# Patient Record
Sex: MALE | Race: WHITE | NOT HISPANIC OR LATINO
[De-identification: names, ages, dates, MRNs, and addresses within clinical notes are randomized per-mention and may not be internally consistent; named-entity substitution may affect disease eponyms.]

---

## 2017-03-23 ENCOUNTER — APPOINTMENT (OUTPATIENT)
Dept: HEMATOLOGY ONCOLOGY | Facility: CLINIC | Age: 67
End: 2017-03-23

## 2017-03-23 ENCOUNTER — OUTPATIENT (OUTPATIENT)
Dept: OUTPATIENT SERVICES | Facility: HOSPITAL | Age: 67
LOS: 1 days | Discharge: HOME | End: 2017-03-23

## 2017-03-23 VITALS
DIASTOLIC BLOOD PRESSURE: 91 MMHG | SYSTOLIC BLOOD PRESSURE: 185 MMHG | TEMPERATURE: 96.2 F | RESPIRATION RATE: 12 BRPM | HEART RATE: 75 BPM | WEIGHT: 242 LBS

## 2017-03-23 DIAGNOSIS — I48.91 UNSPECIFIED ATRIAL FIBRILLATION: ICD-10-CM

## 2017-03-23 DIAGNOSIS — N18.4 CHRONIC KIDNEY DISEASE, STAGE 4 (SEVERE): ICD-10-CM

## 2017-03-23 DIAGNOSIS — I10 ESSENTIAL (PRIMARY) HYPERTENSION: ICD-10-CM

## 2017-03-23 DIAGNOSIS — L27.0 GENERALIZED SKIN ERUPTION DUE TO DRUGS AND MEDICAMENTS TAKEN INTERNALLY: ICD-10-CM

## 2017-03-23 DIAGNOSIS — D64.9 ANEMIA, UNSPECIFIED: ICD-10-CM

## 2017-03-23 LAB
BASOPHILS # BLD: 0.05 TH/MM3
BASOPHILS NFR BLD: 0.7 %
EOSINOPHIL # BLD: 0.5 TH/MM3
EOSINOPHIL NFR BLD: 7.2 %
ERYTHROCYTE [DISTWIDTH] IN BLOOD BY AUTOMATED COUNT: 12.6 %
GRANULOCYTES # BLD: 3.92 TH/MM3
GRANULOCYTES NFR BLD: 56.5 %
HCT VFR BLD AUTO: 26.7 %
HGB BLD-MCNC: 9.2 G/DL
IMM GRANULOCYTES # BLD: 0.04 TH/MM3
IMM GRANULOCYTES NFR BLD: 0.6 %
LYMPHOCYTES # BLD: 1.87 TH/MM3
LYMPHOCYTES NFR BLD: 26.9 %
MCH RBC QN AUTO: 30.5 PG
MCHC RBC AUTO-ENTMCNC: 34.5 G/DL
MCV RBC AUTO: 88.4 FL
MONOCYTES # BLD: 0.56 TH/MM3
MONOCYTES NFR BLD: 8.1 %
PLATELET # BLD: 151 TH/MM3
PMV BLD AUTO: 10.1 FL
RBC # BLD AUTO: 3.02 MIL/MM3
WBC # BLD: 6.94 TH/MM3

## 2017-03-23 RX ORDER — INSULIN GLARGINE 100 [IU]/ML
100 INJECTION, SOLUTION SUBCUTANEOUS
Qty: 30 | Refills: 0 | Status: COMPLETED | COMMUNITY
Start: 2017-02-15

## 2017-05-31 ENCOUNTER — OUTPATIENT (OUTPATIENT)
Dept: OUTPATIENT SERVICES | Facility: HOSPITAL | Age: 67
LOS: 1 days | Discharge: HOME | End: 2017-05-31

## 2017-06-22 ENCOUNTER — OUTPATIENT (OUTPATIENT)
Dept: OUTPATIENT SERVICES | Facility: HOSPITAL | Age: 67
LOS: 1 days | Discharge: HOME | End: 2017-06-22

## 2017-06-22 DIAGNOSIS — L27.0 GENERALIZED SKIN ERUPTION DUE TO DRUGS AND MEDICAMENTS TAKEN INTERNALLY: ICD-10-CM

## 2017-06-22 DIAGNOSIS — N18.4 CHRONIC KIDNEY DISEASE, STAGE 4 (SEVERE): ICD-10-CM

## 2017-06-22 DIAGNOSIS — I48.91 UNSPECIFIED ATRIAL FIBRILLATION: ICD-10-CM

## 2017-06-22 DIAGNOSIS — I10 ESSENTIAL (PRIMARY) HYPERTENSION: ICD-10-CM

## 2017-06-26 ENCOUNTER — TRANSCRIPTION ENCOUNTER (OUTPATIENT)
Age: 67
End: 2017-06-26

## 2017-06-28 DIAGNOSIS — N18.3 CHRONIC KIDNEY DISEASE, STAGE 3 (MODERATE): ICD-10-CM

## 2017-06-28 DIAGNOSIS — H25.11 AGE-RELATED NUCLEAR CATARACT, RIGHT EYE: ICD-10-CM

## 2017-06-28 DIAGNOSIS — G47.33 OBSTRUCTIVE SLEEP APNEA (ADULT) (PEDIATRIC): ICD-10-CM

## 2017-06-28 DIAGNOSIS — H57.04 MYDRIASIS: ICD-10-CM

## 2017-06-28 DIAGNOSIS — I10 ESSENTIAL (PRIMARY) HYPERTENSION: ICD-10-CM

## 2017-06-28 DIAGNOSIS — E11.9 TYPE 2 DIABETES MELLITUS WITHOUT COMPLICATIONS: ICD-10-CM

## 2017-09-21 ENCOUNTER — APPOINTMENT (OUTPATIENT)
Dept: HEMATOLOGY ONCOLOGY | Facility: CLINIC | Age: 67
End: 2017-09-21

## 2017-09-21 VITALS
HEART RATE: 77 BPM | SYSTOLIC BLOOD PRESSURE: 179 MMHG | DIASTOLIC BLOOD PRESSURE: 74 MMHG | TEMPERATURE: 98.6 F | HEIGHT: 69 IN | BODY MASS INDEX: 35.84 KG/M2 | RESPIRATION RATE: 14 BRPM | WEIGHT: 242 LBS

## 2017-09-27 LAB
BASOPHILS # BLD: 0.09 TH/MM3
BASOPHILS NFR BLD: 1.3 %
EOSINOPHIL # BLD: 0.63 TH/MM3
EOSINOPHIL NFR BLD: 9.3 %
ERYTHROCYTE [DISTWIDTH] IN BLOOD BY AUTOMATED COUNT: 14.8 %
GRANULOCYTES # BLD: 4.11 TH/MM3
GRANULOCYTES NFR BLD: 60.4 %
HCT VFR BLD AUTO: 22 %
HGB BLD-MCNC: 6.9 G/DL
IMM GRANULOCYTES # BLD: 0.03 TH/MM3
IMM GRANULOCYTES NFR BLD: 0.4 %
LYMPHOCYTES # BLD: 1.5 TH/MM3
LYMPHOCYTES NFR BLD: 22 %
MCH RBC QN AUTO: 28.4 PG
MCHC RBC AUTO-ENTMCNC: 31.4 G/DL
MCV RBC AUTO: 90.5 FL
MONOCYTES # BLD: 0.45 TH/MM3
MONOCYTES NFR BLD: 6.6 %
PLATELET # BLD: 166 TH/MM3
PMV BLD AUTO: 9.5 FL
RBC # BLD AUTO: 2.43 MIL/MM3
WBC # BLD: 6.81 TH/MM3

## 2017-09-28 ENCOUNTER — APPOINTMENT (OUTPATIENT)
Dept: HEMATOLOGY ONCOLOGY | Facility: CLINIC | Age: 67
End: 2017-09-28

## 2017-09-28 LAB
BASOPHILS # BLD: 0.07 TH/MM3
BASOPHILS NFR BLD: 1 %
EOSINOPHIL # BLD: 0.53 TH/MM3
EOSINOPHIL NFR BLD: 7.9 %
ERYTHROCYTE [DISTWIDTH] IN BLOOD BY AUTOMATED COUNT: 14.4 %
GRANULOCYTES # BLD: 3.99 TH/MM3
GRANULOCYTES NFR BLD: 59.8 %
HCT VFR BLD AUTO: 22.3 %
HGB BLD-MCNC: 7.3 G/DL
IMM GRANULOCYTES # BLD: 0.05 TH/MM3
IMM GRANULOCYTES NFR BLD: 0.7 %
LYMPHOCYTES # BLD: 1.6 TH/MM3
LYMPHOCYTES NFR BLD: 23.9 %
MCH RBC QN AUTO: 29.1 PG
MCHC RBC AUTO-ENTMCNC: 32.7 G/DL
MCV RBC AUTO: 88.8 FL
MONOCYTES # BLD: 0.45 TH/MM3
MONOCYTES NFR BLD: 6.7 %
PLATELET # BLD: 173 TH/MM3
PMV BLD AUTO: 9.5 FL
RBC # BLD AUTO: 2.51 MIL/MM3
WBC # BLD: 6.69 TH/MM3

## 2017-10-05 ENCOUNTER — APPOINTMENT (OUTPATIENT)
Dept: HEMATOLOGY ONCOLOGY | Facility: CLINIC | Age: 67
End: 2017-10-05

## 2017-11-16 ENCOUNTER — OUTPATIENT (OUTPATIENT)
Dept: OUTPATIENT SERVICES | Facility: HOSPITAL | Age: 67
LOS: 1 days | Discharge: HOME | End: 2017-11-16

## 2017-11-16 ENCOUNTER — APPOINTMENT (OUTPATIENT)
Dept: HEMATOLOGY ONCOLOGY | Facility: CLINIC | Age: 67
End: 2017-11-16

## 2017-11-16 DIAGNOSIS — N18.2 CHRONIC KIDNEY DISEASE, STAGE 2 (MILD): ICD-10-CM

## 2017-11-16 DIAGNOSIS — D64.9 ANEMIA, UNSPECIFIED: ICD-10-CM

## 2017-11-16 LAB
BASOPHILS # BLD: 0.07 TH/MM3
BASOPHILS NFR BLD: 1 %
EOSINOPHIL # BLD: 0.71 TH/MM3
EOSINOPHIL NFR BLD: 10.2 %
ERYTHROCYTE [DISTWIDTH] IN BLOOD BY AUTOMATED COUNT: 14.6 %
GRANULOCYTES # BLD: 4.36 TH/MM3
GRANULOCYTES NFR BLD: 62.8 %
HCT VFR BLD AUTO: 21.8 %
HGB BLD-MCNC: 7.1 G/DL
IMM GRANULOCYTES # BLD: 0.03 TH/MM3
IMM GRANULOCYTES NFR BLD: 0.4 %
LYMPHOCYTES # BLD: 1.34 TH/MM3
LYMPHOCYTES NFR BLD: 19.3 %
MCH RBC QN AUTO: 28.9 PG
MCHC RBC AUTO-ENTMCNC: 32.6 G/DL
MCV RBC AUTO: 88.6 FL
MONOCYTES # BLD: 0.44 TH/MM3
MONOCYTES NFR BLD: 6.3 %
PLATELET # BLD: 173 TH/MM3
PMV BLD AUTO: 9.8 FL
RBC # BLD AUTO: 2.46 MIL/MM3
WBC # BLD: 6.95 TH/MM3

## 2017-12-22 ENCOUNTER — APPOINTMENT (OUTPATIENT)
Dept: HEMATOLOGY ONCOLOGY | Facility: CLINIC | Age: 67
End: 2017-12-22

## 2017-12-22 VITALS
BODY MASS INDEX: 36.58 KG/M2 | WEIGHT: 247 LBS | RESPIRATION RATE: 14 BRPM | HEART RATE: 83 BPM | DIASTOLIC BLOOD PRESSURE: 73 MMHG | HEIGHT: 69 IN | TEMPERATURE: 98.2 F | SYSTOLIC BLOOD PRESSURE: 166 MMHG

## 2017-12-22 LAB
BASOPHILS # BLD: 0.05 TH/MM3
BASOPHILS NFR BLD: 0.6 %
EOSINOPHIL # BLD: 0.62 TH/MM3
EOSINOPHIL NFR BLD: 7.9 %
ERYTHROCYTE [DISTWIDTH] IN BLOOD BY AUTOMATED COUNT: 14.8 %
GRANULOCYTES # BLD: 5.18 TH/MM3
GRANULOCYTES NFR BLD: 66 %
HCT VFR BLD AUTO: 22.9 %
HGB BLD-MCNC: 7.1 G/DL
IMM GRANULOCYTES # BLD: 0.03 TH/MM3
IMM GRANULOCYTES NFR BLD: 0.4 %
LYMPHOCYTES # BLD: 1.37 TH/MM3
LYMPHOCYTES NFR BLD: 17.5 %
MCH RBC QN AUTO: 27.8 PG
MCHC RBC AUTO-ENTMCNC: 31 G/DL
MCV RBC AUTO: 89.8 FL
MONOCYTES # BLD: 0.6 TH/MM3
MONOCYTES NFR BLD: 7.6 %
PLATELET # BLD: 196 TH/MM3
PMV BLD AUTO: 9.6 FL
RBC # BLD AUTO: 2.55 MIL/MM3
WBC # BLD: 7.85 TH/MM3

## 2018-01-25 ENCOUNTER — APPOINTMENT (OUTPATIENT)
Dept: HEMATOLOGY ONCOLOGY | Facility: CLINIC | Age: 68
End: 2018-01-25

## 2018-01-25 ENCOUNTER — APPOINTMENT (OUTPATIENT)
Dept: INFUSION THERAPY | Facility: CLINIC | Age: 68
End: 2018-01-25

## 2018-01-25 LAB
BASOPHILS # BLD: 0.04 TH/MM3
BASOPHILS NFR BLD: 0.5 %
EOSINOPHIL # BLD: 0.68 TH/MM3
EOSINOPHIL NFR BLD: 8.7 %
ERYTHROCYTE [DISTWIDTH] IN BLOOD BY AUTOMATED COUNT: 14.9 %
GRANULOCYTES # BLD: 5.23 TH/MM3
GRANULOCYTES NFR BLD: 67.3 %
HCT VFR BLD AUTO: 22.6 %
HGB BLD-MCNC: 7 G/DL
IMM GRANULOCYTES # BLD: 0.04 TH/MM3
IMM GRANULOCYTES NFR BLD: 0.5 %
LYMPHOCYTES # BLD: 1.34 TH/MM3
LYMPHOCYTES NFR BLD: 17.2 %
MCH RBC QN AUTO: 28 PG
MCHC RBC AUTO-ENTMCNC: 31 G/DL
MCV RBC AUTO: 90.4 FL
MONOCYTES # BLD: 0.45 TH/MM3
MONOCYTES NFR BLD: 5.8 %
PLATELET # BLD: 188 TH/MM3
PMV BLD AUTO: 9.6 FL
RBC # BLD AUTO: 2.5 MIL/MM3
WBC # BLD: 7.78 TH/MM3

## 2018-01-26 ENCOUNTER — APPOINTMENT (OUTPATIENT)
Dept: INFUSION THERAPY | Facility: CLINIC | Age: 68
End: 2018-01-26

## 2018-01-30 DIAGNOSIS — N18.4 CHRONIC KIDNEY DISEASE, STAGE 4 (SEVERE): ICD-10-CM

## 2018-01-30 DIAGNOSIS — I10 ESSENTIAL (PRIMARY) HYPERTENSION: ICD-10-CM

## 2018-01-30 DIAGNOSIS — L27.0 GENERALIZED SKIN ERUPTION DUE TO DRUGS AND MEDICAMENTS TAKEN INTERNALLY: ICD-10-CM

## 2018-01-30 DIAGNOSIS — I48.91 UNSPECIFIED ATRIAL FIBRILLATION: ICD-10-CM

## 2018-02-01 ENCOUNTER — APPOINTMENT (OUTPATIENT)
Dept: INFUSION THERAPY | Facility: CLINIC | Age: 68
End: 2018-02-01

## 2018-02-08 ENCOUNTER — APPOINTMENT (OUTPATIENT)
Dept: INFUSION THERAPY | Facility: CLINIC | Age: 68
End: 2018-02-08

## 2018-02-08 ENCOUNTER — LABORATORY RESULT (OUTPATIENT)
Age: 68
End: 2018-02-08

## 2018-02-08 LAB
HCT VFR BLD CALC: 22 %
HGB BLD-MCNC: 6.9 G/DL
MCHC RBC-ENTMCNC: 27.4 PG
MCHC RBC-ENTMCNC: 31.4 G/DL
MCV RBC AUTO: 87.3 FL
PLATELET # BLD AUTO: 189 K/UL
PMV BLD: 9.7 FL
RBC # BLD: 2.52 M/UL
RBC # FLD: 15.6 %
WBC # FLD AUTO: 6.88 K/UL

## 2018-02-08 RX ORDER — ERYTHROPOIETIN 10000 [IU]/ML
20000 INJECTION, SOLUTION INTRAVENOUS; SUBCUTANEOUS ONCE
Qty: 0 | Refills: 0 | Status: COMPLETED | OUTPATIENT
Start: 2018-02-08 | End: 2018-02-08

## 2018-02-08 RX ADMIN — ERYTHROPOIETIN 20000 UNIT(S): 10000 INJECTION, SOLUTION INTRAVENOUS; SUBCUTANEOUS at 10:17

## 2018-02-16 ENCOUNTER — APPOINTMENT (OUTPATIENT)
Dept: INFUSION THERAPY | Facility: CLINIC | Age: 68
End: 2018-02-16

## 2018-02-16 RX ORDER — ERYTHROPOIETIN 10000 [IU]/ML
20000 INJECTION, SOLUTION INTRAVENOUS; SUBCUTANEOUS ONCE
Qty: 0 | Refills: 0 | Status: COMPLETED | OUTPATIENT
Start: 2018-02-16 | End: 2018-02-16

## 2018-02-16 RX ADMIN — ERYTHROPOIETIN 20000 UNIT(S): 10000 INJECTION, SOLUTION INTRAVENOUS; SUBCUTANEOUS at 10:48

## 2018-02-23 ENCOUNTER — LABORATORY RESULT (OUTPATIENT)
Age: 68
End: 2018-02-23

## 2018-02-23 ENCOUNTER — APPOINTMENT (OUTPATIENT)
Dept: INFUSION THERAPY | Facility: CLINIC | Age: 68
End: 2018-02-23

## 2018-02-23 LAB
HCT VFR BLD CALC: 24.5 %
HGB BLD-MCNC: 7.8 G/DL
MCHC RBC-ENTMCNC: 27.1 PG
MCHC RBC-ENTMCNC: 31.8 G/DL
MCV RBC AUTO: 85.1 FL
PLATELET # BLD AUTO: 206 K/UL
PMV BLD: 9.7 FL
RBC # BLD: 2.88 M/UL
RBC # FLD: 14.7 %
WBC # FLD AUTO: 9.97 K/UL

## 2018-02-23 RX ORDER — ERYTHROPOIETIN 10000 [IU]/ML
20000 INJECTION, SOLUTION INTRAVENOUS; SUBCUTANEOUS ONCE
Qty: 0 | Refills: 0 | Status: COMPLETED | OUTPATIENT
Start: 2018-02-23 | End: 2018-02-23

## 2018-02-23 RX ADMIN — ERYTHROPOIETIN 20000 UNIT(S): 10000 INJECTION, SOLUTION INTRAVENOUS; SUBCUTANEOUS at 10:38

## 2018-03-02 ENCOUNTER — APPOINTMENT (OUTPATIENT)
Dept: INFUSION THERAPY | Facility: CLINIC | Age: 68
End: 2018-03-02

## 2018-03-02 ENCOUNTER — LABORATORY RESULT (OUTPATIENT)
Age: 68
End: 2018-03-02

## 2018-03-02 LAB
HCT VFR BLD CALC: 24.3 %
HGB BLD-MCNC: 7.7 G/DL
MCHC RBC-ENTMCNC: 26.5 PG
MCHC RBC-ENTMCNC: 31.7 G/DL
MCV RBC AUTO: 83.5 FL
PLATELET # BLD AUTO: 262 K/UL
PMV BLD: 9.8 FL
RBC # BLD: 2.91 M/UL
RBC # FLD: 15.3 %
WBC # FLD AUTO: 8.31 K/UL

## 2018-03-02 RX ORDER — ERYTHROPOIETIN 10000 [IU]/ML
20000 INJECTION, SOLUTION INTRAVENOUS; SUBCUTANEOUS ONCE
Qty: 0 | Refills: 0 | Status: COMPLETED | OUTPATIENT
Start: 2018-03-02 | End: 2018-03-02

## 2018-03-02 RX ADMIN — ERYTHROPOIETIN 20000 UNIT(S): 10000 INJECTION, SOLUTION INTRAVENOUS; SUBCUTANEOUS at 10:20

## 2018-03-09 ENCOUNTER — APPOINTMENT (OUTPATIENT)
Dept: INFUSION THERAPY | Facility: CLINIC | Age: 68
End: 2018-03-09

## 2018-03-09 ENCOUNTER — LABORATORY RESULT (OUTPATIENT)
Age: 68
End: 2018-03-09

## 2018-03-09 LAB
HCT VFR BLD CALC: 26.1 %
HGB BLD-MCNC: 8.2 G/DL
MCHC RBC-ENTMCNC: 26.4 PG
MCHC RBC-ENTMCNC: 31.4 G/DL
MCV RBC AUTO: 83.9 FL
PLATELET # BLD AUTO: 266 K/UL
PMV BLD: 9.6 FL
RBC # BLD: 3.11 M/UL
RBC # FLD: 15.6 %
WBC # FLD AUTO: 7.74 K/UL

## 2018-03-09 RX ORDER — ERYTHROPOIETIN 10000 [IU]/ML
20000 INJECTION, SOLUTION INTRAVENOUS; SUBCUTANEOUS ONCE
Qty: 0 | Refills: 0 | Status: COMPLETED | OUTPATIENT
Start: 2018-03-09 | End: 2018-03-09

## 2018-03-09 RX ADMIN — ERYTHROPOIETIN 20000 UNIT(S): 10000 INJECTION, SOLUTION INTRAVENOUS; SUBCUTANEOUS at 10:44

## 2018-03-16 ENCOUNTER — APPOINTMENT (OUTPATIENT)
Dept: INFUSION THERAPY | Facility: CLINIC | Age: 68
End: 2018-03-16

## 2018-03-16 ENCOUNTER — LABORATORY RESULT (OUTPATIENT)
Age: 68
End: 2018-03-16

## 2018-03-16 LAB
HCT VFR BLD CALC: 26.3 %
HGB BLD-MCNC: 8.5 G/DL
MCHC RBC-ENTMCNC: 26.5 PG
MCHC RBC-ENTMCNC: 32.3 G/DL
MCV RBC AUTO: 81.9 FL
PLATELET # BLD AUTO: 209 K/UL
PMV BLD: 9.6 FL
RBC # BLD: 3.21 M/UL
RBC # FLD: 15.6 %
WBC # FLD AUTO: 9.69 K/UL

## 2018-03-16 RX ORDER — ERYTHROPOIETIN 10000 [IU]/ML
20000 INJECTION, SOLUTION INTRAVENOUS; SUBCUTANEOUS ONCE
Qty: 0 | Refills: 0 | Status: COMPLETED | OUTPATIENT
Start: 2018-03-16 | End: 2018-03-16

## 2018-03-16 RX ADMIN — ERYTHROPOIETIN 20000 UNIT(S): 10000 INJECTION, SOLUTION INTRAVENOUS; SUBCUTANEOUS at 10:58

## 2018-03-23 ENCOUNTER — APPOINTMENT (OUTPATIENT)
Dept: INFUSION THERAPY | Facility: CLINIC | Age: 68
End: 2018-03-23

## 2018-03-23 ENCOUNTER — LABORATORY RESULT (OUTPATIENT)
Age: 68
End: 2018-03-23

## 2018-03-23 LAB
HCT VFR BLD CALC: 27.2 %
HGB BLD-MCNC: 8.6 G/DL
MCHC RBC-ENTMCNC: 26.2 PG
MCHC RBC-ENTMCNC: 31.6 G/DL
MCV RBC AUTO: 82.9 FL
PLATELET # BLD AUTO: 206 K/UL
PMV BLD: 9.9 FL
RBC # BLD: 3.28 M/UL
RBC # FLD: 15.5 %
WBC # FLD AUTO: 6.82 K/UL

## 2018-03-23 RX ORDER — ERYTHROPOIETIN 10000 [IU]/ML
20000 INJECTION, SOLUTION INTRAVENOUS; SUBCUTANEOUS ONCE
Qty: 0 | Refills: 0 | Status: COMPLETED | OUTPATIENT
Start: 2018-03-23 | End: 2018-03-23

## 2018-03-23 RX ADMIN — ERYTHROPOIETIN 20000 UNIT(S): 10000 INJECTION, SOLUTION INTRAVENOUS; SUBCUTANEOUS at 10:22

## 2018-03-30 ENCOUNTER — APPOINTMENT (OUTPATIENT)
Dept: INFUSION THERAPY | Facility: CLINIC | Age: 68
End: 2018-03-30

## 2018-03-30 ENCOUNTER — LABORATORY RESULT (OUTPATIENT)
Age: 68
End: 2018-03-30

## 2018-03-30 LAB
HCT VFR BLD CALC: 28.7 %
HGB BLD-MCNC: 9 G/DL
MCHC RBC-ENTMCNC: 26.2 PG
MCHC RBC-ENTMCNC: 31.4 G/DL
MCV RBC AUTO: 83.7 FL
PLATELET # BLD AUTO: 191 K/UL
PMV BLD: 10.4 FL
RBC # BLD: 3.43 M/UL
RBC # FLD: 16.4 %
WBC # FLD AUTO: 8.08 K/UL

## 2018-03-30 RX ORDER — ERYTHROPOIETIN 10000 [IU]/ML
20000 INJECTION, SOLUTION INTRAVENOUS; SUBCUTANEOUS ONCE
Qty: 0 | Refills: 0 | Status: COMPLETED | OUTPATIENT
Start: 2018-03-30 | End: 2018-03-30

## 2018-03-30 RX ADMIN — ERYTHROPOIETIN 20000 UNIT(S): 10000 INJECTION, SOLUTION INTRAVENOUS; SUBCUTANEOUS at 10:46

## 2018-04-06 ENCOUNTER — LABORATORY RESULT (OUTPATIENT)
Age: 68
End: 2018-04-06

## 2018-04-06 ENCOUNTER — APPOINTMENT (OUTPATIENT)
Dept: INFUSION THERAPY | Facility: CLINIC | Age: 68
End: 2018-04-06

## 2018-04-06 LAB
HCT VFR BLD CALC: 27.9 %
HGB BLD-MCNC: 8.8 G/DL
MCHC RBC-ENTMCNC: 26.6 PG
MCHC RBC-ENTMCNC: 31.5 G/DL
MCV RBC AUTO: 84.3 FL
PLATELET # BLD AUTO: 185 K/UL
PMV BLD: 9.8 FL
RBC # BLD: 3.31 M/UL
RBC # FLD: 17 %
WBC # FLD AUTO: 8.81 K/UL

## 2018-04-06 RX ORDER — ERYTHROPOIETIN 10000 [IU]/ML
20000 INJECTION, SOLUTION INTRAVENOUS; SUBCUTANEOUS ONCE
Qty: 0 | Refills: 0 | Status: COMPLETED | OUTPATIENT
Start: 2018-04-06 | End: 2018-04-06

## 2018-04-06 RX ADMIN — ERYTHROPOIETIN 20000 UNIT(S): 10000 INJECTION, SOLUTION INTRAVENOUS; SUBCUTANEOUS at 11:34

## 2018-04-13 ENCOUNTER — LABORATORY RESULT (OUTPATIENT)
Age: 68
End: 2018-04-13

## 2018-04-13 ENCOUNTER — APPOINTMENT (OUTPATIENT)
Dept: INFUSION THERAPY | Facility: CLINIC | Age: 68
End: 2018-04-13

## 2018-04-13 LAB
HCT VFR BLD CALC: 28.9 %
HGB BLD-MCNC: 9 G/DL
MCHC RBC-ENTMCNC: 26.3 PG
MCHC RBC-ENTMCNC: 31.1 G/DL
MCV RBC AUTO: 84.5 FL
PLATELET # BLD AUTO: 211 K/UL
PMV BLD: 10.3 FL
RBC # BLD: 3.42 M/UL
RBC # FLD: 17 %
WBC # FLD AUTO: 12.54 K/UL

## 2018-04-13 RX ORDER — ERYTHROPOIETIN 10000 [IU]/ML
20000 INJECTION, SOLUTION INTRAVENOUS; SUBCUTANEOUS ONCE
Qty: 0 | Refills: 0 | Status: COMPLETED | OUTPATIENT
Start: 2018-04-13 | End: 2018-04-13

## 2018-04-13 RX ADMIN — ERYTHROPOIETIN 20000 UNIT(S): 10000 INJECTION, SOLUTION INTRAVENOUS; SUBCUTANEOUS at 10:21

## 2018-04-20 ENCOUNTER — APPOINTMENT (OUTPATIENT)
Dept: INFUSION THERAPY | Facility: CLINIC | Age: 68
End: 2018-04-20

## 2018-04-20 ENCOUNTER — LABORATORY RESULT (OUTPATIENT)
Age: 68
End: 2018-04-20

## 2018-04-20 LAB
HCT VFR BLD CALC: 28.1 %
HGB BLD-MCNC: 8.9 G/DL
MCHC RBC-ENTMCNC: 26.3 PG
MCHC RBC-ENTMCNC: 31.7 G/DL
MCV RBC AUTO: 83.1 FL
PLATELET # BLD AUTO: 191 K/UL
PMV BLD: 10.1 FL
RBC # BLD: 3.38 M/UL
RBC # FLD: 16.6 %
WBC # FLD AUTO: 9.28 K/UL

## 2018-04-20 RX ORDER — ERYTHROPOIETIN 10000 [IU]/ML
20000 INJECTION, SOLUTION INTRAVENOUS; SUBCUTANEOUS ONCE
Qty: 0 | Refills: 0 | Status: COMPLETED | OUTPATIENT
Start: 2018-04-20 | End: 2018-04-20

## 2018-04-20 RX ADMIN — ERYTHROPOIETIN 20000 UNIT(S): 10000 INJECTION, SOLUTION INTRAVENOUS; SUBCUTANEOUS at 10:48

## 2018-04-27 ENCOUNTER — APPOINTMENT (OUTPATIENT)
Dept: INFUSION THERAPY | Facility: CLINIC | Age: 68
End: 2018-04-27

## 2018-04-27 ENCOUNTER — LABORATORY RESULT (OUTPATIENT)
Age: 68
End: 2018-04-27

## 2018-04-27 LAB
HCT VFR BLD CALC: 29.4 %
HGB BLD-MCNC: 9 G/DL
MCHC RBC-ENTMCNC: 26 PG
MCHC RBC-ENTMCNC: 30.6 G/DL
MCV RBC AUTO: 85 FL
PLATELET # BLD AUTO: 224 K/UL
PMV BLD: 9.7 FL
RBC # BLD: 3.46 M/UL
RBC # FLD: 16.6 %
WBC # FLD AUTO: 7.04 K/UL

## 2018-04-27 RX ORDER — ERYTHROPOIETIN 10000 [IU]/ML
20000 INJECTION, SOLUTION INTRAVENOUS; SUBCUTANEOUS ONCE
Qty: 0 | Refills: 0 | Status: COMPLETED | OUTPATIENT
Start: 2018-04-27 | End: 2018-04-27

## 2018-04-27 RX ADMIN — ERYTHROPOIETIN 20000 UNIT(S): 10000 INJECTION, SOLUTION INTRAVENOUS; SUBCUTANEOUS at 10:51

## 2018-05-04 ENCOUNTER — LABORATORY RESULT (OUTPATIENT)
Age: 68
End: 2018-05-04

## 2018-05-04 ENCOUNTER — APPOINTMENT (OUTPATIENT)
Dept: INFUSION THERAPY | Facility: CLINIC | Age: 68
End: 2018-05-04

## 2018-05-04 RX ORDER — ERYTHROPOIETIN 10000 [IU]/ML
20000 INJECTION, SOLUTION INTRAVENOUS; SUBCUTANEOUS ONCE
Qty: 0 | Refills: 0 | Status: COMPLETED | OUTPATIENT
Start: 2018-05-04 | End: 2018-05-04

## 2018-05-04 RX ADMIN — ERYTHROPOIETIN 20000 UNIT(S): 10000 INJECTION, SOLUTION INTRAVENOUS; SUBCUTANEOUS at 10:59

## 2018-05-07 LAB
HCT VFR BLD CALC: 30.8 %
HGB BLD-MCNC: 9.7 G/DL
MCHC RBC-ENTMCNC: 26.5 PG
MCHC RBC-ENTMCNC: 31.5 G/DL
MCV RBC AUTO: 84.2 FL
PLATELET # BLD AUTO: 208 K/UL
PMV BLD: 9.7 FL
RBC # BLD: 3.66 M/UL
RBC # FLD: 17.2 %
WBC # FLD AUTO: 6.54 K/UL

## 2018-05-11 ENCOUNTER — APPOINTMENT (OUTPATIENT)
Dept: INFUSION THERAPY | Facility: CLINIC | Age: 68
End: 2018-05-11

## 2018-05-11 RX ORDER — ERYTHROPOIETIN 10000 [IU]/ML
20000 INJECTION, SOLUTION INTRAVENOUS; SUBCUTANEOUS ONCE
Qty: 0 | Refills: 0 | Status: COMPLETED | OUTPATIENT
Start: 2018-05-11 | End: 2018-05-11

## 2018-05-11 RX ADMIN — ERYTHROPOIETIN 20000 UNIT(S): 10000 INJECTION, SOLUTION INTRAVENOUS; SUBCUTANEOUS at 10:14

## 2018-05-18 ENCOUNTER — LABORATORY RESULT (OUTPATIENT)
Age: 68
End: 2018-05-18

## 2018-05-18 ENCOUNTER — APPOINTMENT (OUTPATIENT)
Dept: INFUSION THERAPY | Facility: CLINIC | Age: 68
End: 2018-05-18

## 2018-05-18 LAB
HCT VFR BLD CALC: 31.6 %
HGB BLD-MCNC: 9.8 G/DL
MCHC RBC-ENTMCNC: 26.3 PG
MCHC RBC-ENTMCNC: 31 G/DL
MCV RBC AUTO: 84.7 FL
PLATELET # BLD AUTO: 169 K/UL
PMV BLD: 9.5 FL
RBC # BLD: 3.73 M/UL
RBC # FLD: 17.4 %
WBC # FLD AUTO: 6.8 K/UL

## 2018-05-18 RX ORDER — ERYTHROPOIETIN 10000 [IU]/ML
20000 INJECTION, SOLUTION INTRAVENOUS; SUBCUTANEOUS ONCE
Qty: 0 | Refills: 0 | Status: COMPLETED | OUTPATIENT
Start: 2018-05-18 | End: 2018-05-18

## 2018-05-18 RX ADMIN — ERYTHROPOIETIN 20000 UNIT(S): 10000 INJECTION, SOLUTION INTRAVENOUS; SUBCUTANEOUS at 10:31

## 2018-05-25 ENCOUNTER — APPOINTMENT (OUTPATIENT)
Dept: INFUSION THERAPY | Facility: CLINIC | Age: 68
End: 2018-05-25

## 2018-05-25 RX ORDER — ERYTHROPOIETIN 10000 [IU]/ML
20000 INJECTION, SOLUTION INTRAVENOUS; SUBCUTANEOUS ONCE
Qty: 0 | Refills: 0 | Status: COMPLETED | OUTPATIENT
Start: 2018-05-25 | End: 2018-05-25

## 2018-05-25 RX ADMIN — ERYTHROPOIETIN 20000 UNIT(S): 10000 INJECTION, SOLUTION INTRAVENOUS; SUBCUTANEOUS at 09:29

## 2018-06-01 ENCOUNTER — APPOINTMENT (OUTPATIENT)
Dept: INFUSION THERAPY | Facility: CLINIC | Age: 68
End: 2018-06-01

## 2018-06-01 ENCOUNTER — LABORATORY RESULT (OUTPATIENT)
Age: 68
End: 2018-06-01

## 2018-06-01 VITALS
SYSTOLIC BLOOD PRESSURE: 156 MMHG | DIASTOLIC BLOOD PRESSURE: 69 MMHG | HEART RATE: 74 BPM | TEMPERATURE: 97.3 F | RESPIRATION RATE: 18 BRPM

## 2018-06-01 LAB
HCT VFR BLD CALC: 30.5 %
HGB BLD-MCNC: 9.5 G/DL
MCHC RBC-ENTMCNC: 26.5 PG
MCHC RBC-ENTMCNC: 31.1 G/DL
MCV RBC AUTO: 85 FL
PLATELET # BLD AUTO: 182 K/UL
PMV BLD: 9.5 FL
RBC # BLD: 3.59 M/UL
RBC # FLD: 17 %
WBC # FLD AUTO: 7.67 K/UL

## 2018-06-01 RX ORDER — ERYTHROPOIETIN 10000 [IU]/ML
20000 INJECTION, SOLUTION INTRAVENOUS; SUBCUTANEOUS ONCE
Qty: 0 | Refills: 0 | Status: COMPLETED | OUTPATIENT
Start: 2018-06-01 | End: 2018-06-01

## 2018-06-01 RX ADMIN — ERYTHROPOIETIN 20000 UNIT(S): 10000 INJECTION, SOLUTION INTRAVENOUS; SUBCUTANEOUS at 10:29

## 2018-06-08 ENCOUNTER — APPOINTMENT (OUTPATIENT)
Dept: INFUSION THERAPY | Facility: CLINIC | Age: 68
End: 2018-06-08

## 2018-06-08 RX ORDER — ERYTHROPOIETIN 10000 [IU]/ML
20000 INJECTION, SOLUTION INTRAVENOUS; SUBCUTANEOUS ONCE
Qty: 0 | Refills: 0 | Status: COMPLETED | OUTPATIENT
Start: 2018-06-08 | End: 2018-09-07

## 2018-06-15 ENCOUNTER — LABORATORY RESULT (OUTPATIENT)
Age: 68
End: 2018-06-15

## 2018-06-15 ENCOUNTER — APPOINTMENT (OUTPATIENT)
Dept: INFUSION THERAPY | Facility: CLINIC | Age: 68
End: 2018-06-15

## 2018-06-15 LAB
HCT VFR BLD CALC: 28.9 %
HGB BLD-MCNC: 9.1 G/DL
MCHC RBC-ENTMCNC: 26.2 PG
MCHC RBC-ENTMCNC: 31.5 G/DL
MCV RBC AUTO: 83.3 FL
PLATELET # BLD AUTO: 208 K/UL
PMV BLD: 9.9 FL
RBC # BLD: 3.47 M/UL
RBC # FLD: 15.6 %
WBC # FLD AUTO: 8.71 K/UL

## 2018-06-15 RX ORDER — ERYTHROPOIETIN 10000 [IU]/ML
20000 INJECTION, SOLUTION INTRAVENOUS; SUBCUTANEOUS ONCE
Qty: 0 | Refills: 0 | Status: COMPLETED | OUTPATIENT
Start: 2018-06-15 | End: 2018-06-15

## 2018-06-15 RX ADMIN — ERYTHROPOIETIN 20000 UNIT(S): 10000 INJECTION, SOLUTION INTRAVENOUS; SUBCUTANEOUS at 11:09

## 2018-06-22 ENCOUNTER — APPOINTMENT (OUTPATIENT)
Dept: INFUSION THERAPY | Facility: CLINIC | Age: 68
End: 2018-06-22

## 2018-06-22 ENCOUNTER — OUTPATIENT (OUTPATIENT)
Dept: OUTPATIENT SERVICES | Facility: HOSPITAL | Age: 68
LOS: 1 days | Discharge: HOME | End: 2018-06-22

## 2018-06-22 DIAGNOSIS — D64.9 ANEMIA, UNSPECIFIED: ICD-10-CM

## 2018-06-22 DIAGNOSIS — N28.9 DISORDER OF KIDNEY AND URETER, UNSPECIFIED: ICD-10-CM

## 2018-06-22 RX ORDER — ERYTHROPOIETIN 10000 [IU]/ML
20000 INJECTION, SOLUTION INTRAVENOUS; SUBCUTANEOUS ONCE
Qty: 0 | Refills: 0 | Status: COMPLETED | OUTPATIENT
Start: 2018-06-22 | End: 2018-06-22

## 2018-06-22 RX ADMIN — ERYTHROPOIETIN 20000 UNIT(S): 10000 INJECTION, SOLUTION INTRAVENOUS; SUBCUTANEOUS at 10:29

## 2018-06-29 ENCOUNTER — APPOINTMENT (OUTPATIENT)
Dept: INFUSION THERAPY | Facility: CLINIC | Age: 68
End: 2018-06-29

## 2018-06-29 ENCOUNTER — LABORATORY RESULT (OUTPATIENT)
Age: 68
End: 2018-06-29

## 2018-06-29 LAB
HCT VFR BLD CALC: 29 %
HGB BLD-MCNC: 9.2 G/DL
MCHC RBC-ENTMCNC: 26.2 PG
MCHC RBC-ENTMCNC: 31.7 G/DL
MCV RBC AUTO: 82.6 FL
PLATELET # BLD AUTO: 208 K/UL
PMV BLD: 9.3 FL
RBC # BLD: 3.51 M/UL
RBC # FLD: 15.4 %
WBC # FLD AUTO: 10.33 K/UL

## 2018-06-29 RX ORDER — ERYTHROPOIETIN 10000 [IU]/ML
20000 INJECTION, SOLUTION INTRAVENOUS; SUBCUTANEOUS ONCE
Qty: 0 | Refills: 0 | Status: COMPLETED | OUTPATIENT
Start: 2018-06-29 | End: 2018-06-29

## 2018-06-29 RX ADMIN — ERYTHROPOIETIN 20000 UNIT(S): 10000 INJECTION, SOLUTION INTRAVENOUS; SUBCUTANEOUS at 11:32

## 2018-07-06 ENCOUNTER — APPOINTMENT (OUTPATIENT)
Dept: INFUSION THERAPY | Facility: CLINIC | Age: 68
End: 2018-07-06

## 2018-07-06 RX ORDER — ERYTHROPOIETIN 10000 [IU]/ML
20000 INJECTION, SOLUTION INTRAVENOUS; SUBCUTANEOUS ONCE
Qty: 0 | Refills: 0 | Status: COMPLETED | OUTPATIENT
Start: 2018-07-06 | End: 2018-07-06

## 2018-07-06 RX ADMIN — ERYTHROPOIETIN 20000 UNIT(S): 10000 INJECTION, SOLUTION INTRAVENOUS; SUBCUTANEOUS at 10:26

## 2018-07-13 ENCOUNTER — APPOINTMENT (OUTPATIENT)
Dept: INFUSION THERAPY | Facility: CLINIC | Age: 68
End: 2018-07-13

## 2018-07-13 ENCOUNTER — LABORATORY RESULT (OUTPATIENT)
Age: 68
End: 2018-07-13

## 2018-07-13 LAB
HCT VFR BLD CALC: 30.5 %
HGB BLD-MCNC: 9.5 G/DL
MCHC RBC-ENTMCNC: 26.3 PG
MCHC RBC-ENTMCNC: 31.1 G/DL
MCV RBC AUTO: 84.5 FL
PLATELET # BLD AUTO: 201 K/UL
PMV BLD: 9.7 FL
RBC # BLD: 3.61 M/UL
RBC # FLD: 16.4 %
WBC # FLD AUTO: 7.98 K/UL

## 2018-07-13 RX ORDER — ERYTHROPOIETIN 10000 [IU]/ML
20000 INJECTION, SOLUTION INTRAVENOUS; SUBCUTANEOUS ONCE
Qty: 0 | Refills: 0 | Status: COMPLETED | OUTPATIENT
Start: 2018-07-13 | End: 2018-07-13

## 2018-07-13 RX ADMIN — ERYTHROPOIETIN 20000 UNIT(S): 10000 INJECTION, SOLUTION INTRAVENOUS; SUBCUTANEOUS at 10:34

## 2018-07-20 ENCOUNTER — APPOINTMENT (OUTPATIENT)
Dept: INFUSION THERAPY | Facility: CLINIC | Age: 68
End: 2018-07-20

## 2018-07-20 RX ORDER — ERYTHROPOIETIN 10000 [IU]/ML
20000 INJECTION, SOLUTION INTRAVENOUS; SUBCUTANEOUS ONCE
Qty: 0 | Refills: 0 | Status: COMPLETED | OUTPATIENT
Start: 2018-07-20 | End: 2018-07-20

## 2018-07-20 RX ADMIN — ERYTHROPOIETIN 20000 UNIT(S): 10000 INJECTION, SOLUTION INTRAVENOUS; SUBCUTANEOUS at 13:40

## 2018-07-27 ENCOUNTER — LABORATORY RESULT (OUTPATIENT)
Age: 68
End: 2018-07-27

## 2018-07-27 ENCOUNTER — APPOINTMENT (OUTPATIENT)
Dept: INFUSION THERAPY | Facility: CLINIC | Age: 68
End: 2018-07-27

## 2018-07-27 LAB
HCT VFR BLD CALC: 30.3 %
HGB BLD-MCNC: 9.7 G/DL
MCHC RBC-ENTMCNC: 27 PG
MCHC RBC-ENTMCNC: 32 G/DL
MCV RBC AUTO: 84.4 FL
PLATELET # BLD AUTO: 175 K/UL
PMV BLD: 9.4 FL
RBC # BLD: 3.59 M/UL
RBC # FLD: 16.5 %
WBC # FLD AUTO: 8.07 K/UL

## 2018-07-27 RX ORDER — ERYTHROPOIETIN 10000 [IU]/ML
20000 INJECTION, SOLUTION INTRAVENOUS; SUBCUTANEOUS ONCE
Qty: 0 | Refills: 0 | Status: COMPLETED | OUTPATIENT
Start: 2018-07-27 | End: 2018-07-27

## 2018-07-27 RX ADMIN — ERYTHROPOIETIN 20000 UNIT(S): 10000 INJECTION, SOLUTION INTRAVENOUS; SUBCUTANEOUS at 11:18

## 2018-08-03 ENCOUNTER — APPOINTMENT (OUTPATIENT)
Dept: INFUSION THERAPY | Facility: CLINIC | Age: 68
End: 2018-08-03

## 2018-08-03 RX ORDER — ERYTHROPOIETIN 10000 [IU]/ML
20000 INJECTION, SOLUTION INTRAVENOUS; SUBCUTANEOUS ONCE
Qty: 0 | Refills: 0 | Status: COMPLETED | OUTPATIENT
Start: 2018-08-03 | End: 2018-08-03

## 2018-08-03 RX ADMIN — ERYTHROPOIETIN 20000 UNIT(S): 10000 INJECTION, SOLUTION INTRAVENOUS; SUBCUTANEOUS at 10:40

## 2018-08-09 ENCOUNTER — APPOINTMENT (OUTPATIENT)
Dept: HEMATOLOGY ONCOLOGY | Facility: CLINIC | Age: 68
End: 2018-08-09

## 2018-08-09 ENCOUNTER — LABORATORY RESULT (OUTPATIENT)
Age: 68
End: 2018-08-09

## 2018-08-09 ENCOUNTER — APPOINTMENT (OUTPATIENT)
Dept: INFUSION THERAPY | Facility: CLINIC | Age: 68
End: 2018-08-09

## 2018-08-09 VITALS
HEIGHT: 69 IN | RESPIRATION RATE: 14 BRPM | WEIGHT: 247 LBS | TEMPERATURE: 97.6 F | BODY MASS INDEX: 36.58 KG/M2 | DIASTOLIC BLOOD PRESSURE: 72 MMHG | SYSTOLIC BLOOD PRESSURE: 174 MMHG | HEART RATE: 73 BPM

## 2018-08-09 LAB
HCT VFR BLD CALC: 32 %
HGB BLD-MCNC: 10 G/DL
MCHC RBC-ENTMCNC: 26.7 PG
MCHC RBC-ENTMCNC: 31.3 G/DL
MCV RBC AUTO: 85.3 FL
PLATELET # BLD AUTO: 204 K/UL
PMV BLD: 9.5 FL
RBC # BLD: 3.75 M/UL
RBC # FLD: 16.6 %
WBC # FLD AUTO: 7.75 K/UL

## 2018-08-17 ENCOUNTER — APPOINTMENT (OUTPATIENT)
Dept: INFUSION THERAPY | Facility: CLINIC | Age: 68
End: 2018-08-17

## 2018-08-17 ENCOUNTER — LABORATORY RESULT (OUTPATIENT)
Age: 68
End: 2018-08-17

## 2018-08-17 LAB
HCT VFR BLD CALC: 32.5 %
HGB BLD-MCNC: 10.3 G/DL
MCHC RBC-ENTMCNC: 26.9 PG
MCHC RBC-ENTMCNC: 31.7 G/DL
MCV RBC AUTO: 84.9 FL
PLATELET # BLD AUTO: 176 K/UL
PMV BLD: 9.4 FL
RBC # BLD: 3.83 M/UL
RBC # FLD: 15.9 %
WBC # FLD AUTO: 7.79 K/UL

## 2018-08-24 ENCOUNTER — APPOINTMENT (OUTPATIENT)
Dept: INFUSION THERAPY | Facility: CLINIC | Age: 68
End: 2018-08-24

## 2018-08-24 ENCOUNTER — LABORATORY RESULT (OUTPATIENT)
Age: 68
End: 2018-08-24

## 2018-08-31 ENCOUNTER — LABORATORY RESULT (OUTPATIENT)
Age: 68
End: 2018-08-31

## 2018-08-31 ENCOUNTER — APPOINTMENT (OUTPATIENT)
Dept: INFUSION THERAPY | Facility: CLINIC | Age: 68
End: 2018-08-31

## 2018-08-31 RX ORDER — ERYTHROPOIETIN 10000 [IU]/ML
20000 INJECTION, SOLUTION INTRAVENOUS; SUBCUTANEOUS ONCE
Qty: 0 | Refills: 0 | Status: COMPLETED | OUTPATIENT
Start: 2018-08-31 | End: 2018-08-31

## 2018-08-31 RX ADMIN — ERYTHROPOIETIN 20000 UNIT(S): 10000 INJECTION, SOLUTION INTRAVENOUS; SUBCUTANEOUS at 10:30

## 2018-09-04 LAB
HCT VFR BLD CALC: 28.9 %
HCT VFR BLD CALC: 31.1 %
HGB BLD-MCNC: 10.1 G/DL
HGB BLD-MCNC: 9.1 G/DL
MCHC RBC-ENTMCNC: 26.9 PG
MCHC RBC-ENTMCNC: 27.2 PG
MCHC RBC-ENTMCNC: 31.5 G/DL
MCHC RBC-ENTMCNC: 32.5 G/DL
MCV RBC AUTO: 83.6 FL
MCV RBC AUTO: 85.5 FL
PLATELET # BLD AUTO: 167 K/UL
PLATELET # BLD AUTO: 182 K/UL
PMV BLD: 10.2 FL
PMV BLD: 9.6 FL
RBC # BLD: 3.38 M/UL
RBC # BLD: 3.72 M/UL
RBC # FLD: 15.7 %
RBC # FLD: 15.9 %
WBC # FLD AUTO: 10.19 K/UL
WBC # FLD AUTO: 8.7 K/UL

## 2018-09-07 ENCOUNTER — LABORATORY RESULT (OUTPATIENT)
Age: 68
End: 2018-09-07

## 2018-09-07 ENCOUNTER — APPOINTMENT (OUTPATIENT)
Dept: INFUSION THERAPY | Facility: CLINIC | Age: 68
End: 2018-09-07

## 2018-09-07 LAB
HCT VFR BLD CALC: 30.6 %
HGB BLD-MCNC: 9.9 G/DL
MCHC RBC-ENTMCNC: 27.3 PG
MCHC RBC-ENTMCNC: 32.4 G/DL
MCV RBC AUTO: 84.5 FL
PLATELET # BLD AUTO: 189 K/UL
PMV BLD: 10.1 FL
RBC # BLD: 3.62 M/UL
RBC # FLD: 16.2 %
WBC # FLD AUTO: 8.95 K/UL

## 2018-09-07 RX ORDER — ERYTHROPOIETIN 10000 [IU]/ML
20000 INJECTION, SOLUTION INTRAVENOUS; SUBCUTANEOUS ONCE
Qty: 0 | Refills: 0 | Status: COMPLETED | OUTPATIENT
Start: 2018-09-07 | End: 2018-10-05

## 2018-09-07 RX ADMIN — ERYTHROPOIETIN 20000 UNIT(S): 10000 INJECTION, SOLUTION INTRAVENOUS; SUBCUTANEOUS at 10:28

## 2018-09-14 ENCOUNTER — APPOINTMENT (OUTPATIENT)
Dept: INFUSION THERAPY | Facility: CLINIC | Age: 68
End: 2018-09-14

## 2018-09-14 ENCOUNTER — LABORATORY RESULT (OUTPATIENT)
Age: 68
End: 2018-09-14

## 2018-09-14 LAB
HCT VFR BLD CALC: 30.2 %
HGB BLD-MCNC: 9.9 G/DL
MCHC RBC-ENTMCNC: 28.1 PG
MCHC RBC-ENTMCNC: 32.8 G/DL
MCV RBC AUTO: 85.8 FL
PLATELET # BLD AUTO: 175 K/UL
PMV BLD: 10.2 FL
RBC # BLD: 3.52 M/UL
RBC # FLD: 16.9 %
WBC # FLD AUTO: 8.81 K/UL

## 2018-09-14 RX ORDER — ERYTHROPOIETIN 10000 [IU]/ML
20000 INJECTION, SOLUTION INTRAVENOUS; SUBCUTANEOUS ONCE
Qty: 0 | Refills: 0 | Status: COMPLETED | OUTPATIENT
Start: 2018-09-14 | End: 2018-09-14

## 2018-09-14 RX ADMIN — ERYTHROPOIETIN 20000 UNIT(S): 10000 INJECTION, SOLUTION INTRAVENOUS; SUBCUTANEOUS at 10:52

## 2018-09-21 ENCOUNTER — APPOINTMENT (OUTPATIENT)
Dept: INFUSION THERAPY | Facility: CLINIC | Age: 68
End: 2018-09-21

## 2018-09-21 ENCOUNTER — LABORATORY RESULT (OUTPATIENT)
Age: 68
End: 2018-09-21

## 2018-09-21 LAB
HCT VFR BLD CALC: 31.3 %
HGB BLD-MCNC: 10.3 G/DL
MCHC RBC-ENTMCNC: 27.8 PG
MCHC RBC-ENTMCNC: 32.9 G/DL
MCV RBC AUTO: 84.6 FL
PLATELET # BLD AUTO: 191 K/UL
PMV BLD: 9.4 FL
RBC # BLD: 3.7 M/UL
RBC # FLD: 16.6 %
WBC # FLD AUTO: 7.75 K/UL

## 2018-09-28 ENCOUNTER — APPOINTMENT (OUTPATIENT)
Dept: INFUSION THERAPY | Facility: CLINIC | Age: 68
End: 2018-09-28

## 2018-10-05 ENCOUNTER — LABORATORY RESULT (OUTPATIENT)
Age: 68
End: 2018-10-05

## 2018-10-05 ENCOUNTER — APPOINTMENT (OUTPATIENT)
Dept: INFUSION THERAPY | Facility: CLINIC | Age: 68
End: 2018-10-05

## 2018-10-05 VITALS
DIASTOLIC BLOOD PRESSURE: 76 MMHG | SYSTOLIC BLOOD PRESSURE: 164 MMHG | RESPIRATION RATE: 18 BRPM | TEMPERATURE: 96.7 F | HEART RATE: 72 BPM

## 2018-10-05 LAB
HCT VFR BLD CALC: 29 %
HGB BLD-MCNC: 9.5 G/DL
MCHC RBC-ENTMCNC: 28.2 PG
MCHC RBC-ENTMCNC: 32.8 G/DL
MCV RBC AUTO: 86.1 FL
PLATELET # BLD AUTO: 155 K/UL
PMV BLD: 10.3 FL
RBC # BLD: 3.37 M/UL
RBC # FLD: 15.1 %
WBC # FLD AUTO: 7.45 K/UL

## 2018-10-05 RX ORDER — ERYTHROPOIETIN 10000 [IU]/ML
20000 INJECTION, SOLUTION INTRAVENOUS; SUBCUTANEOUS ONCE
Qty: 0 | Refills: 0 | Status: COMPLETED | OUTPATIENT
Start: 2018-10-05 | End: 2018-12-07

## 2018-10-05 RX ADMIN — ERYTHROPOIETIN 20000 UNIT(S): 10000 INJECTION, SOLUTION INTRAVENOUS; SUBCUTANEOUS at 10:46

## 2018-10-12 ENCOUNTER — OUTPATIENT (OUTPATIENT)
Dept: OUTPATIENT SERVICES | Facility: HOSPITAL | Age: 68
LOS: 1 days | Discharge: HOME | End: 2018-10-12

## 2018-10-12 ENCOUNTER — LABORATORY RESULT (OUTPATIENT)
Age: 68
End: 2018-10-12

## 2018-10-12 ENCOUNTER — APPOINTMENT (OUTPATIENT)
Dept: INFUSION THERAPY | Facility: CLINIC | Age: 68
End: 2018-10-12

## 2018-10-12 DIAGNOSIS — D64.9 ANEMIA, UNSPECIFIED: ICD-10-CM

## 2018-10-12 RX ORDER — ERYTHROPOIETIN 10000 [IU]/ML
20000 INJECTION, SOLUTION INTRAVENOUS; SUBCUTANEOUS ONCE
Qty: 0 | Refills: 0 | Status: COMPLETED | OUTPATIENT
Start: 2018-10-12 | End: 2018-10-12

## 2018-10-12 RX ADMIN — ERYTHROPOIETIN 20000 UNIT(S): 10000 INJECTION, SOLUTION INTRAVENOUS; SUBCUTANEOUS at 11:11

## 2018-10-15 LAB
HCT VFR BLD CALC: 28.9 %
HGB BLD-MCNC: 9.6 G/DL
MCHC RBC-ENTMCNC: 28.7 PG
MCHC RBC-ENTMCNC: 33.2 G/DL
MCV RBC AUTO: 86.5 FL
PLATELET # BLD AUTO: 172 K/UL
PMV BLD: 10.1 FL
RBC # BLD: 3.34 M/UL
RBC # FLD: 15.5 %
WBC # FLD AUTO: 8.31 K/UL

## 2018-10-19 ENCOUNTER — LABORATORY RESULT (OUTPATIENT)
Age: 68
End: 2018-10-19

## 2018-10-19 ENCOUNTER — APPOINTMENT (OUTPATIENT)
Dept: INFUSION THERAPY | Facility: CLINIC | Age: 68
End: 2018-10-19

## 2018-10-19 LAB
HCT VFR BLD CALC: 34.5 %
HGB BLD-MCNC: 11.3 G/DL
MCHC RBC-ENTMCNC: 28.4 PG
MCHC RBC-ENTMCNC: 32.8 G/DL
MCV RBC AUTO: 86.7 FL
PLATELET # BLD AUTO: 196 K/UL
PMV BLD: 10 FL
RBC # BLD: 3.98 M/UL
RBC # FLD: 15.8 %
WBC # FLD AUTO: 8.92 K/UL

## 2018-10-26 ENCOUNTER — LABORATORY RESULT (OUTPATIENT)
Age: 68
End: 2018-10-26

## 2018-10-26 ENCOUNTER — APPOINTMENT (OUTPATIENT)
Dept: INFUSION THERAPY | Facility: CLINIC | Age: 68
End: 2018-10-26

## 2018-10-26 LAB
HCT VFR BLD CALC: 32.2 %
HGB BLD-MCNC: 10.6 G/DL
MCHC RBC-ENTMCNC: 28.7 PG
MCHC RBC-ENTMCNC: 32.9 G/DL
MCV RBC AUTO: 87.3 FL
PLATELET # BLD AUTO: 161 K/UL
PMV BLD: 10.1 FL
RBC # BLD: 3.69 M/UL
RBC # FLD: 15.3 %
WBC # FLD AUTO: 7.41 K/UL

## 2018-11-02 ENCOUNTER — APPOINTMENT (OUTPATIENT)
Dept: INFUSION THERAPY | Facility: CLINIC | Age: 68
End: 2018-11-02

## 2018-11-09 ENCOUNTER — LABORATORY RESULT (OUTPATIENT)
Age: 68
End: 2018-11-09

## 2018-11-09 ENCOUNTER — APPOINTMENT (OUTPATIENT)
Dept: INFUSION THERAPY | Facility: CLINIC | Age: 68
End: 2018-11-09

## 2018-11-09 LAB
HCT VFR BLD CALC: 28.3 %
HGB BLD-MCNC: 9.5 G/DL
MCHC RBC-ENTMCNC: 29.4 PG
MCHC RBC-ENTMCNC: 33.6 G/DL
MCV RBC AUTO: 87.6 FL
PLATELET # BLD AUTO: 188 K/UL
PMV BLD: 10.3 FL
RBC # BLD: 3.23 M/UL
RBC # FLD: 14.2 %
WBC # FLD AUTO: 8.13 K/UL

## 2018-11-09 RX ORDER — ERYTHROPOIETIN 10000 [IU]/ML
20000 INJECTION, SOLUTION INTRAVENOUS; SUBCUTANEOUS ONCE
Qty: 0 | Refills: 0 | Status: COMPLETED | OUTPATIENT
Start: 2018-11-09 | End: 2018-11-09

## 2018-11-09 RX ADMIN — ERYTHROPOIETIN 20000 UNIT(S): 10000 INJECTION, SOLUTION INTRAVENOUS; SUBCUTANEOUS at 11:04

## 2018-11-16 ENCOUNTER — APPOINTMENT (OUTPATIENT)
Dept: INFUSION THERAPY | Facility: CLINIC | Age: 68
End: 2018-11-16

## 2018-11-16 RX ORDER — ERYTHROPOIETIN 10000 [IU]/ML
20000 INJECTION, SOLUTION INTRAVENOUS; SUBCUTANEOUS ONCE
Qty: 0 | Refills: 0 | Status: COMPLETED | OUTPATIENT
Start: 2018-11-16 | End: 2018-11-16

## 2018-11-16 RX ADMIN — ERYTHROPOIETIN 20000 UNIT(S): 10000 INJECTION, SOLUTION INTRAVENOUS; SUBCUTANEOUS at 10:18

## 2018-11-23 ENCOUNTER — APPOINTMENT (OUTPATIENT)
Dept: INFUSION THERAPY | Facility: CLINIC | Age: 68
End: 2018-11-23

## 2018-11-23 ENCOUNTER — LABORATORY RESULT (OUTPATIENT)
Age: 68
End: 2018-11-23

## 2018-11-23 LAB
HCT VFR BLD CALC: 26.5 %
HGB BLD-MCNC: 8.7 G/DL
MCHC RBC-ENTMCNC: 29.5 PG
MCHC RBC-ENTMCNC: 32.8 G/DL
MCV RBC AUTO: 89.8 FL
PLATELET # BLD AUTO: 177 K/UL
PMV BLD: 10.3 FL
RBC # BLD: 2.95 M/UL
RBC # FLD: 15.6 %
WBC # FLD AUTO: 7.57 K/UL

## 2018-11-23 RX ORDER — ERYTHROPOIETIN 10000 [IU]/ML
20000 INJECTION, SOLUTION INTRAVENOUS; SUBCUTANEOUS ONCE
Qty: 0 | Refills: 0 | Status: COMPLETED | OUTPATIENT
Start: 2018-11-23 | End: 2018-11-23

## 2018-11-23 RX ADMIN — ERYTHROPOIETIN 20000 UNIT(S): 10000 INJECTION, SOLUTION INTRAVENOUS; SUBCUTANEOUS at 11:16

## 2018-11-30 ENCOUNTER — APPOINTMENT (OUTPATIENT)
Dept: INFUSION THERAPY | Facility: CLINIC | Age: 68
End: 2018-11-30

## 2018-11-30 RX ORDER — ERYTHROPOIETIN 10000 [IU]/ML
20000 INJECTION, SOLUTION INTRAVENOUS; SUBCUTANEOUS ONCE
Qty: 0 | Refills: 0 | Status: COMPLETED | OUTPATIENT
Start: 2018-11-30 | End: 2018-11-30

## 2018-11-30 RX ADMIN — ERYTHROPOIETIN 20000 UNIT(S): 10000 INJECTION, SOLUTION INTRAVENOUS; SUBCUTANEOUS at 10:53

## 2018-12-07 ENCOUNTER — LABORATORY RESULT (OUTPATIENT)
Age: 68
End: 2018-12-07

## 2018-12-07 ENCOUNTER — APPOINTMENT (OUTPATIENT)
Dept: INFUSION THERAPY | Facility: CLINIC | Age: 68
End: 2018-12-07

## 2018-12-07 LAB
HCT VFR BLD CALC: 29.4 %
HGB BLD-MCNC: 9.6 G/DL
MCHC RBC-ENTMCNC: 29.5 PG
MCHC RBC-ENTMCNC: 32.7 G/DL
MCV RBC AUTO: 90.5 FL
PLATELET # BLD AUTO: 196 K/UL
PMV BLD: 9.8 FL
RBC # BLD: 3.25 M/UL
RBC # FLD: 14.8 %
WBC # FLD AUTO: 6.84 K/UL

## 2018-12-07 RX ORDER — ERYTHROPOIETIN 10000 [IU]/ML
20000 INJECTION, SOLUTION INTRAVENOUS; SUBCUTANEOUS ONCE
Qty: 0 | Refills: 0 | Status: COMPLETED | OUTPATIENT
Start: 2018-12-07 | End: 2019-02-14

## 2018-12-07 RX ADMIN — ERYTHROPOIETIN 20000 UNIT(S): 10000 INJECTION, SOLUTION INTRAVENOUS; SUBCUTANEOUS at 11:38

## 2018-12-14 ENCOUNTER — APPOINTMENT (OUTPATIENT)
Dept: INFUSION THERAPY | Facility: CLINIC | Age: 68
End: 2018-12-14

## 2018-12-14 RX ORDER — ERYTHROPOIETIN 10000 [IU]/ML
20000 INJECTION, SOLUTION INTRAVENOUS; SUBCUTANEOUS ONCE
Qty: 0 | Refills: 0 | Status: COMPLETED | OUTPATIENT
Start: 2018-12-14 | End: 2018-12-14

## 2018-12-14 RX ADMIN — ERYTHROPOIETIN 20000 UNIT(S): 10000 INJECTION, SOLUTION INTRAVENOUS; SUBCUTANEOUS at 10:20

## 2018-12-21 ENCOUNTER — LABORATORY RESULT (OUTPATIENT)
Age: 68
End: 2018-12-21

## 2018-12-21 ENCOUNTER — APPOINTMENT (OUTPATIENT)
Dept: INFUSION THERAPY | Facility: CLINIC | Age: 68
End: 2018-12-21

## 2018-12-21 LAB
HCT VFR BLD CALC: 30.5 %
HGB BLD-MCNC: 9.9 G/DL
MCHC RBC-ENTMCNC: 28.8 PG
MCHC RBC-ENTMCNC: 32.5 G/DL
MCV RBC AUTO: 88.7 FL
PLATELET # BLD AUTO: 196 K/UL
PMV BLD: 10 FL
RBC # BLD: 3.44 M/UL
RBC # FLD: 13.9 %
WBC # FLD AUTO: 7.3 K/UL

## 2018-12-21 RX ORDER — ERYTHROPOIETIN 10000 [IU]/ML
20000 INJECTION, SOLUTION INTRAVENOUS; SUBCUTANEOUS ONCE
Qty: 0 | Refills: 0 | Status: COMPLETED | OUTPATIENT
Start: 2018-12-21 | End: 2018-12-21

## 2018-12-21 RX ADMIN — ERYTHROPOIETIN 20000 UNIT(S): 10000 INJECTION, SOLUTION INTRAVENOUS; SUBCUTANEOUS at 10:47

## 2018-12-28 ENCOUNTER — APPOINTMENT (OUTPATIENT)
Dept: INFUSION THERAPY | Facility: CLINIC | Age: 68
End: 2018-12-28

## 2018-12-28 RX ORDER — ERYTHROPOIETIN 10000 [IU]/ML
20000 INJECTION, SOLUTION INTRAVENOUS; SUBCUTANEOUS ONCE
Qty: 0 | Refills: 0 | Status: COMPLETED | OUTPATIENT
Start: 2018-12-28 | End: 2018-12-28

## 2018-12-28 RX ADMIN — ERYTHROPOIETIN 20000 UNIT(S): 10000 INJECTION, SOLUTION INTRAVENOUS; SUBCUTANEOUS at 11:15

## 2019-01-04 ENCOUNTER — APPOINTMENT (OUTPATIENT)
Dept: INFUSION THERAPY | Facility: CLINIC | Age: 69
End: 2019-01-04

## 2019-01-04 ENCOUNTER — LABORATORY RESULT (OUTPATIENT)
Age: 69
End: 2019-01-04

## 2019-01-04 LAB
HCT VFR BLD CALC: 31.1 %
HGB BLD-MCNC: 10.1 G/DL
MCHC RBC-ENTMCNC: 29.3 PG
MCHC RBC-ENTMCNC: 32.5 G/DL
MCV RBC AUTO: 90.1 FL
PLATELET # BLD AUTO: 191 K/UL
PMV BLD: 10.3 FL
RBC # BLD: 3.45 M/UL
RBC # FLD: 14.6 %
WBC # FLD AUTO: 8.62 K/UL

## 2019-01-11 ENCOUNTER — APPOINTMENT (OUTPATIENT)
Dept: INFUSION THERAPY | Facility: CLINIC | Age: 69
End: 2019-01-11

## 2019-01-11 RX ORDER — ERYTHROPOIETIN 10000 [IU]/ML
20000 INJECTION, SOLUTION INTRAVENOUS; SUBCUTANEOUS ONCE
Qty: 0 | Refills: 0 | Status: COMPLETED | OUTPATIENT
Start: 2019-01-11 | End: 2019-01-11

## 2019-01-11 RX ADMIN — ERYTHROPOIETIN 20000 UNIT(S): 10000 INJECTION, SOLUTION INTRAVENOUS; SUBCUTANEOUS at 11:18

## 2019-01-15 NOTE — ASU PATIENT PROFILE, ADULT - PMH
Afib    Chronic gout    CKD (chronic kidney disease)    Diabetes    HTN (hypertension)    LUPE (obstructive sleep apnea)

## 2019-01-16 ENCOUNTER — OUTPATIENT (OUTPATIENT)
Dept: OUTPATIENT SERVICES | Facility: HOSPITAL | Age: 69
LOS: 1 days | Discharge: HOME | End: 2019-01-16

## 2019-01-16 VITALS
TEMPERATURE: 97 F | RESPIRATION RATE: 17 BRPM | DIASTOLIC BLOOD PRESSURE: 72 MMHG | HEIGHT: 68 IN | OXYGEN SATURATION: 100 % | SYSTOLIC BLOOD PRESSURE: 160 MMHG | HEART RATE: 68 BPM | WEIGHT: 242.95 LBS

## 2019-01-16 VITALS — DIASTOLIC BLOOD PRESSURE: 71 MMHG | RESPIRATION RATE: 17 BRPM | SYSTOLIC BLOOD PRESSURE: 143 MMHG | HEART RATE: 63 BPM

## 2019-01-16 DIAGNOSIS — Z98.41 CATARACT EXTRACTION STATUS, RIGHT EYE: Chronic | ICD-10-CM

## 2019-01-16 DIAGNOSIS — Z98.890 OTHER SPECIFIED POSTPROCEDURAL STATES: Chronic | ICD-10-CM

## 2019-01-16 LAB — GLUCOSE BLDC GLUCOMTR-MCNC: 103 MG/DL — HIGH (ref 70–99)

## 2019-01-16 RX ORDER — SODIUM CHLORIDE 9 MG/ML
1000 INJECTION, SOLUTION INTRAVENOUS
Qty: 0 | Refills: 0 | Status: DISCONTINUED | OUTPATIENT
Start: 2019-01-16 | End: 2019-01-31

## 2019-01-16 RX ORDER — ONDANSETRON 8 MG/1
4 TABLET, FILM COATED ORAL ONCE
Qty: 0 | Refills: 0 | Status: DISCONTINUED | OUTPATIENT
Start: 2019-01-16 | End: 2019-01-31

## 2019-01-16 NOTE — PRE-ANESTHESIA EVALUATION ADULT - NSANTHPMHFT_GEN_ALL_CORE
Chart reviewed, pt interviewed and examined. Cardiology evaluation seen. DM took Lantus 8U am today, FS 103mg/dl.  AFib on Coumadin Off 5 days, cardiology aware, INR 1.3 today. LUPE,CKD, Obesity, Anemia.

## 2019-01-18 ENCOUNTER — APPOINTMENT (OUTPATIENT)
Dept: INFUSION THERAPY | Facility: CLINIC | Age: 69
End: 2019-01-18

## 2019-01-18 ENCOUNTER — LABORATORY RESULT (OUTPATIENT)
Age: 69
End: 2019-01-18

## 2019-01-18 PROBLEM — E11.9 TYPE 2 DIABETES MELLITUS WITHOUT COMPLICATIONS: Chronic | Status: ACTIVE | Noted: 2019-01-16

## 2019-01-18 PROBLEM — M1A.9XX0 CHRONIC GOUT, UNSPECIFIED, WITHOUT TOPHUS (TOPHI): Chronic | Status: ACTIVE | Noted: 2019-01-16

## 2019-01-18 PROBLEM — G47.33 OBSTRUCTIVE SLEEP APNEA (ADULT) (PEDIATRIC): Chronic | Status: ACTIVE | Noted: 2019-01-16

## 2019-01-18 PROBLEM — I10 ESSENTIAL (PRIMARY) HYPERTENSION: Chronic | Status: ACTIVE | Noted: 2019-01-16

## 2019-01-18 LAB
HCT VFR BLD CALC: 31.8 %
HGB BLD-MCNC: 10.2 G/DL
MCHC RBC-ENTMCNC: 28.2 PG
MCHC RBC-ENTMCNC: 32.1 G/DL
MCV RBC AUTO: 87.8 FL
PLATELET # BLD AUTO: 195 K/UL
PMV BLD: 9.8 FL
RBC # BLD: 3.62 M/UL
RBC # FLD: 14.2 %
WBC # FLD AUTO: 9.91 K/UL

## 2019-01-25 ENCOUNTER — LABORATORY RESULT (OUTPATIENT)
Age: 69
End: 2019-01-25

## 2019-01-25 ENCOUNTER — OUTPATIENT (OUTPATIENT)
Dept: OUTPATIENT SERVICES | Facility: HOSPITAL | Age: 69
LOS: 1 days | Discharge: HOME | End: 2019-01-25

## 2019-01-25 ENCOUNTER — APPOINTMENT (OUTPATIENT)
Dept: INFUSION THERAPY | Facility: CLINIC | Age: 69
End: 2019-01-25

## 2019-01-25 DIAGNOSIS — Z98.890 OTHER SPECIFIED POSTPROCEDURAL STATES: Chronic | ICD-10-CM

## 2019-01-25 DIAGNOSIS — Z98.41 CATARACT EXTRACTION STATUS, RIGHT EYE: Chronic | ICD-10-CM

## 2019-01-25 DIAGNOSIS — C79.2 SECONDARY MALIGNANT NEOPLASM OF SKIN: ICD-10-CM

## 2019-01-25 LAB
HCT VFR BLD CALC: 33 %
HGB BLD-MCNC: 10.7 G/DL
MCHC RBC-ENTMCNC: 28.1 PG
MCHC RBC-ENTMCNC: 32.4 G/DL
MCV RBC AUTO: 86.6 FL
PLATELET # BLD AUTO: 193 K/UL
PMV BLD: 10.1 FL
RBC # BLD: 3.81 M/UL
RBC # FLD: 14.2 %
WBC # FLD AUTO: 8.41 K/UL

## 2019-01-26 DIAGNOSIS — E11.22 TYPE 2 DIABETES MELLITUS WITH DIABETIC CHRONIC KIDNEY DISEASE: ICD-10-CM

## 2019-01-26 DIAGNOSIS — Z79.01 LONG TERM (CURRENT) USE OF ANTICOAGULANTS: ICD-10-CM

## 2019-01-26 DIAGNOSIS — I48.91 UNSPECIFIED ATRIAL FIBRILLATION: ICD-10-CM

## 2019-01-26 DIAGNOSIS — Z88.8 ALLERGY STATUS TO OTHER DRUGS, MEDICAMENTS AND BIOLOGICAL SUBSTANCES: ICD-10-CM

## 2019-01-26 DIAGNOSIS — H25.12 AGE-RELATED NUCLEAR CATARACT, LEFT EYE: ICD-10-CM

## 2019-01-26 DIAGNOSIS — G47.33 OBSTRUCTIVE SLEEP APNEA (ADULT) (PEDIATRIC): ICD-10-CM

## 2019-01-26 DIAGNOSIS — Z79.4 LONG TERM (CURRENT) USE OF INSULIN: ICD-10-CM

## 2019-01-26 DIAGNOSIS — N18.9 CHRONIC KIDNEY DISEASE, UNSPECIFIED: ICD-10-CM

## 2019-01-26 DIAGNOSIS — I12.9 HYPERTENSIVE CHRONIC KIDNEY DISEASE WITH STAGE 1 THROUGH STAGE 4 CHRONIC KIDNEY DISEASE, OR UNSPECIFIED CHRONIC KIDNEY DISEASE: ICD-10-CM

## 2019-02-01 ENCOUNTER — APPOINTMENT (OUTPATIENT)
Dept: INFUSION THERAPY | Facility: CLINIC | Age: 69
End: 2019-02-01

## 2019-02-01 ENCOUNTER — LABORATORY RESULT (OUTPATIENT)
Age: 69
End: 2019-02-01

## 2019-02-01 LAB
HCT VFR BLD CALC: 29.2 %
HGB BLD-MCNC: 9.4 G/DL
MCHC RBC-ENTMCNC: 28.1 PG
MCHC RBC-ENTMCNC: 32.2 G/DL
MCV RBC AUTO: 87.4 FL
PLATELET # BLD AUTO: 170 K/UL
PMV BLD: 9.8 FL
RBC # BLD: 3.34 M/UL
RBC # FLD: 14 %
WBC # FLD AUTO: 7.94 K/UL

## 2019-02-01 RX ORDER — ERYTHROPOIETIN 10000 [IU]/ML
20000 INJECTION, SOLUTION INTRAVENOUS; SUBCUTANEOUS ONCE
Qty: 0 | Refills: 0 | Status: COMPLETED | OUTPATIENT
Start: 2019-02-01 | End: 2019-02-01

## 2019-02-01 RX ADMIN — ERYTHROPOIETIN 20000 UNIT(S): 10000 INJECTION, SOLUTION INTRAVENOUS; SUBCUTANEOUS at 11:24

## 2019-02-08 ENCOUNTER — APPOINTMENT (OUTPATIENT)
Dept: INFUSION THERAPY | Facility: CLINIC | Age: 69
End: 2019-02-08

## 2019-02-08 VITALS — BODY MASS INDEX: 35.89 KG/M2 | WEIGHT: 242.99 LBS

## 2019-02-08 RX ORDER — ERYTHROPOIETIN 10000 [IU]/ML
20000 INJECTION, SOLUTION INTRAVENOUS; SUBCUTANEOUS ONCE
Qty: 0 | Refills: 0 | Status: COMPLETED | OUTPATIENT
Start: 2019-02-08 | End: 2019-02-08

## 2019-02-08 RX ADMIN — ERYTHROPOIETIN 20000 UNIT(S): 10000 INJECTION, SOLUTION INTRAVENOUS; SUBCUTANEOUS at 10:53

## 2019-02-11 LAB
ALBUMIN SERPL ELPH-MCNC: 3.9 G/DL
ALP BLD-CCNC: 107 U/L
ALT SERPL-CCNC: 14 U/L
ANION GAP SERPL CALC-SCNC: 19 MMOL/L
AST SERPL-CCNC: 12 U/L
BILIRUB SERPL-MCNC: 0.3 MG/DL
BUN SERPL-MCNC: 70 MG/DL
CALCIUM SERPL-MCNC: 8.6 MG/DL
CHLORIDE SERPL-SCNC: 101 MMOL/L
CO2 SERPL-SCNC: 21 MMOL/L
CREAT SERPL-MCNC: 3.9 MG/DL
GLUCOSE SERPL-MCNC: 251 MG/DL
POTASSIUM SERPL-SCNC: 5.1 MMOL/L
PROT SERPL-MCNC: 6.7 G/DL
SODIUM SERPL-SCNC: 141 MMOL/L

## 2019-02-14 ENCOUNTER — LABORATORY RESULT (OUTPATIENT)
Age: 69
End: 2019-02-14

## 2019-02-14 ENCOUNTER — APPOINTMENT (OUTPATIENT)
Dept: HEMATOLOGY ONCOLOGY | Facility: CLINIC | Age: 69
End: 2019-02-14

## 2019-02-14 ENCOUNTER — APPOINTMENT (OUTPATIENT)
Dept: INFUSION THERAPY | Facility: CLINIC | Age: 69
End: 2019-02-14

## 2019-02-14 VITALS
SYSTOLIC BLOOD PRESSURE: 164 MMHG | RESPIRATION RATE: 14 BRPM | BODY MASS INDEX: 37.03 KG/M2 | DIASTOLIC BLOOD PRESSURE: 74 MMHG | TEMPERATURE: 96.5 F | HEART RATE: 76 BPM | WEIGHT: 250 LBS | HEIGHT: 69 IN

## 2019-02-14 LAB
HCT VFR BLD CALC: 30.6 %
HGB BLD-MCNC: 10 G/DL
MCHC RBC-ENTMCNC: 28.3 PG
MCHC RBC-ENTMCNC: 32.7 G/DL
MCV RBC AUTO: 86.7 FL
PLATELET # BLD AUTO: 188 K/UL
PMV BLD: 10 FL
RBC # BLD: 3.53 M/UL
RBC # FLD: 15.1 %
WBC # FLD AUTO: 8.1 K/UL

## 2019-02-14 RX ORDER — ERYTHROPOIETIN 10000 [IU]/ML
20000 INJECTION, SOLUTION INTRAVENOUS; SUBCUTANEOUS ONCE
Qty: 0 | Refills: 0 | Status: COMPLETED | OUTPATIENT
Start: 2019-02-14 | End: 2019-02-14

## 2019-02-14 RX ADMIN — ERYTHROPOIETIN 20000 UNIT(S): 10000 INJECTION, SOLUTION INTRAVENOUS; SUBCUTANEOUS at 11:56

## 2019-02-14 NOTE — PHYSICAL EXAM
[Fully active, able to carry on all pre-disease performance without restriction] : Status 0 - Fully active, able to carry on all pre-disease performance without restriction [Obese] : obese [Normal] : grossly intact [de-identified] : Basal cell carcinoma removed , band aid still in place on the right side of nose. [de-identified] :  distant heart sounds [de-identified] : Trace to 1+ non-pitting edema, right greater left with chronic trophic skin changes. [de-identified] : Some arthritic changes

## 2019-02-14 NOTE — REVIEW OF SYSTEMS
[Vision Problems] : vision problems [SOB on Exertion] : shortness of breath during exertion [Easy Bruising] : a tendency for easy bruising [Negative] : Endocrine [FreeTextEntry3] : Vitreous hemorrhage, now resolved  [FreeTextEntry6] : Has sleep apnea [de-identified] : On blood thinners

## 2019-02-14 NOTE — ASSESSMENT
[FreeTextEntry1] : Anemia of chronic disease including kidney disease, responding to Procrit, relatively low dose (20,000 u weekly).\par \par PLAN:\par Anemia of chronic disease:\par - Hgb today 10.0. \par - Procrit will be given today\par - Patient to have CBC repeated when he returns from [place] \par \par \par The patient returns to clinic in 6 months to see Dr. Hooper again and as needed. However, his hemogram will be monitored more frequently.\par Patient seen and examined with Dr Hooper, who agreed with the above assessment and plan.

## 2019-02-14 NOTE — HISTORY OF PRESENT ILLNESS
[Disease:__________________________] : Disease: [unfilled] [de-identified] : The patient is coming for his regularly scheduled follow up for his chronic anemia secondary to "inflammation" and kidney disease.\par Overall, he is feeling well except that he had developed an infection in the right armpit which started about 2 weeks ago. The lesion in that area was needled but apparently got worse and it was needled again. Presently, the area is not painful but it's not clear if the infection is clearing.\par Otherwise, everything else is stable. [de-identified] : 2/14/19\par Patient here for follow up of anemia. \par Patient feels well overall, has some mild shortness of breath. Also has easy bruising and had a vitreous hemorrhage 2 weeks ago. \par Had basal carcinoma removal 3 weeks ago.\par No new medications.

## 2019-02-22 ENCOUNTER — APPOINTMENT (OUTPATIENT)
Dept: INFUSION THERAPY | Facility: CLINIC | Age: 69
End: 2019-02-22

## 2019-03-01 ENCOUNTER — APPOINTMENT (OUTPATIENT)
Dept: INFUSION THERAPY | Facility: CLINIC | Age: 69
End: 2019-03-01

## 2019-03-01 ENCOUNTER — LABORATORY RESULT (OUTPATIENT)
Age: 69
End: 2019-03-01

## 2019-03-01 VITALS
DIASTOLIC BLOOD PRESSURE: 73 MMHG | HEART RATE: 65 BPM | TEMPERATURE: 97 F | RESPIRATION RATE: 18 BRPM | SYSTOLIC BLOOD PRESSURE: 150 MMHG

## 2019-03-01 LAB
HCT VFR BLD CALC: 28.9 %
HGB BLD-MCNC: 9.4 G/DL
MCHC RBC-ENTMCNC: 28.4 PG
MCHC RBC-ENTMCNC: 32.5 G/DL
MCV RBC AUTO: 87.3 FL
PLATELET # BLD AUTO: 185 K/UL
PMV BLD: 10 FL
RBC # BLD: 3.31 M/UL
RBC # FLD: 15.3 %
WBC # FLD AUTO: 7.34 K/UL

## 2019-03-01 RX ORDER — ERYTHROPOIETIN 10000 [IU]/ML
20000 INJECTION, SOLUTION INTRAVENOUS; SUBCUTANEOUS ONCE
Qty: 0 | Refills: 0 | Status: COMPLETED | OUTPATIENT
Start: 2019-03-01 | End: 2019-03-01

## 2019-03-01 RX ADMIN — ERYTHROPOIETIN 20000 UNIT(S): 10000 INJECTION, SOLUTION INTRAVENOUS; SUBCUTANEOUS at 11:21

## 2019-03-08 ENCOUNTER — APPOINTMENT (OUTPATIENT)
Dept: INFUSION THERAPY | Facility: CLINIC | Age: 69
End: 2019-03-08

## 2019-03-08 VITALS
RESPIRATION RATE: 18 BRPM | HEART RATE: 70 BPM | TEMPERATURE: 96.4 F | SYSTOLIC BLOOD PRESSURE: 140 MMHG | DIASTOLIC BLOOD PRESSURE: 63 MMHG

## 2019-03-08 RX ORDER — ERYTHROPOIETIN 10000 [IU]/ML
20000 INJECTION, SOLUTION INTRAVENOUS; SUBCUTANEOUS ONCE
Qty: 0 | Refills: 0 | Status: COMPLETED | OUTPATIENT
Start: 2019-03-08 | End: 2019-03-08

## 2019-03-08 RX ADMIN — ERYTHROPOIETIN 20000 UNIT(S): 10000 INJECTION, SOLUTION INTRAVENOUS; SUBCUTANEOUS at 10:41

## 2019-03-15 ENCOUNTER — LABORATORY RESULT (OUTPATIENT)
Age: 69
End: 2019-03-15

## 2019-03-15 ENCOUNTER — APPOINTMENT (OUTPATIENT)
Dept: INFUSION THERAPY | Facility: CLINIC | Age: 69
End: 2019-03-15

## 2019-03-15 VITALS
RESPIRATION RATE: 18 BRPM | SYSTOLIC BLOOD PRESSURE: 150 MMHG | HEART RATE: 74 BPM | DIASTOLIC BLOOD PRESSURE: 71 MMHG | TEMPERATURE: 96.2 F

## 2019-03-15 LAB
HCT VFR BLD CALC: 29.4 %
HGB BLD-MCNC: 9.8 G/DL
MCHC RBC-ENTMCNC: 29 PG
MCHC RBC-ENTMCNC: 33.3 G/DL
MCV RBC AUTO: 87 FL
PLATELET # BLD AUTO: 184 K/UL
PMV BLD: 10.2 FL
RBC # BLD: 3.38 M/UL
RBC # FLD: 16 %
WBC # FLD AUTO: 6.93 K/UL

## 2019-03-15 RX ORDER — ERYTHROPOIETIN 10000 [IU]/ML
20000 INJECTION, SOLUTION INTRAVENOUS; SUBCUTANEOUS ONCE
Qty: 0 | Refills: 0 | Status: COMPLETED | OUTPATIENT
Start: 2019-03-15 | End: 2019-03-15

## 2019-03-15 RX ADMIN — ERYTHROPOIETIN 20000 UNIT(S): 10000 INJECTION, SOLUTION INTRAVENOUS; SUBCUTANEOUS at 11:28

## 2019-03-22 ENCOUNTER — APPOINTMENT (OUTPATIENT)
Dept: INFUSION THERAPY | Facility: CLINIC | Age: 69
End: 2019-03-22

## 2019-03-22 RX ORDER — ERYTHROPOIETIN 10000 [IU]/ML
20000 INJECTION, SOLUTION INTRAVENOUS; SUBCUTANEOUS ONCE
Qty: 0 | Refills: 0 | Status: COMPLETED | OUTPATIENT
Start: 2019-03-22 | End: 2019-03-22

## 2019-03-22 RX ADMIN — ERYTHROPOIETIN 20000 UNIT(S): 10000 INJECTION, SOLUTION INTRAVENOUS; SUBCUTANEOUS at 11:09

## 2019-03-29 ENCOUNTER — APPOINTMENT (OUTPATIENT)
Dept: INFUSION THERAPY | Facility: CLINIC | Age: 69
End: 2019-03-29

## 2019-03-29 ENCOUNTER — LABORATORY RESULT (OUTPATIENT)
Age: 69
End: 2019-03-29

## 2019-03-29 VITALS
RESPIRATION RATE: 18 BRPM | HEART RATE: 68 BPM | DIASTOLIC BLOOD PRESSURE: 69 MMHG | TEMPERATURE: 96.5 F | SYSTOLIC BLOOD PRESSURE: 150 MMHG

## 2019-03-29 LAB
HCT VFR BLD CALC: 29.1 %
HGB BLD-MCNC: 9.3 G/DL
MCHC RBC-ENTMCNC: 28.7 PG
MCHC RBC-ENTMCNC: 32 G/DL
MCV RBC AUTO: 89.8 FL
PLATELET # BLD AUTO: 197 K/UL
PMV BLD: 10.2 FL
RBC # BLD: 3.24 M/UL
RBC # FLD: 16.1 %
WBC # FLD AUTO: 8.55 K/UL

## 2019-03-29 RX ORDER — ERYTHROPOIETIN 10000 [IU]/ML
20000 INJECTION, SOLUTION INTRAVENOUS; SUBCUTANEOUS ONCE
Qty: 0 | Refills: 0 | Status: COMPLETED | OUTPATIENT
Start: 2019-03-29 | End: 2019-03-29

## 2019-03-29 RX ADMIN — ERYTHROPOIETIN 20000 UNIT(S): 10000 INJECTION, SOLUTION INTRAVENOUS; SUBCUTANEOUS at 11:40

## 2019-04-03 VITALS
RESPIRATION RATE: 18 BRPM | SYSTOLIC BLOOD PRESSURE: 152 MMHG | TEMPERATURE: 97.8 F | DIASTOLIC BLOOD PRESSURE: 72 MMHG | HEART RATE: 69 BPM

## 2019-04-05 ENCOUNTER — APPOINTMENT (OUTPATIENT)
Dept: INFUSION THERAPY | Facility: CLINIC | Age: 69
End: 2019-04-05

## 2019-04-05 VITALS
SYSTOLIC BLOOD PRESSURE: 169 MMHG | HEART RATE: 72 BPM | TEMPERATURE: 96.1 F | RESPIRATION RATE: 18 BRPM | DIASTOLIC BLOOD PRESSURE: 73 MMHG

## 2019-04-05 RX ORDER — ERYTHROPOIETIN 10000 [IU]/ML
20000 INJECTION, SOLUTION INTRAVENOUS; SUBCUTANEOUS ONCE
Qty: 0 | Refills: 0 | Status: COMPLETED | OUTPATIENT
Start: 2019-04-05 | End: 2019-04-05

## 2019-04-05 RX ADMIN — ERYTHROPOIETIN 20000 UNIT(S): 10000 INJECTION, SOLUTION INTRAVENOUS; SUBCUTANEOUS at 11:13

## 2019-04-12 ENCOUNTER — APPOINTMENT (OUTPATIENT)
Dept: INFUSION THERAPY | Facility: CLINIC | Age: 69
End: 2019-04-12

## 2019-04-12 ENCOUNTER — LABORATORY RESULT (OUTPATIENT)
Age: 69
End: 2019-04-12

## 2019-04-12 LAB
HCT VFR BLD CALC: 29.5 %
HGB BLD-MCNC: 9.6 G/DL
MCHC RBC-ENTMCNC: 29.1 PG
MCHC RBC-ENTMCNC: 32.5 G/DL
MCV RBC AUTO: 89.4 FL
PLATELET # BLD AUTO: 180 K/UL
PMV BLD: 9.8 FL
RBC # BLD: 3.3 M/UL
RBC # FLD: 15.2 %
WBC # FLD AUTO: 8.01 K/UL

## 2019-04-12 RX ORDER — ERYTHROPOIETIN 10000 [IU]/ML
20000 INJECTION, SOLUTION INTRAVENOUS; SUBCUTANEOUS ONCE
Qty: 0 | Refills: 0 | Status: COMPLETED | OUTPATIENT
Start: 2019-04-12 | End: 2019-04-12

## 2019-04-12 RX ADMIN — ERYTHROPOIETIN 20000 UNIT(S): 10000 INJECTION, SOLUTION INTRAVENOUS; SUBCUTANEOUS at 11:10

## 2019-04-18 ENCOUNTER — OUTPATIENT (OUTPATIENT)
Dept: OUTPATIENT SERVICES | Facility: HOSPITAL | Age: 69
LOS: 1 days | Discharge: HOME | End: 2019-04-18

## 2019-04-18 DIAGNOSIS — Z98.890 OTHER SPECIFIED POSTPROCEDURAL STATES: Chronic | ICD-10-CM

## 2019-04-18 DIAGNOSIS — Z98.41 CATARACT EXTRACTION STATUS, RIGHT EYE: Chronic | ICD-10-CM

## 2019-04-18 DIAGNOSIS — N18.3 CHRONIC KIDNEY DISEASE, STAGE 3 (MODERATE): ICD-10-CM

## 2019-04-18 DIAGNOSIS — I10 ESSENTIAL (PRIMARY) HYPERTENSION: ICD-10-CM

## 2019-04-18 DIAGNOSIS — E11.9 TYPE 2 DIABETES MELLITUS WITHOUT COMPLICATIONS: ICD-10-CM

## 2019-04-19 ENCOUNTER — APPOINTMENT (OUTPATIENT)
Dept: INFUSION THERAPY | Facility: CLINIC | Age: 69
End: 2019-04-19

## 2019-04-19 VITALS
TEMPERATURE: 96.4 F | RESPIRATION RATE: 18 BRPM | DIASTOLIC BLOOD PRESSURE: 77 MMHG | SYSTOLIC BLOOD PRESSURE: 163 MMHG | HEART RATE: 80 BPM

## 2019-04-19 RX ORDER — ERYTHROPOIETIN 10000 [IU]/ML
20000 INJECTION, SOLUTION INTRAVENOUS; SUBCUTANEOUS ONCE
Qty: 0 | Refills: 0 | Status: COMPLETED | OUTPATIENT
Start: 2019-04-19 | End: 2019-04-19

## 2019-04-19 RX ADMIN — ERYTHROPOIETIN 20000 UNIT(S): 10000 INJECTION, SOLUTION INTRAVENOUS; SUBCUTANEOUS at 11:13

## 2019-04-20 ENCOUNTER — TRANSCRIPTION ENCOUNTER (OUTPATIENT)
Age: 69
End: 2019-04-20

## 2019-04-26 ENCOUNTER — APPOINTMENT (OUTPATIENT)
Dept: INFUSION THERAPY | Facility: CLINIC | Age: 69
End: 2019-04-26

## 2019-04-26 ENCOUNTER — LABORATORY RESULT (OUTPATIENT)
Age: 69
End: 2019-04-26

## 2019-04-26 LAB
HCT VFR BLD CALC: 31.2 %
HGB BLD-MCNC: 9.9 G/DL
MCHC RBC-ENTMCNC: 28.2 PG
MCHC RBC-ENTMCNC: 31.7 G/DL
MCV RBC AUTO: 88.9 FL
PLATELET # BLD AUTO: 194 K/UL
PMV BLD: 10 FL
RBC # BLD: 3.51 M/UL
RBC # FLD: 15.1 %
WBC # FLD AUTO: 8.65 K/UL

## 2019-04-26 RX ORDER — ERYTHROPOIETIN 10000 [IU]/ML
20000 INJECTION, SOLUTION INTRAVENOUS; SUBCUTANEOUS ONCE
Qty: 0 | Refills: 0 | Status: COMPLETED | OUTPATIENT
Start: 2019-04-26 | End: 2019-04-26

## 2019-04-26 RX ADMIN — ERYTHROPOIETIN 20000 UNIT(S): 10000 INJECTION, SOLUTION INTRAVENOUS; SUBCUTANEOUS at 11:25

## 2019-05-03 ENCOUNTER — APPOINTMENT (OUTPATIENT)
Dept: INFUSION THERAPY | Facility: CLINIC | Age: 69
End: 2019-05-03

## 2019-05-03 ENCOUNTER — OUTPATIENT (OUTPATIENT)
Dept: OUTPATIENT SERVICES | Facility: HOSPITAL | Age: 69
LOS: 1 days | Discharge: HOME | End: 2019-05-03

## 2019-05-03 VITALS
HEART RATE: 69 BPM | TEMPERATURE: 97.8 F | SYSTOLIC BLOOD PRESSURE: 152 MMHG | RESPIRATION RATE: 18 BRPM | DIASTOLIC BLOOD PRESSURE: 72 MMHG

## 2019-05-03 DIAGNOSIS — C79.2 SECONDARY MALIGNANT NEOPLASM OF SKIN: ICD-10-CM

## 2019-05-03 DIAGNOSIS — Z98.41 CATARACT EXTRACTION STATUS, RIGHT EYE: Chronic | ICD-10-CM

## 2019-05-03 DIAGNOSIS — Z98.890 OTHER SPECIFIED POSTPROCEDURAL STATES: Chronic | ICD-10-CM

## 2019-05-03 DIAGNOSIS — D64.9 ANEMIA, UNSPECIFIED: ICD-10-CM

## 2019-05-03 RX ORDER — ERYTHROPOIETIN 10000 [IU]/ML
20000 INJECTION, SOLUTION INTRAVENOUS; SUBCUTANEOUS ONCE
Qty: 0 | Refills: 0 | Status: COMPLETED | OUTPATIENT
Start: 2019-05-03 | End: 2019-05-03

## 2019-05-03 RX ADMIN — ERYTHROPOIETIN 20000 UNIT(S): 10000 INJECTION, SOLUTION INTRAVENOUS; SUBCUTANEOUS at 10:33

## 2019-05-10 ENCOUNTER — APPOINTMENT (OUTPATIENT)
Dept: INFUSION THERAPY | Facility: CLINIC | Age: 69
End: 2019-05-10

## 2019-05-10 ENCOUNTER — LABORATORY RESULT (OUTPATIENT)
Age: 69
End: 2019-05-10

## 2019-05-10 VITALS
HEART RATE: 68 BPM | RESPIRATION RATE: 18 BRPM | SYSTOLIC BLOOD PRESSURE: 160 MMHG | TEMPERATURE: 96.9 F | DIASTOLIC BLOOD PRESSURE: 78 MMHG

## 2019-05-10 LAB
HCT VFR BLD CALC: 31.8 %
HGB BLD-MCNC: 10.4 G/DL
MCHC RBC-ENTMCNC: 28.8 PG
MCHC RBC-ENTMCNC: 32.7 G/DL
MCV RBC AUTO: 88.1 FL
PLATELET # BLD AUTO: 200 K/UL
PMV BLD: 10.1 FL
RBC # BLD: 3.61 M/UL
RBC # FLD: 15 %
WBC # FLD AUTO: 7.72 K/UL

## 2019-05-17 ENCOUNTER — APPOINTMENT (OUTPATIENT)
Dept: INFUSION THERAPY | Facility: CLINIC | Age: 69
End: 2019-05-17

## 2019-05-17 ENCOUNTER — LABORATORY RESULT (OUTPATIENT)
Age: 69
End: 2019-05-17

## 2019-05-17 RX ORDER — ERYTHROPOIETIN 10000 [IU]/ML
20000 INJECTION, SOLUTION INTRAVENOUS; SUBCUTANEOUS ONCE
Refills: 0 | Status: COMPLETED | OUTPATIENT
Start: 2019-05-17 | End: 2019-05-17

## 2019-05-17 RX ADMIN — ERYTHROPOIETIN 20000 UNIT(S): 10000 INJECTION, SOLUTION INTRAVENOUS; SUBCUTANEOUS at 12:37

## 2019-05-20 LAB
HCT VFR BLD CALC: 25.6 %
HGB BLD-MCNC: 8.3 G/DL
MCHC RBC-ENTMCNC: 28.7 PG
MCHC RBC-ENTMCNC: 32.4 G/DL
MCV RBC AUTO: 88.6 FL
PLATELET # BLD AUTO: 182 K/UL
PMV BLD: NORMAL
RBC # BLD: 2.89 M/UL
RBC # FLD: 14.6 %
WBC # FLD AUTO: 7.61 K/UL

## 2019-05-24 ENCOUNTER — APPOINTMENT (OUTPATIENT)
Dept: INFUSION THERAPY | Facility: CLINIC | Age: 69
End: 2019-05-24

## 2019-05-24 ENCOUNTER — LABORATORY RESULT (OUTPATIENT)
Age: 69
End: 2019-05-24

## 2019-05-24 VITALS
SYSTOLIC BLOOD PRESSURE: 140 MMHG | TEMPERATURE: 97.4 F | DIASTOLIC BLOOD PRESSURE: 80 MMHG | RESPIRATION RATE: 18 BRPM | HEART RATE: 72 BPM

## 2019-05-24 LAB
HCT VFR BLD CALC: 30.5 %
HGB BLD-MCNC: 10 G/DL
MCHC RBC-ENTMCNC: 28.9 PG
MCHC RBC-ENTMCNC: 32.8 G/DL
MCV RBC AUTO: 88.2 FL
PLATELET # BLD AUTO: 187 K/UL
PMV BLD: 10.4 FL
RBC # BLD: 3.46 M/UL
RBC # FLD: 15 %
WBC # FLD AUTO: 8.34 K/UL

## 2019-05-31 ENCOUNTER — APPOINTMENT (OUTPATIENT)
Dept: INFUSION THERAPY | Facility: CLINIC | Age: 69
End: 2019-05-31

## 2019-05-31 ENCOUNTER — LABORATORY RESULT (OUTPATIENT)
Age: 69
End: 2019-05-31

## 2019-05-31 VITALS
TEMPERATURE: 96.4 F | RESPIRATION RATE: 16 BRPM | DIASTOLIC BLOOD PRESSURE: 74 MMHG | HEART RATE: 62 BPM | SYSTOLIC BLOOD PRESSURE: 163 MMHG

## 2019-05-31 LAB
HCT VFR BLD CALC: 30 %
HGB BLD-MCNC: 9.9 G/DL
MCHC RBC-ENTMCNC: 28.9 PG
MCHC RBC-ENTMCNC: 33 G/DL
MCV RBC AUTO: 87.5 FL
PLATELET # BLD AUTO: 206 K/UL
PMV BLD: 10.3 FL
RBC # BLD: 3.43 M/UL
RBC # FLD: 14.6 %
WBC # FLD AUTO: 8.55 K/UL

## 2019-05-31 RX ORDER — ERYTHROPOIETIN 10000 [IU]/ML
20000 INJECTION, SOLUTION INTRAVENOUS; SUBCUTANEOUS ONCE
Refills: 0 | Status: COMPLETED | OUTPATIENT
Start: 2019-05-31 | End: 2019-05-31

## 2019-05-31 RX ADMIN — ERYTHROPOIETIN 20000 UNIT(S): 10000 INJECTION, SOLUTION INTRAVENOUS; SUBCUTANEOUS at 12:00

## 2019-06-07 ENCOUNTER — APPOINTMENT (OUTPATIENT)
Dept: INFUSION THERAPY | Facility: CLINIC | Age: 69
End: 2019-06-07

## 2019-06-07 ENCOUNTER — LABORATORY RESULT (OUTPATIENT)
Age: 69
End: 2019-06-07

## 2019-06-07 VITALS
RESPIRATION RATE: 18 BRPM | SYSTOLIC BLOOD PRESSURE: 158 MMHG | HEART RATE: 59 BPM | TEMPERATURE: 96.6 F | DIASTOLIC BLOOD PRESSURE: 72 MMHG

## 2019-06-07 LAB
HCT VFR BLD CALC: 29.5 %
HGB BLD-MCNC: 9.6 G/DL
MCHC RBC-ENTMCNC: 28.9 PG
MCHC RBC-ENTMCNC: 32.5 G/DL
MCV RBC AUTO: 88.9 FL
PLATELET # BLD AUTO: 194 K/UL
PMV BLD: 10.1 FL
RBC # BLD: 3.32 M/UL
RBC # FLD: 14.8 %
WBC # FLD AUTO: 8.98 K/UL

## 2019-06-07 RX ORDER — ERYTHROPOIETIN 10000 [IU]/ML
20000 INJECTION, SOLUTION INTRAVENOUS; SUBCUTANEOUS ONCE
Refills: 0 | Status: COMPLETED | OUTPATIENT
Start: 2019-06-07 | End: 2019-06-07

## 2019-06-07 RX ADMIN — ERYTHROPOIETIN 20000 UNIT(S): 10000 INJECTION, SOLUTION INTRAVENOUS; SUBCUTANEOUS at 10:26

## 2019-06-10 ENCOUNTER — OUTPATIENT (OUTPATIENT)
Dept: OUTPATIENT SERVICES | Facility: HOSPITAL | Age: 69
LOS: 1 days | Discharge: HOME | End: 2019-06-10

## 2019-06-10 DIAGNOSIS — Z79.01 LONG TERM (CURRENT) USE OF ANTICOAGULANTS: ICD-10-CM

## 2019-06-10 DIAGNOSIS — Z98.890 OTHER SPECIFIED POSTPROCEDURAL STATES: Chronic | ICD-10-CM

## 2019-06-10 DIAGNOSIS — I48.91 UNSPECIFIED ATRIAL FIBRILLATION: ICD-10-CM

## 2019-06-10 DIAGNOSIS — Z98.41 CATARACT EXTRACTION STATUS, RIGHT EYE: Chronic | ICD-10-CM

## 2019-06-10 LAB
POCT INR: 1.9 RATIO — HIGH (ref 0.9–1.2)
POCT PT: 22.4 SEC — HIGH (ref 10–13.4)

## 2019-06-14 ENCOUNTER — LABORATORY RESULT (OUTPATIENT)
Age: 69
End: 2019-06-14

## 2019-06-14 ENCOUNTER — APPOINTMENT (OUTPATIENT)
Dept: INFUSION THERAPY | Facility: CLINIC | Age: 69
End: 2019-06-14

## 2019-06-14 LAB
HCT VFR BLD CALC: 31.2 %
HGB BLD-MCNC: 9.9 G/DL
MCHC RBC-ENTMCNC: 28.1 PG
MCHC RBC-ENTMCNC: 31.7 G/DL
MCV RBC AUTO: 88.6 FL
PLATELET # BLD AUTO: 209 K/UL
PMV BLD: 10.1 FL
RBC # BLD: 3.52 M/UL
RBC # FLD: 15.2 %
WBC # FLD AUTO: 7.8 K/UL

## 2019-06-14 RX ORDER — ERYTHROPOIETIN 10000 [IU]/ML
20000 INJECTION, SOLUTION INTRAVENOUS; SUBCUTANEOUS ONCE
Refills: 0 | Status: COMPLETED | OUTPATIENT
Start: 2019-06-14 | End: 2019-06-14

## 2019-06-14 RX ADMIN — ERYTHROPOIETIN 20000 UNIT(S): 10000 INJECTION, SOLUTION INTRAVENOUS; SUBCUTANEOUS at 12:00

## 2019-06-17 ENCOUNTER — OUTPATIENT (OUTPATIENT)
Dept: OUTPATIENT SERVICES | Facility: HOSPITAL | Age: 69
LOS: 1 days | Discharge: HOME | End: 2019-06-17

## 2019-06-17 DIAGNOSIS — Z79.01 LONG TERM (CURRENT) USE OF ANTICOAGULANTS: ICD-10-CM

## 2019-06-17 DIAGNOSIS — Z98.890 OTHER SPECIFIED POSTPROCEDURAL STATES: Chronic | ICD-10-CM

## 2019-06-17 DIAGNOSIS — I48.91 UNSPECIFIED ATRIAL FIBRILLATION: ICD-10-CM

## 2019-06-17 DIAGNOSIS — Z98.41 CATARACT EXTRACTION STATUS, RIGHT EYE: Chronic | ICD-10-CM

## 2019-06-17 LAB
POCT INR: 1.6 RATIO — HIGH (ref 0.9–1.2)
POCT PT: 19.4 SEC — HIGH (ref 10–13.4)

## 2019-06-21 ENCOUNTER — APPOINTMENT (OUTPATIENT)
Dept: INFUSION THERAPY | Facility: CLINIC | Age: 69
End: 2019-06-21

## 2019-06-21 RX ORDER — ERYTHROPOIETIN 10000 [IU]/ML
20000 INJECTION, SOLUTION INTRAVENOUS; SUBCUTANEOUS ONCE
Refills: 0 | Status: COMPLETED | OUTPATIENT
Start: 2019-06-21 | End: 2019-06-21

## 2019-06-21 RX ADMIN — ERYTHROPOIETIN 20000 UNIT(S): 10000 INJECTION, SOLUTION INTRAVENOUS; SUBCUTANEOUS at 11:00

## 2019-06-24 ENCOUNTER — OUTPATIENT (OUTPATIENT)
Dept: OUTPATIENT SERVICES | Facility: HOSPITAL | Age: 69
LOS: 1 days | Discharge: HOME | End: 2019-06-24

## 2019-06-24 DIAGNOSIS — Z79.01 LONG TERM (CURRENT) USE OF ANTICOAGULANTS: ICD-10-CM

## 2019-06-24 DIAGNOSIS — I48.91 UNSPECIFIED ATRIAL FIBRILLATION: ICD-10-CM

## 2019-06-24 DIAGNOSIS — Z98.890 OTHER SPECIFIED POSTPROCEDURAL STATES: Chronic | ICD-10-CM

## 2019-06-24 DIAGNOSIS — Z98.41 CATARACT EXTRACTION STATUS, RIGHT EYE: Chronic | ICD-10-CM

## 2019-06-24 LAB
POCT INR: 1.6 RATIO — HIGH (ref 0.9–1.2)
POCT PT: 19.3 SEC — HIGH (ref 10–13.4)

## 2019-06-28 ENCOUNTER — LABORATORY RESULT (OUTPATIENT)
Age: 69
End: 2019-06-28

## 2019-06-28 ENCOUNTER — APPOINTMENT (OUTPATIENT)
Dept: INFUSION THERAPY | Facility: CLINIC | Age: 69
End: 2019-06-28

## 2019-06-28 LAB
HCT VFR BLD CALC: 29.6 %
HGB BLD-MCNC: 9.4 G/DL
MCHC RBC-ENTMCNC: 28.7 PG
MCHC RBC-ENTMCNC: 31.8 G/DL
MCV RBC AUTO: 90.2 FL
PLATELET # BLD AUTO: 186 K/UL
PMV BLD: 10.5 FL
RBC # BLD: 3.28 M/UL
RBC # FLD: 15.5 %
WBC # FLD AUTO: 7.16 K/UL

## 2019-06-28 RX ORDER — ERYTHROPOIETIN 10000 [IU]/ML
20000 INJECTION, SOLUTION INTRAVENOUS; SUBCUTANEOUS ONCE
Refills: 0 | Status: COMPLETED | OUTPATIENT
Start: 2019-06-28 | End: 2019-06-28

## 2019-06-28 RX ADMIN — ERYTHROPOIETIN 20000 UNIT(S): 10000 INJECTION, SOLUTION INTRAVENOUS; SUBCUTANEOUS at 11:00

## 2019-07-01 ENCOUNTER — OUTPATIENT (OUTPATIENT)
Dept: OUTPATIENT SERVICES | Facility: HOSPITAL | Age: 69
LOS: 1 days | Discharge: HOME | End: 2019-07-01

## 2019-07-01 DIAGNOSIS — Z98.890 OTHER SPECIFIED POSTPROCEDURAL STATES: Chronic | ICD-10-CM

## 2019-07-01 DIAGNOSIS — Z98.41 CATARACT EXTRACTION STATUS, RIGHT EYE: Chronic | ICD-10-CM

## 2019-07-01 DIAGNOSIS — Z79.01 LONG TERM (CURRENT) USE OF ANTICOAGULANTS: ICD-10-CM

## 2019-07-01 DIAGNOSIS — I48.91 UNSPECIFIED ATRIAL FIBRILLATION: ICD-10-CM

## 2019-07-01 LAB
POCT INR: 2 RATIO — HIGH (ref 0.9–1.2)
POCT PT: 23.6 SEC — HIGH (ref 10–13.4)

## 2019-07-05 ENCOUNTER — RECORD ABSTRACTING (OUTPATIENT)
Age: 69
End: 2019-07-05

## 2019-07-05 ENCOUNTER — APPOINTMENT (OUTPATIENT)
Dept: INFUSION THERAPY | Facility: CLINIC | Age: 69
End: 2019-07-05

## 2019-07-05 DIAGNOSIS — Z87.898 PERSONAL HISTORY OF OTHER SPECIFIED CONDITIONS: ICD-10-CM

## 2019-07-05 DIAGNOSIS — L51.1 STEVENS-JOHNSON SYNDROME: ICD-10-CM

## 2019-07-05 DIAGNOSIS — Z86.39 PERSONAL HISTORY OF OTHER ENDOCRINE, NUTRITIONAL AND METABOLIC DISEASE: ICD-10-CM

## 2019-07-05 DIAGNOSIS — Z86.79 PERSONAL HISTORY OF OTHER DISEASES OF THE CIRCULATORY SYSTEM: ICD-10-CM

## 2019-07-05 DIAGNOSIS — Z87.19 PERSONAL HISTORY OF OTHER DISEASES OF THE DIGESTIVE SYSTEM: ICD-10-CM

## 2019-07-05 RX ORDER — ERYTHROPOIETIN 10000 [IU]/ML
20000 INJECTION, SOLUTION INTRAVENOUS; SUBCUTANEOUS ONCE
Refills: 0 | Status: COMPLETED | OUTPATIENT
Start: 2019-07-05 | End: 2019-07-05

## 2019-07-05 RX ORDER — ERGOCALCIFEROL (VITAMIN D2) 1250 MCG
50000 CAPSULE ORAL
Refills: 0 | Status: ACTIVE | COMMUNITY

## 2019-07-05 RX ORDER — METOPROLOL TARTRATE 50 MG/1
50 TABLET, FILM COATED ORAL DAILY
Refills: 0 | Status: ACTIVE | COMMUNITY

## 2019-07-05 RX ORDER — INSULIN GLARGINE 100 [IU]/ML
100 INJECTION, SOLUTION SUBCUTANEOUS
Refills: 0 | Status: ACTIVE | COMMUNITY

## 2019-07-05 RX ORDER — BUMETANIDE 2 MG/1
TABLET ORAL
Refills: 0 | Status: ACTIVE | COMMUNITY

## 2019-07-05 RX ORDER — AMLODIPINE BESYLATE 10 MG/1
10 TABLET ORAL DAILY
Refills: 0 | Status: ACTIVE | COMMUNITY

## 2019-07-05 RX ADMIN — ERYTHROPOIETIN 20000 UNIT(S): 10000 INJECTION, SOLUTION INTRAVENOUS; SUBCUTANEOUS at 11:11

## 2019-07-08 ENCOUNTER — OUTPATIENT (OUTPATIENT)
Dept: OUTPATIENT SERVICES | Facility: HOSPITAL | Age: 69
LOS: 1 days | Discharge: HOME | End: 2019-07-08

## 2019-07-08 DIAGNOSIS — Z98.41 CATARACT EXTRACTION STATUS, RIGHT EYE: Chronic | ICD-10-CM

## 2019-07-08 DIAGNOSIS — I48.91 UNSPECIFIED ATRIAL FIBRILLATION: ICD-10-CM

## 2019-07-08 DIAGNOSIS — Z98.890 OTHER SPECIFIED POSTPROCEDURAL STATES: Chronic | ICD-10-CM

## 2019-07-08 DIAGNOSIS — Z79.01 LONG TERM (CURRENT) USE OF ANTICOAGULANTS: ICD-10-CM

## 2019-07-08 LAB
POCT INR: 2.8 RATIO — HIGH (ref 0.9–1.2)
POCT PT: 33.9 SEC — HIGH (ref 10–13.4)

## 2019-07-11 ENCOUNTER — OUTPATIENT (OUTPATIENT)
Dept: OUTPATIENT SERVICES | Facility: HOSPITAL | Age: 69
LOS: 1 days | Discharge: HOME | End: 2019-07-11

## 2019-07-11 DIAGNOSIS — Z98.890 OTHER SPECIFIED POSTPROCEDURAL STATES: Chronic | ICD-10-CM

## 2019-07-11 DIAGNOSIS — Z98.41 CATARACT EXTRACTION STATUS, RIGHT EYE: Chronic | ICD-10-CM

## 2019-07-12 ENCOUNTER — LABORATORY RESULT (OUTPATIENT)
Age: 69
End: 2019-07-12

## 2019-07-12 ENCOUNTER — APPOINTMENT (OUTPATIENT)
Dept: INFUSION THERAPY | Facility: CLINIC | Age: 69
End: 2019-07-12

## 2019-07-12 VITALS
HEART RATE: 62 BPM | TEMPERATURE: 96.5 F | DIASTOLIC BLOOD PRESSURE: 69 MMHG | RESPIRATION RATE: 18 BRPM | SYSTOLIC BLOOD PRESSURE: 130 MMHG

## 2019-07-12 DIAGNOSIS — I10 ESSENTIAL (PRIMARY) HYPERTENSION: ICD-10-CM

## 2019-07-12 DIAGNOSIS — N18.3 CHRONIC KIDNEY DISEASE, STAGE 3 (MODERATE): ICD-10-CM

## 2019-07-12 DIAGNOSIS — E11.9 TYPE 2 DIABETES MELLITUS WITHOUT COMPLICATIONS: ICD-10-CM

## 2019-07-12 LAB
HCT VFR BLD CALC: 34 %
HGB BLD-MCNC: 11 G/DL
MCHC RBC-ENTMCNC: 28.1 PG
MCHC RBC-ENTMCNC: 32.4 G/DL
MCV RBC AUTO: 87 FL
PLATELET # BLD AUTO: 235 K/UL
PMV BLD: 10.5 FL
RBC # BLD: 3.91 M/UL
RBC # FLD: 14.8 %
WBC # FLD AUTO: 8.65 K/UL

## 2019-07-15 ENCOUNTER — OUTPATIENT (OUTPATIENT)
Dept: OUTPATIENT SERVICES | Facility: HOSPITAL | Age: 69
LOS: 1 days | Discharge: HOME | End: 2019-07-15

## 2019-07-15 DIAGNOSIS — I48.91 UNSPECIFIED ATRIAL FIBRILLATION: ICD-10-CM

## 2019-07-15 DIAGNOSIS — Z98.890 OTHER SPECIFIED POSTPROCEDURAL STATES: Chronic | ICD-10-CM

## 2019-07-15 DIAGNOSIS — Z98.41 CATARACT EXTRACTION STATUS, RIGHT EYE: Chronic | ICD-10-CM

## 2019-07-15 DIAGNOSIS — Z79.01 LONG TERM (CURRENT) USE OF ANTICOAGULANTS: ICD-10-CM

## 2019-07-15 LAB
POCT INR: 3 RATIO — HIGH (ref 0.9–1.2)
POCT PT: 36.3 SEC — HIGH (ref 10–13.4)

## 2019-07-19 ENCOUNTER — APPOINTMENT (OUTPATIENT)
Dept: INFUSION THERAPY | Facility: CLINIC | Age: 69
End: 2019-07-19

## 2019-07-22 ENCOUNTER — OUTPATIENT (OUTPATIENT)
Dept: OUTPATIENT SERVICES | Facility: HOSPITAL | Age: 69
LOS: 1 days | Discharge: HOME | End: 2019-07-22

## 2019-07-22 DIAGNOSIS — Z98.890 OTHER SPECIFIED POSTPROCEDURAL STATES: Chronic | ICD-10-CM

## 2019-07-22 DIAGNOSIS — Z79.01 LONG TERM (CURRENT) USE OF ANTICOAGULANTS: ICD-10-CM

## 2019-07-22 DIAGNOSIS — I48.91 UNSPECIFIED ATRIAL FIBRILLATION: ICD-10-CM

## 2019-07-22 DIAGNOSIS — Z98.41 CATARACT EXTRACTION STATUS, RIGHT EYE: Chronic | ICD-10-CM

## 2019-07-22 LAB
POCT INR: 2.6 RATIO — HIGH (ref 0.9–1.2)
POCT PT: 31.1 SEC — HIGH (ref 10–13.4)

## 2019-07-26 ENCOUNTER — LABORATORY RESULT (OUTPATIENT)
Age: 69
End: 2019-07-26

## 2019-07-26 ENCOUNTER — APPOINTMENT (OUTPATIENT)
Dept: INFUSION THERAPY | Facility: CLINIC | Age: 69
End: 2019-07-26

## 2019-07-26 LAB
HCT VFR BLD CALC: 31.5 %
HGB BLD-MCNC: 10.2 G/DL
MCHC RBC-ENTMCNC: 28.2 PG
MCHC RBC-ENTMCNC: 32.4 G/DL
MCV RBC AUTO: 87 FL
PLATELET # BLD AUTO: 186 K/UL
PMV BLD: 11 FL
RBC # BLD: 3.62 M/UL
RBC # FLD: 14.6 %
WBC # FLD AUTO: 7.87 K/UL

## 2019-08-01 ENCOUNTER — APPOINTMENT (OUTPATIENT)
Dept: INFUSION THERAPY | Facility: CLINIC | Age: 69
End: 2019-08-01

## 2019-08-01 ENCOUNTER — APPOINTMENT (OUTPATIENT)
Dept: HEMATOLOGY ONCOLOGY | Facility: CLINIC | Age: 69
End: 2019-08-01

## 2019-08-05 ENCOUNTER — OUTPATIENT (OUTPATIENT)
Dept: OUTPATIENT SERVICES | Facility: HOSPITAL | Age: 69
LOS: 1 days | Discharge: HOME | End: 2019-08-05

## 2019-08-05 DIAGNOSIS — Z98.41 CATARACT EXTRACTION STATUS, RIGHT EYE: Chronic | ICD-10-CM

## 2019-08-05 DIAGNOSIS — I48.91 UNSPECIFIED ATRIAL FIBRILLATION: ICD-10-CM

## 2019-08-05 DIAGNOSIS — Z98.890 OTHER SPECIFIED POSTPROCEDURAL STATES: Chronic | ICD-10-CM

## 2019-08-05 DIAGNOSIS — Z79.01 LONG TERM (CURRENT) USE OF ANTICOAGULANTS: ICD-10-CM

## 2019-08-05 LAB
POCT INR: 3.4 RATIO — HIGH (ref 0.9–1.2)
POCT PT: 40.8 SEC — HIGH (ref 10–13.4)

## 2019-08-09 ENCOUNTER — LABORATORY RESULT (OUTPATIENT)
Age: 69
End: 2019-08-09

## 2019-08-09 ENCOUNTER — APPOINTMENT (OUTPATIENT)
Dept: HEMATOLOGY ONCOLOGY | Facility: CLINIC | Age: 69
End: 2019-08-09
Payer: MEDICARE

## 2019-08-09 ENCOUNTER — APPOINTMENT (OUTPATIENT)
Dept: INFUSION THERAPY | Facility: CLINIC | Age: 69
End: 2019-08-09
Payer: MEDICARE

## 2019-08-09 VITALS
BODY MASS INDEX: 39.24 KG/M2 | HEART RATE: 66 BPM | SYSTOLIC BLOOD PRESSURE: 163 MMHG | HEIGHT: 67 IN | WEIGHT: 250 LBS | TEMPERATURE: 96.6 F | DIASTOLIC BLOOD PRESSURE: 73 MMHG

## 2019-08-09 DIAGNOSIS — G47.33 OBSTRUCTIVE SLEEP APNEA (ADULT) (PEDIATRIC): ICD-10-CM

## 2019-08-09 PROCEDURE — 99213 OFFICE O/P EST LOW 20 MIN: CPT

## 2019-08-09 RX ORDER — ERYTHROPOIETIN 10000 [IU]/ML
20000 INJECTION, SOLUTION INTRAVENOUS; SUBCUTANEOUS ONCE
Refills: 0 | Status: COMPLETED | OUTPATIENT
Start: 2019-08-09 | End: 2019-08-09

## 2019-08-09 RX ADMIN — ERYTHROPOIETIN 20000 UNIT(S): 10000 INJECTION, SOLUTION INTRAVENOUS; SUBCUTANEOUS at 11:36

## 2019-08-09 NOTE — CONSULT LETTER
[Dear  ___] : Dear  [unfilled], [Courtesy Letter:] : I had the pleasure of seeing your patient, [unfilled], in my office today. [Please see my note below.] : Please see my note below. [Consult Closing:] : Thank you very much for allowing me to participate in the care of this patient.  If you have any questions, please do not hesitate to contact me. [Sincerely,] : Sincerely, [FreeTextEntry2] : Dr. M. Kleiner [FreeTextEntry3] : Dr. JAKOB Hooper
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
## 2019-08-09 NOTE — PHYSICAL EXAM
[Fully active, able to carry on all pre-disease performance without restriction] : Status 0 - Fully active, able to carry on all pre-disease performance without restriction [Obese] : obese [de-identified] : ASrthritic changes [Normal] : affect appropriate

## 2019-08-09 NOTE — ASSESSMENT
[FreeTextEntry1] : Anemia of chronic kidney disease responding to Procrit. Has not had any injection for 3 weeks now. \par Will repeat CBC.\par Will keep the patient on Procrit if Hgb drops to below 10. The patient may also be placed back on iron replacement since he had stopped taking it.\par \par All questions answered.\par \par The patient does not need to see us more frequently than every 6 months but the CBC will be monitored almost weekly and Procrit administered accordingly.\par \par

## 2019-08-09 NOTE — REVIEW OF SYSTEMS
[Vision Problems] : vision problems [Joint Stiffness] : joint stiffness [Joint Pain] : joint pain [Insomnia] : insomnia [Easy Bruising] : a tendency for easy bruising [Negative] : Integumentary [FreeTextEntry3] : Was blind in one eye (now resolved) and S/P cataract surgery. [FreeTextEntry9] : Has gout

## 2019-08-12 ENCOUNTER — OUTPATIENT (OUTPATIENT)
Dept: OUTPATIENT SERVICES | Facility: HOSPITAL | Age: 69
LOS: 1 days | Discharge: HOME | End: 2019-08-12

## 2019-08-12 DIAGNOSIS — Z98.41 CATARACT EXTRACTION STATUS, RIGHT EYE: Chronic | ICD-10-CM

## 2019-08-12 DIAGNOSIS — I48.91 UNSPECIFIED ATRIAL FIBRILLATION: ICD-10-CM

## 2019-08-12 DIAGNOSIS — Z79.01 LONG TERM (CURRENT) USE OF ANTICOAGULANTS: ICD-10-CM

## 2019-08-12 DIAGNOSIS — Z98.890 OTHER SPECIFIED POSTPROCEDURAL STATES: Chronic | ICD-10-CM

## 2019-08-12 LAB
POCT INR: 1.9 RATIO — HIGH (ref 0.9–1.2)
POCT PT: 23.1 SEC — HIGH (ref 10–13.4)

## 2019-08-16 ENCOUNTER — APPOINTMENT (OUTPATIENT)
Dept: INFUSION THERAPY | Facility: CLINIC | Age: 69
End: 2019-08-16

## 2019-08-16 ENCOUNTER — LABORATORY RESULT (OUTPATIENT)
Age: 69
End: 2019-08-16

## 2019-08-16 RX ORDER — ERYTHROPOIETIN 10000 [IU]/ML
20000 INJECTION, SOLUTION INTRAVENOUS; SUBCUTANEOUS ONCE
Refills: 0 | Status: COMPLETED | OUTPATIENT
Start: 2019-08-16 | End: 2019-08-16

## 2019-08-16 RX ADMIN — ERYTHROPOIETIN 20000 UNIT(S): 10000 INJECTION, SOLUTION INTRAVENOUS; SUBCUTANEOUS at 10:47

## 2019-08-17 ENCOUNTER — APPOINTMENT (OUTPATIENT)
Dept: ENDOCRINOLOGY | Facility: CLINIC | Age: 69
End: 2019-08-17
Payer: MEDICARE

## 2019-08-17 VITALS
BODY MASS INDEX: 37.03 KG/M2 | HEIGHT: 69 IN | DIASTOLIC BLOOD PRESSURE: 76 MMHG | SYSTOLIC BLOOD PRESSURE: 154 MMHG | HEART RATE: 70 BPM | WEIGHT: 250 LBS

## 2019-08-17 PROCEDURE — 99214 OFFICE O/P EST MOD 30 MIN: CPT

## 2019-08-17 NOTE — ASSESSMENT
[FreeTextEntry1] :  Pt. was seen and examined and we had long discussion regarding diabetic complication risks (eye exam, examining feet). Labs were fully reviewed with pt. including FBS, HbA1c, and micro albumin and lipids. Appropriate recommendation for eye and foot care were reviewed. Pt has GFR 12 and followed by renal for CKD.\par Glycemic values are adequate and no new symptoms of concern (discussed neuropathy symptoms, C/p, cardiac issues and GI). To stay with current regimen. \par Reduce to 15 lantus BID

## 2019-08-17 NOTE — PHYSICAL EXAM
[Alert] : alert [No Acute Distress] : no acute distress [Well Developed] : well developed [Well Nourished] : well nourished [Normal Sclera/Conjunctiva] : normal sclera/conjunctiva [EOMI] : extra ocular movement intact [No Proptosis] : no proptosis [Thyroid Not Enlarged] : the thyroid was not enlarged [Normal Oropharynx] : the oropharynx was normal [No Thyroid Nodules] : there were no palpable thyroid nodules [No Respiratory Distress] : no respiratory distress [No Accessory Muscle Use] : no accessory muscle use [Clear to Auscultation] : lungs were clear to auscultation bilaterally [Normal S1, S2] : normal S1 and S2 [Normal Rate] : heart rate was normal  [Regular Rhythm] : with a regular rhythm [No Edema] : there was no peripheral edema [Pedal Pulses Normal] : the pedal pulses are present [Not Tender] : non-tender [Normal Bowel Sounds] : normal bowel sounds [Not Distended] : not distended [Soft] : abdomen soft [Post Cervical Nodes] : posterior cervical nodes [Anterior Cervical Nodes] : anterior cervical nodes [Axillary Nodes] : axillary nodes [Normal] : normal and non tender [No Spinal Tenderness] : no spinal tenderness [Spine Straight] : spine straight [Normal Gait] : normal gait [No Stigmata of Cushings Syndrome] : no stigmata of cushings syndrome [Normal Strength/Tone] : muscle strength and tone were normal [No Rash] : no rash [Normal Reflexes] : deep tendon reflexes were 2+ and symmetric [No Tremors] : no tremors [Oriented x3] : oriented to person, place, and time [Acanthosis Nigricans] : no acanthosis nigricans

## 2019-08-19 ENCOUNTER — OUTPATIENT (OUTPATIENT)
Dept: OUTPATIENT SERVICES | Facility: HOSPITAL | Age: 69
LOS: 1 days | Discharge: HOME | End: 2019-08-19

## 2019-08-19 DIAGNOSIS — Z98.890 OTHER SPECIFIED POSTPROCEDURAL STATES: Chronic | ICD-10-CM

## 2019-08-19 DIAGNOSIS — Z98.41 CATARACT EXTRACTION STATUS, RIGHT EYE: Chronic | ICD-10-CM

## 2019-08-19 DIAGNOSIS — I48.91 UNSPECIFIED ATRIAL FIBRILLATION: ICD-10-CM

## 2019-08-19 DIAGNOSIS — Z79.01 LONG TERM (CURRENT) USE OF ANTICOAGULANTS: ICD-10-CM

## 2019-08-19 LAB
POCT INR: 1.8 RATIO — HIGH (ref 0.9–1.2)
POCT PT: 21.3 SEC — HIGH (ref 10–13.4)

## 2019-08-20 LAB
HCT VFR BLD CALC: 26.3 %
HGB BLD-MCNC: 8.6 G/DL
MCHC RBC-ENTMCNC: 29.1 PG
MCHC RBC-ENTMCNC: 32.7 G/DL
MCV RBC AUTO: 88.9 FL
PLATELET # BLD AUTO: 153 K/UL
PMV BLD: 10.5 FL
RBC # BLD: 2.96 M/UL
RBC # FLD: 16.5 %
WBC # FLD AUTO: 7.02 K/UL

## 2019-08-23 ENCOUNTER — APPOINTMENT (OUTPATIENT)
Dept: INFUSION THERAPY | Facility: CLINIC | Age: 69
End: 2019-08-23

## 2019-08-23 RX ORDER — ERYTHROPOIETIN 10000 [IU]/ML
20000 INJECTION, SOLUTION INTRAVENOUS; SUBCUTANEOUS ONCE
Refills: 0 | Status: COMPLETED | OUTPATIENT
Start: 2019-08-23 | End: 2019-08-23

## 2019-08-23 RX ADMIN — ERYTHROPOIETIN 20000 UNIT(S): 10000 INJECTION, SOLUTION INTRAVENOUS; SUBCUTANEOUS at 10:40

## 2019-08-26 ENCOUNTER — OUTPATIENT (OUTPATIENT)
Dept: OUTPATIENT SERVICES | Facility: HOSPITAL | Age: 69
LOS: 1 days | Discharge: HOME | End: 2019-08-26

## 2019-08-26 DIAGNOSIS — I48.91 UNSPECIFIED ATRIAL FIBRILLATION: ICD-10-CM

## 2019-08-26 DIAGNOSIS — Z98.41 CATARACT EXTRACTION STATUS, RIGHT EYE: Chronic | ICD-10-CM

## 2019-08-26 DIAGNOSIS — Z98.890 OTHER SPECIFIED POSTPROCEDURAL STATES: Chronic | ICD-10-CM

## 2019-08-26 DIAGNOSIS — Z79.01 LONG TERM (CURRENT) USE OF ANTICOAGULANTS: ICD-10-CM

## 2019-08-26 LAB
POCT INR: 2.3 RATIO — HIGH (ref 0.9–1.2)
POCT PT: 27.2 SEC — HIGH (ref 10–13.4)

## 2019-08-30 ENCOUNTER — LABORATORY RESULT (OUTPATIENT)
Age: 69
End: 2019-08-30

## 2019-08-30 ENCOUNTER — APPOINTMENT (OUTPATIENT)
Dept: INFUSION THERAPY | Facility: CLINIC | Age: 69
End: 2019-08-30

## 2019-08-30 DIAGNOSIS — Z00.00 ENCOUNTER FOR GENERAL ADULT MEDICAL EXAMINATION W/OUT ABNORMAL FINDINGS: ICD-10-CM

## 2019-08-30 LAB
HCT VFR BLD CALC: 30.1 %
HGB BLD-MCNC: 9.7 G/DL
MCHC RBC-ENTMCNC: 29.6 PG
MCHC RBC-ENTMCNC: 32.2 G/DL
MCV RBC AUTO: 91.8 FL
PLATELET # BLD AUTO: 193 K/UL
PMV BLD: 10.2 FL
RBC # BLD: 3.28 M/UL
RBC # FLD: 17.5 %
WBC # FLD AUTO: 7.81 K/UL

## 2019-08-30 RX ORDER — ERYTHROPOIETIN 10000 [IU]/ML
20000 INJECTION, SOLUTION INTRAVENOUS; SUBCUTANEOUS ONCE
Refills: 0 | Status: COMPLETED | OUTPATIENT
Start: 2019-08-30 | End: 2019-08-30

## 2019-08-30 RX ADMIN — ERYTHROPOIETIN 20000 UNIT(S): 10000 INJECTION, SOLUTION INTRAVENOUS; SUBCUTANEOUS at 11:03

## 2019-09-06 ENCOUNTER — OUTPATIENT (OUTPATIENT)
Dept: OUTPATIENT SERVICES | Facility: HOSPITAL | Age: 69
LOS: 1 days | Discharge: HOME | End: 2019-09-06

## 2019-09-06 ENCOUNTER — APPOINTMENT (OUTPATIENT)
Dept: INFUSION THERAPY | Facility: CLINIC | Age: 69
End: 2019-09-06

## 2019-09-06 DIAGNOSIS — D64.9 ANEMIA, UNSPECIFIED: ICD-10-CM

## 2019-09-06 DIAGNOSIS — N28.9 DISORDER OF KIDNEY AND URETER, UNSPECIFIED: ICD-10-CM

## 2019-09-06 DIAGNOSIS — Z98.41 CATARACT EXTRACTION STATUS, RIGHT EYE: Chronic | ICD-10-CM

## 2019-09-06 DIAGNOSIS — Z98.890 OTHER SPECIFIED POSTPROCEDURAL STATES: Chronic | ICD-10-CM

## 2019-09-06 RX ORDER — ERYTHROPOIETIN 10000 [IU]/ML
30000 INJECTION, SOLUTION INTRAVENOUS; SUBCUTANEOUS ONCE
Refills: 0 | Status: COMPLETED | OUTPATIENT
Start: 2019-09-06 | End: 2019-09-06

## 2019-09-06 RX ADMIN — ERYTHROPOIETIN 30000 UNIT(S): 10000 INJECTION, SOLUTION INTRAVENOUS; SUBCUTANEOUS at 10:52

## 2019-09-09 ENCOUNTER — OUTPATIENT (OUTPATIENT)
Dept: OUTPATIENT SERVICES | Facility: HOSPITAL | Age: 69
LOS: 1 days | Discharge: HOME | End: 2019-09-09

## 2019-09-09 DIAGNOSIS — Z98.890 OTHER SPECIFIED POSTPROCEDURAL STATES: Chronic | ICD-10-CM

## 2019-09-09 DIAGNOSIS — Z79.01 LONG TERM (CURRENT) USE OF ANTICOAGULANTS: ICD-10-CM

## 2019-09-09 DIAGNOSIS — I48.91 UNSPECIFIED ATRIAL FIBRILLATION: ICD-10-CM

## 2019-09-09 DIAGNOSIS — Z98.41 CATARACT EXTRACTION STATUS, RIGHT EYE: Chronic | ICD-10-CM

## 2019-09-09 LAB
POCT INR: 2.2 RATIO — HIGH (ref 0.9–1.2)
POCT PT: 26.4 SEC — HIGH (ref 10–13.4)

## 2019-09-13 ENCOUNTER — APPOINTMENT (OUTPATIENT)
Dept: INFUSION THERAPY | Facility: CLINIC | Age: 69
End: 2019-09-13

## 2019-09-13 ENCOUNTER — LABORATORY RESULT (OUTPATIENT)
Age: 69
End: 2019-09-13

## 2019-09-13 LAB
HCT VFR BLD CALC: 32.4 %
HGB BLD-MCNC: 10.5 G/DL
MCHC RBC-ENTMCNC: 29.8 PG
MCHC RBC-ENTMCNC: 32.4 G/DL
MCV RBC AUTO: 92 FL
PLATELET # BLD AUTO: 185 K/UL
PMV BLD: 9.9 FL
RBC # BLD: 3.52 M/UL
RBC # FLD: 16.6 %
WBC # FLD AUTO: 7.77 K/UL

## 2019-09-13 RX ORDER — ERYTHROPOIETIN 10000 [IU]/ML
30000 INJECTION, SOLUTION INTRAVENOUS; SUBCUTANEOUS ONCE
Refills: 0 | Status: COMPLETED | OUTPATIENT
Start: 2019-09-13 | End: 2019-09-13

## 2019-09-13 RX ADMIN — ERYTHROPOIETIN 30000 UNIT(S): 10000 INJECTION, SOLUTION INTRAVENOUS; SUBCUTANEOUS at 10:56

## 2019-09-20 ENCOUNTER — LABORATORY RESULT (OUTPATIENT)
Age: 69
End: 2019-09-20

## 2019-09-20 ENCOUNTER — APPOINTMENT (OUTPATIENT)
Dept: INFUSION THERAPY | Facility: CLINIC | Age: 69
End: 2019-09-20

## 2019-09-27 ENCOUNTER — LABORATORY RESULT (OUTPATIENT)
Age: 69
End: 2019-09-27

## 2019-09-27 ENCOUNTER — APPOINTMENT (OUTPATIENT)
Dept: INFUSION THERAPY | Facility: CLINIC | Age: 69
End: 2019-09-27

## 2019-09-27 RX ORDER — ERYTHROPOIETIN 10000 [IU]/ML
30000 INJECTION, SOLUTION INTRAVENOUS; SUBCUTANEOUS ONCE
Refills: 0 | Status: COMPLETED | OUTPATIENT
Start: 2019-09-27 | End: 2019-09-27

## 2019-09-27 RX ADMIN — ERYTHROPOIETIN 30000 UNIT(S): 10000 INJECTION, SOLUTION INTRAVENOUS; SUBCUTANEOUS at 10:37

## 2019-09-30 ENCOUNTER — OUTPATIENT (OUTPATIENT)
Dept: OUTPATIENT SERVICES | Facility: HOSPITAL | Age: 69
LOS: 1 days | Discharge: HOME | End: 2019-09-30

## 2019-09-30 DIAGNOSIS — I48.91 UNSPECIFIED ATRIAL FIBRILLATION: ICD-10-CM

## 2019-09-30 DIAGNOSIS — Z98.41 CATARACT EXTRACTION STATUS, RIGHT EYE: Chronic | ICD-10-CM

## 2019-09-30 DIAGNOSIS — Z79.01 LONG TERM (CURRENT) USE OF ANTICOAGULANTS: ICD-10-CM

## 2019-09-30 DIAGNOSIS — Z98.890 OTHER SPECIFIED POSTPROCEDURAL STATES: Chronic | ICD-10-CM

## 2019-09-30 LAB
POCT INR: 3.4 RATIO — HIGH (ref 0.9–1.2)
POCT PT: 40.7 SEC — HIGH (ref 10–13.4)

## 2019-10-02 ENCOUNTER — LABORATORY RESULT (OUTPATIENT)
Age: 69
End: 2019-10-02

## 2019-10-02 ENCOUNTER — APPOINTMENT (OUTPATIENT)
Dept: INFUSION THERAPY | Facility: CLINIC | Age: 69
End: 2019-10-02

## 2019-10-03 LAB
HCT VFR BLD CALC: 30.7 %
HCT VFR BLD CALC: 32.7 %
HCT VFR BLD CALC: 35.4 %
HGB BLD-MCNC: 10 G/DL
HGB BLD-MCNC: 10.7 G/DL
HGB BLD-MCNC: 11.6 G/DL
MCHC RBC-ENTMCNC: 29.5 PG
MCHC RBC-ENTMCNC: 29.6 PG
MCHC RBC-ENTMCNC: 29.7 PG
MCHC RBC-ENTMCNC: 32.6 G/DL
MCHC RBC-ENTMCNC: 32.7 G/DL
MCHC RBC-ENTMCNC: 32.8 G/DL
MCV RBC AUTO: 90.1 FL
MCV RBC AUTO: 90.8 FL
MCV RBC AUTO: 90.8 FL
PLATELET # BLD AUTO: 168 K/UL
PLATELET # BLD AUTO: 184 K/UL
PLATELET # BLD AUTO: 217 K/UL
PMV BLD: 10 FL
PMV BLD: 10.1 FL
PMV BLD: 10.5 FL
RBC # BLD: 3.38 M/UL
RBC # BLD: 3.6 M/UL
RBC # BLD: 3.93 M/UL
RBC # FLD: 14.6 %
RBC # FLD: 14.8 %
RBC # FLD: 15.7 %
WBC # FLD AUTO: 7.05 K/UL
WBC # FLD AUTO: 7.94 K/UL
WBC # FLD AUTO: 9.11 K/UL

## 2019-11-08 ENCOUNTER — APPOINTMENT (OUTPATIENT)
Dept: INFUSION THERAPY | Facility: CLINIC | Age: 69
End: 2019-11-08

## 2019-11-15 ENCOUNTER — APPOINTMENT (OUTPATIENT)
Dept: INFUSION THERAPY | Facility: CLINIC | Age: 69
End: 2019-11-15

## 2019-11-22 ENCOUNTER — APPOINTMENT (OUTPATIENT)
Dept: INFUSION THERAPY | Facility: CLINIC | Age: 69
End: 2019-11-22

## 2019-11-22 ENCOUNTER — LABORATORY RESULT (OUTPATIENT)
Age: 69
End: 2019-11-22

## 2019-11-22 LAB
HCT VFR BLD CALC: 27.1 %
HGB BLD-MCNC: 8.9 G/DL
MCHC RBC-ENTMCNC: 29.4 PG
MCHC RBC-ENTMCNC: 32.8 G/DL
MCV RBC AUTO: 89.4 FL
PLATELET # BLD AUTO: 161 K/UL
PMV BLD: 10.3 FL
RBC # BLD: 3.03 M/UL
RBC # FLD: 14.6 %
WBC # FLD AUTO: 6.09 K/UL

## 2019-11-22 RX ORDER — ERYTHROPOIETIN 10000 [IU]/ML
30000 INJECTION, SOLUTION INTRAVENOUS; SUBCUTANEOUS ONCE
Refills: 0 | Status: COMPLETED | OUTPATIENT
Start: 2019-11-22 | End: 2019-11-22

## 2019-11-22 RX ADMIN — ERYTHROPOIETIN 30000 UNIT(S): 10000 INJECTION, SOLUTION INTRAVENOUS; SUBCUTANEOUS at 11:51

## 2019-11-25 ENCOUNTER — OUTPATIENT (OUTPATIENT)
Dept: OUTPATIENT SERVICES | Facility: HOSPITAL | Age: 69
LOS: 1 days | Discharge: HOME | End: 2019-11-25

## 2019-11-25 DIAGNOSIS — I48.91 UNSPECIFIED ATRIAL FIBRILLATION: ICD-10-CM

## 2019-11-25 DIAGNOSIS — Z98.41 CATARACT EXTRACTION STATUS, RIGHT EYE: Chronic | ICD-10-CM

## 2019-11-25 DIAGNOSIS — Z98.890 OTHER SPECIFIED POSTPROCEDURAL STATES: Chronic | ICD-10-CM

## 2019-11-25 DIAGNOSIS — Z79.01 LONG TERM (CURRENT) USE OF ANTICOAGULANTS: ICD-10-CM

## 2019-11-29 ENCOUNTER — APPOINTMENT (OUTPATIENT)
Dept: INFUSION THERAPY | Facility: CLINIC | Age: 69
End: 2019-11-29

## 2019-11-29 ENCOUNTER — LABORATORY RESULT (OUTPATIENT)
Age: 69
End: 2019-11-29

## 2019-11-29 LAB
HCT VFR BLD CALC: 29.1 %
HGB BLD-MCNC: 9.3 G/DL
MCHC RBC-ENTMCNC: 29.7 PG
MCHC RBC-ENTMCNC: 32 G/DL
MCV RBC AUTO: 93 FL
PLATELET # BLD AUTO: 154 K/UL
PMV BLD: 10 FL
RBC # BLD: 3.13 M/UL
RBC # FLD: 15.9 %
WBC # FLD AUTO: 7.61 K/UL

## 2019-11-29 RX ORDER — ERYTHROPOIETIN 10000 [IU]/ML
30000 INJECTION, SOLUTION INTRAVENOUS; SUBCUTANEOUS ONCE
Refills: 0 | Status: COMPLETED | OUTPATIENT
Start: 2019-11-29 | End: 2019-11-29

## 2019-11-29 RX ADMIN — ERYTHROPOIETIN 30000 UNIT(S): 10000 INJECTION, SOLUTION INTRAVENOUS; SUBCUTANEOUS at 11:06

## 2019-12-02 ENCOUNTER — OUTPATIENT (OUTPATIENT)
Dept: OUTPATIENT SERVICES | Facility: HOSPITAL | Age: 69
LOS: 1 days | Discharge: HOME | End: 2019-12-02

## 2019-12-02 DIAGNOSIS — Z79.01 LONG TERM (CURRENT) USE OF ANTICOAGULANTS: ICD-10-CM

## 2019-12-02 DIAGNOSIS — Z98.41 CATARACT EXTRACTION STATUS, RIGHT EYE: Chronic | ICD-10-CM

## 2019-12-02 DIAGNOSIS — Z98.890 OTHER SPECIFIED POSTPROCEDURAL STATES: Chronic | ICD-10-CM

## 2019-12-02 DIAGNOSIS — I48.91 UNSPECIFIED ATRIAL FIBRILLATION: ICD-10-CM

## 2019-12-02 LAB
POCT INR: 2.8 RATIO — HIGH (ref 0.9–1.2)
POCT PT: 33.4 SEC — HIGH (ref 10–13.4)

## 2019-12-06 ENCOUNTER — LABORATORY RESULT (OUTPATIENT)
Age: 69
End: 2019-12-06

## 2019-12-06 ENCOUNTER — APPOINTMENT (OUTPATIENT)
Dept: INFUSION THERAPY | Facility: CLINIC | Age: 69
End: 2019-12-06

## 2019-12-06 RX ORDER — ERYTHROPOIETIN 10000 [IU]/ML
30000 INJECTION, SOLUTION INTRAVENOUS; SUBCUTANEOUS ONCE
Refills: 0 | Status: COMPLETED | OUTPATIENT
Start: 2019-12-06 | End: 2019-12-06

## 2019-12-06 RX ADMIN — ERYTHROPOIETIN 30000 UNIT(S): 10000 INJECTION, SOLUTION INTRAVENOUS; SUBCUTANEOUS at 12:04

## 2019-12-11 LAB
HCT VFR BLD CALC: 31.8 %
HGB BLD-MCNC: 10.2 G/DL
MCHC RBC-ENTMCNC: 30.2 PG
MCHC RBC-ENTMCNC: 32.1 G/DL
MCV RBC AUTO: 94.1 FL
PLATELET # BLD AUTO: 163 K/UL
PMV BLD: 9.9 FL
RBC # BLD: 3.38 M/UL
RBC # FLD: 18.1 %
WBC # FLD AUTO: 6.77 K/UL

## 2019-12-13 ENCOUNTER — OUTPATIENT (OUTPATIENT)
Dept: OUTPATIENT SERVICES | Facility: HOSPITAL | Age: 69
LOS: 1 days | Discharge: HOME | End: 2019-12-13

## 2019-12-13 ENCOUNTER — APPOINTMENT (OUTPATIENT)
Dept: INFUSION THERAPY | Facility: CLINIC | Age: 69
End: 2019-12-13

## 2019-12-13 ENCOUNTER — LABORATORY RESULT (OUTPATIENT)
Age: 69
End: 2019-12-13

## 2019-12-13 DIAGNOSIS — Z98.41 CATARACT EXTRACTION STATUS, RIGHT EYE: Chronic | ICD-10-CM

## 2019-12-13 DIAGNOSIS — Z98.890 OTHER SPECIFIED POSTPROCEDURAL STATES: Chronic | ICD-10-CM

## 2019-12-13 DIAGNOSIS — D64.9 ANEMIA, UNSPECIFIED: ICD-10-CM

## 2019-12-13 LAB
HCT VFR BLD CALC: 31.9 %
HGB BLD-MCNC: 10.2 G/DL
MCHC RBC-ENTMCNC: 30.5 PG
MCHC RBC-ENTMCNC: 32 G/DL
MCV RBC AUTO: 95.5 FL
PLATELET # BLD AUTO: 176 K/UL
PMV BLD: 9.9 FL
RBC # BLD: 3.34 M/UL
RBC # FLD: 18.2 %
WBC # FLD AUTO: 6.56 K/UL

## 2019-12-13 RX ORDER — ERYTHROPOIETIN 10000 [IU]/ML
30000 INJECTION, SOLUTION INTRAVENOUS; SUBCUTANEOUS ONCE
Refills: 0 | Status: COMPLETED | OUTPATIENT
Start: 2019-12-13 | End: 2019-12-13

## 2019-12-13 RX ADMIN — ERYTHROPOIETIN 30000 UNIT(S): 10000 INJECTION, SOLUTION INTRAVENOUS; SUBCUTANEOUS at 11:37

## 2019-12-16 ENCOUNTER — OUTPATIENT (OUTPATIENT)
Dept: OUTPATIENT SERVICES | Facility: HOSPITAL | Age: 69
LOS: 1 days | Discharge: HOME | End: 2019-12-16

## 2019-12-16 DIAGNOSIS — Z98.41 CATARACT EXTRACTION STATUS, RIGHT EYE: Chronic | ICD-10-CM

## 2019-12-16 DIAGNOSIS — Z98.890 OTHER SPECIFIED POSTPROCEDURAL STATES: Chronic | ICD-10-CM

## 2019-12-16 DIAGNOSIS — Z79.01 LONG TERM (CURRENT) USE OF ANTICOAGULANTS: ICD-10-CM

## 2019-12-16 DIAGNOSIS — I48.91 UNSPECIFIED ATRIAL FIBRILLATION: ICD-10-CM

## 2019-12-16 LAB
POCT INR: 2.5 RATIO — HIGH (ref 0.9–1.2)
POCT PT: 30.3 SEC — HIGH (ref 10–13.4)

## 2019-12-20 ENCOUNTER — APPOINTMENT (OUTPATIENT)
Dept: INFUSION THERAPY | Facility: CLINIC | Age: 69
End: 2019-12-20

## 2019-12-20 ENCOUNTER — LABORATORY RESULT (OUTPATIENT)
Age: 69
End: 2019-12-20

## 2019-12-20 LAB
HCT VFR BLD CALC: 34.3 %
HGB BLD-MCNC: 11.1 G/DL
MCHC RBC-ENTMCNC: 31 PG
MCHC RBC-ENTMCNC: 32.4 G/DL
MCV RBC AUTO: 95.8 FL
PLATELET # BLD AUTO: 187 K/UL
PMV BLD: 10.3 FL
RBC # BLD: 3.58 M/UL
RBC # FLD: 17.4 %
WBC # FLD AUTO: 6.7 K/UL

## 2019-12-21 ENCOUNTER — APPOINTMENT (OUTPATIENT)
Dept: ENDOCRINOLOGY | Facility: CLINIC | Age: 69
End: 2019-12-21
Payer: MEDICARE

## 2019-12-21 VITALS
SYSTOLIC BLOOD PRESSURE: 150 MMHG | DIASTOLIC BLOOD PRESSURE: 93 MMHG | HEIGHT: 69 IN | HEART RATE: 69 BPM | BODY MASS INDEX: 36.49 KG/M2 | WEIGHT: 246.38 LBS | OXYGEN SATURATION: 99 %

## 2019-12-21 PROCEDURE — 99214 OFFICE O/P EST MOD 30 MIN: CPT

## 2019-12-21 NOTE — PHYSICAL EXAM
[Well Nourished] : well nourished [No Acute Distress] : no acute distress [Alert] : alert [Normal Sclera/Conjunctiva] : normal sclera/conjunctiva [EOMI] : extra ocular movement intact [Well Developed] : well developed [Normal Oropharynx] : the oropharynx was normal [No Proptosis] : no proptosis [Thyroid Not Enlarged] : the thyroid was not enlarged [No Respiratory Distress] : no respiratory distress [No Accessory Muscle Use] : no accessory muscle use [No Thyroid Nodules] : there were no palpable thyroid nodules [Clear to Auscultation] : lungs were clear to auscultation bilaterally [Normal S1, S2] : normal S1 and S2 [Normal Rate] : heart rate was normal  [Regular Rhythm] : with a regular rhythm [Pedal Pulses Normal] : the pedal pulses are present [No Edema] : there was no peripheral edema [Not Tender] : non-tender [Normal Bowel Sounds] : normal bowel sounds [Post Cervical Nodes] : posterior cervical nodes [Soft] : abdomen soft [Not Distended] : not distended [Anterior Cervical Nodes] : anterior cervical nodes [Axillary Nodes] : axillary nodes [Spine Straight] : spine straight [Normal] : normal and non tender [No Spinal Tenderness] : no spinal tenderness [No Stigmata of Cushings Syndrome] : no stigmata of cushings syndrome [Normal Gait] : normal gait [Normal Strength/Tone] : muscle strength and tone were normal [No Rash] : no rash [Normal Reflexes] : deep tendon reflexes were 2+ and symmetric [No Tremors] : no tremors [Oriented x3] : oriented to person, place, and time [Acanthosis Nigricans] : no acanthosis nigricans

## 2019-12-21 NOTE — ASSESSMENT
[FreeTextEntry1] : Pt. was seen and examined and we had long discussion regarding diabetic complication risks (eye exam, examining feet). Labs were fully reviewed with pt. including FBS, HbA1c, and micro albumin and lipids. Appropriate recommendation for eye and foot care were reviewed.\par  Counseling regarding diet, home monitoring and exercise was provided. \par Values are adequate and no new symptoms of concern (discussed neuropathy symptoms, C/p, cardiac issues and GI). To stay with current regimen. \par

## 2019-12-27 ENCOUNTER — APPOINTMENT (OUTPATIENT)
Dept: INFUSION THERAPY | Facility: CLINIC | Age: 69
End: 2019-12-27

## 2019-12-27 ENCOUNTER — LABORATORY RESULT (OUTPATIENT)
Age: 69
End: 2019-12-27

## 2019-12-27 LAB
HCT VFR BLD CALC: 32.3 %
HGB BLD-MCNC: 10.6 G/DL
MCHC RBC-ENTMCNC: 30.9 PG
MCHC RBC-ENTMCNC: 32.8 G/DL
MCV RBC AUTO: 94.2 FL
PLATELET # BLD AUTO: 175 K/UL
PMV BLD: 9.8 FL
RBC # BLD: 3.43 M/UL
RBC # FLD: 15.4 %
WBC # FLD AUTO: 8.68 K/UL

## 2020-01-03 ENCOUNTER — APPOINTMENT (OUTPATIENT)
Dept: INFUSION THERAPY | Facility: CLINIC | Age: 70
End: 2020-01-03

## 2020-01-03 ENCOUNTER — LABORATORY RESULT (OUTPATIENT)
Age: 70
End: 2020-01-03

## 2020-01-03 LAB
HCT VFR BLD CALC: 29.4 %
HGB BLD-MCNC: 9.6 G/DL
MCHC RBC-ENTMCNC: 30.2 PG
MCHC RBC-ENTMCNC: 32.7 G/DL
MCV RBC AUTO: 92.5 FL
PLATELET # BLD AUTO: 172 K/UL
PMV BLD: 10.2 FL
RBC # BLD: 3.18 M/UL
RBC # FLD: 14.2 %
WBC # FLD AUTO: 8.86 K/UL

## 2020-01-03 RX ORDER — ERYTHROPOIETIN 10000 [IU]/ML
30000 INJECTION, SOLUTION INTRAVENOUS; SUBCUTANEOUS ONCE
Refills: 0 | Status: COMPLETED | OUTPATIENT
Start: 2020-01-03 | End: 2020-01-03

## 2020-01-03 RX ADMIN — ERYTHROPOIETIN 30000 UNIT(S): 10000 INJECTION, SOLUTION INTRAVENOUS; SUBCUTANEOUS at 11:17

## 2020-01-06 ENCOUNTER — OUTPATIENT (OUTPATIENT)
Dept: OUTPATIENT SERVICES | Facility: HOSPITAL | Age: 70
LOS: 1 days | Discharge: HOME | End: 2020-01-06

## 2020-01-06 DIAGNOSIS — Z98.41 CATARACT EXTRACTION STATUS, RIGHT EYE: Chronic | ICD-10-CM

## 2020-01-06 DIAGNOSIS — I48.91 UNSPECIFIED ATRIAL FIBRILLATION: ICD-10-CM

## 2020-01-06 DIAGNOSIS — Z98.890 OTHER SPECIFIED POSTPROCEDURAL STATES: Chronic | ICD-10-CM

## 2020-01-06 DIAGNOSIS — Z79.01 LONG TERM (CURRENT) USE OF ANTICOAGULANTS: ICD-10-CM

## 2020-01-06 LAB
POCT INR: 1.8 RATIO — HIGH (ref 0.9–1.2)
POCT PT: 21.4 SEC — HIGH (ref 10–13.4)

## 2020-01-10 ENCOUNTER — APPOINTMENT (OUTPATIENT)
Dept: INFUSION THERAPY | Facility: CLINIC | Age: 70
End: 2020-01-10

## 2020-01-10 ENCOUNTER — LABORATORY RESULT (OUTPATIENT)
Age: 70
End: 2020-01-10

## 2020-01-10 LAB
HCT VFR BLD CALC: 29.3 %
HGB BLD-MCNC: 9.6 G/DL
MCHC RBC-ENTMCNC: 31.3 PG
MCHC RBC-ENTMCNC: 32.8 G/DL
MCV RBC AUTO: 95.4 FL
PLATELET # BLD AUTO: 184 K/UL
PMV BLD: 9.9 FL
RBC # BLD: 3.07 M/UL
RBC # FLD: 14.7 %
WBC # FLD AUTO: 8.09 K/UL

## 2020-01-10 RX ORDER — ERYTHROPOIETIN 10000 [IU]/ML
30000 INJECTION, SOLUTION INTRAVENOUS; SUBCUTANEOUS ONCE
Refills: 0 | Status: COMPLETED | OUTPATIENT
Start: 2020-01-10 | End: 2020-01-10

## 2020-01-10 RX ADMIN — ERYTHROPOIETIN 30000 UNIT(S): 10000 INJECTION, SOLUTION INTRAVENOUS; SUBCUTANEOUS at 11:43

## 2020-01-13 ENCOUNTER — OUTPATIENT (OUTPATIENT)
Dept: OUTPATIENT SERVICES | Facility: HOSPITAL | Age: 70
LOS: 1 days | Discharge: HOME | End: 2020-01-13

## 2020-01-13 DIAGNOSIS — Z98.890 OTHER SPECIFIED POSTPROCEDURAL STATES: Chronic | ICD-10-CM

## 2020-01-13 DIAGNOSIS — Z98.41 CATARACT EXTRACTION STATUS, RIGHT EYE: Chronic | ICD-10-CM

## 2020-01-13 DIAGNOSIS — Z79.01 LONG TERM (CURRENT) USE OF ANTICOAGULANTS: ICD-10-CM

## 2020-01-13 DIAGNOSIS — I48.91 UNSPECIFIED ATRIAL FIBRILLATION: ICD-10-CM

## 2020-01-13 LAB
INR PPP: 2 RATIO
POCT INR: 2 RATIO — HIGH (ref 0.9–1.2)
POCT PT: 24 SEC — HIGH (ref 10–13.4)
POCT-PROTHROMBIN TIME: 24 SECS

## 2020-01-17 ENCOUNTER — APPOINTMENT (OUTPATIENT)
Dept: INFUSION THERAPY | Facility: CLINIC | Age: 70
End: 2020-01-17

## 2020-01-17 ENCOUNTER — LABORATORY RESULT (OUTPATIENT)
Age: 70
End: 2020-01-17

## 2020-01-17 LAB
HCT VFR BLD CALC: 30.8 %
HGB BLD-MCNC: 10.1 G/DL
MCHC RBC-ENTMCNC: 31 PG
MCHC RBC-ENTMCNC: 32.8 G/DL
MCV RBC AUTO: 94.5 FL
PLATELET # BLD AUTO: 156 K/UL
PMV BLD: 9.6 FL
RBC # BLD: 3.26 M/UL
RBC # FLD: 14.9 %
WBC # FLD AUTO: 6.08 K/UL

## 2020-01-17 RX ORDER — ERYTHROPOIETIN 10000 [IU]/ML
30000 INJECTION, SOLUTION INTRAVENOUS; SUBCUTANEOUS ONCE
Refills: 0 | Status: DISCONTINUED | OUTPATIENT
Start: 2020-01-17 | End: 2020-01-17

## 2020-01-17 RX ORDER — ERYTHROPOIETIN 10000 [IU]/ML
30000 INJECTION, SOLUTION INTRAVENOUS; SUBCUTANEOUS ONCE
Refills: 0 | Status: COMPLETED | OUTPATIENT
Start: 2020-01-17 | End: 2020-01-17

## 2020-01-17 RX ADMIN — ERYTHROPOIETIN 30000 UNIT(S): 10000 INJECTION, SOLUTION INTRAVENOUS; SUBCUTANEOUS at 11:20

## 2020-01-24 ENCOUNTER — APPOINTMENT (OUTPATIENT)
Dept: INFUSION THERAPY | Facility: CLINIC | Age: 70
End: 2020-01-24

## 2020-01-24 ENCOUNTER — LABORATORY RESULT (OUTPATIENT)
Age: 70
End: 2020-01-24

## 2020-01-24 LAB
HCT VFR BLD CALC: 35 %
HGB BLD-MCNC: 11.3 G/DL
MCHC RBC-ENTMCNC: 30.9 PG
MCHC RBC-ENTMCNC: 32.3 G/DL
MCV RBC AUTO: 95.6 FL
PLATELET # BLD AUTO: 203 K/UL
PMV BLD: 9.7 FL
RBC # BLD: 3.66 M/UL
RBC # FLD: 15.4 %
WBC # FLD AUTO: 7.79 K/UL

## 2020-01-27 ENCOUNTER — OUTPATIENT (OUTPATIENT)
Dept: OUTPATIENT SERVICES | Facility: HOSPITAL | Age: 70
LOS: 1 days | Discharge: HOME | End: 2020-01-27

## 2020-01-27 DIAGNOSIS — Z79.01 LONG TERM (CURRENT) USE OF ANTICOAGULANTS: ICD-10-CM

## 2020-01-27 DIAGNOSIS — Z98.41 CATARACT EXTRACTION STATUS, RIGHT EYE: Chronic | ICD-10-CM

## 2020-01-27 DIAGNOSIS — Z98.890 OTHER SPECIFIED POSTPROCEDURAL STATES: Chronic | ICD-10-CM

## 2020-01-27 DIAGNOSIS — I48.91 UNSPECIFIED ATRIAL FIBRILLATION: ICD-10-CM

## 2020-01-27 LAB
POCT INR: 1.9 RATIO — HIGH (ref 0.9–1.2)
POCT PT: 22.4 SEC — HIGH (ref 10–13.4)

## 2020-01-28 LAB
INR PPP: 1.9 RATIO
POCT-PROTHROMBIN TIME: 22.4 SECS

## 2020-01-31 ENCOUNTER — APPOINTMENT (OUTPATIENT)
Dept: INFUSION THERAPY | Facility: CLINIC | Age: 70
End: 2020-01-31

## 2020-01-31 ENCOUNTER — LABORATORY RESULT (OUTPATIENT)
Age: 70
End: 2020-01-31

## 2020-01-31 LAB
HCT VFR BLD CALC: 34.2 %
HGB BLD-MCNC: 11.2 G/DL
MCHC RBC-ENTMCNC: 31.1 PG
MCHC RBC-ENTMCNC: 32.7 G/DL
MCV RBC AUTO: 95 FL
PLATELET # BLD AUTO: 185 K/UL
PMV BLD: 10.2 FL
RBC # BLD: 3.6 M/UL
RBC # FLD: 14.3 %
WBC # FLD AUTO: 9.28 K/UL

## 2020-02-03 ENCOUNTER — OUTPATIENT (OUTPATIENT)
Dept: OUTPATIENT SERVICES | Facility: HOSPITAL | Age: 70
LOS: 1 days | Discharge: HOME | End: 2020-02-03

## 2020-02-03 DIAGNOSIS — I48.91 UNSPECIFIED ATRIAL FIBRILLATION: ICD-10-CM

## 2020-02-03 DIAGNOSIS — Z98.890 OTHER SPECIFIED POSTPROCEDURAL STATES: Chronic | ICD-10-CM

## 2020-02-03 DIAGNOSIS — Z79.01 LONG TERM (CURRENT) USE OF ANTICOAGULANTS: ICD-10-CM

## 2020-02-03 DIAGNOSIS — Z98.41 CATARACT EXTRACTION STATUS, RIGHT EYE: Chronic | ICD-10-CM

## 2020-02-03 LAB
POCT INR: 2.4 RATIO — HIGH (ref 0.9–1.2)
POCT PT: 28.8 SEC — HIGH (ref 10–13.4)

## 2020-02-06 LAB
INR PPP: 2.4 RATIO
POCT-PROTHROMBIN TIME: 28.8 SECS

## 2020-02-07 ENCOUNTER — APPOINTMENT (OUTPATIENT)
Dept: INFUSION THERAPY | Facility: CLINIC | Age: 70
End: 2020-02-07

## 2020-02-07 ENCOUNTER — LABORATORY RESULT (OUTPATIENT)
Age: 70
End: 2020-02-07

## 2020-02-07 LAB
HCT VFR BLD CALC: 30.6 %
HGB BLD-MCNC: 10.1 G/DL
MCHC RBC-ENTMCNC: 31 PG
MCHC RBC-ENTMCNC: 33 G/DL
MCV RBC AUTO: 93.9 FL
PLATELET # BLD AUTO: 173 K/UL
PMV BLD: 10.5 FL
RBC # BLD: 3.26 M/UL
RBC # FLD: 13.3 %
WBC # FLD AUTO: 8.39 K/UL

## 2020-02-07 RX ORDER — ERYTHROPOIETIN 10000 [IU]/ML
30000 INJECTION, SOLUTION INTRAVENOUS; SUBCUTANEOUS ONCE
Refills: 0 | Status: COMPLETED | OUTPATIENT
Start: 2020-02-07 | End: 2020-02-07

## 2020-02-07 RX ADMIN — ERYTHROPOIETIN 30000 UNIT(S): 10000 INJECTION, SOLUTION INTRAVENOUS; SUBCUTANEOUS at 10:51

## 2020-02-11 ENCOUNTER — OUTPATIENT (OUTPATIENT)
Dept: OUTPATIENT SERVICES | Facility: HOSPITAL | Age: 70
LOS: 1 days | Discharge: HOME | End: 2020-02-11

## 2020-02-11 DIAGNOSIS — Z98.41 CATARACT EXTRACTION STATUS, RIGHT EYE: Chronic | ICD-10-CM

## 2020-02-11 DIAGNOSIS — Z98.890 OTHER SPECIFIED POSTPROCEDURAL STATES: Chronic | ICD-10-CM

## 2020-02-11 DIAGNOSIS — Z79.01 LONG TERM (CURRENT) USE OF ANTICOAGULANTS: ICD-10-CM

## 2020-02-11 DIAGNOSIS — I48.91 UNSPECIFIED ATRIAL FIBRILLATION: ICD-10-CM

## 2020-02-11 LAB
POCT INR: 1.4 RATIO — HIGH (ref 0.9–1.2)
POCT PT: 16.8 SEC — HIGH (ref 10–13.4)

## 2020-02-14 ENCOUNTER — LABORATORY RESULT (OUTPATIENT)
Age: 70
End: 2020-02-14

## 2020-02-14 ENCOUNTER — APPOINTMENT (OUTPATIENT)
Dept: HEMATOLOGY ONCOLOGY | Facility: CLINIC | Age: 70
End: 2020-02-14
Payer: MEDICARE

## 2020-02-14 ENCOUNTER — APPOINTMENT (OUTPATIENT)
Dept: INFUSION THERAPY | Facility: CLINIC | Age: 70
End: 2020-02-14
Payer: MEDICARE

## 2020-02-14 VITALS
TEMPERATURE: 98.4 F | WEIGHT: 249 LBS | SYSTOLIC BLOOD PRESSURE: 160 MMHG | RESPIRATION RATE: 16 BRPM | HEIGHT: 69 IN | HEART RATE: 73 BPM | BODY MASS INDEX: 36.88 KG/M2 | DIASTOLIC BLOOD PRESSURE: 78 MMHG

## 2020-02-14 LAB
HCT VFR BLD CALC: 31.3 %
HGB BLD-MCNC: 10.1 G/DL
MCHC RBC-ENTMCNC: 30.1 PG
MCHC RBC-ENTMCNC: 32.3 G/DL
MCV RBC AUTO: 93.4 FL
PLATELET # BLD AUTO: 215 K/UL
PMV BLD: 9.9 FL
RBC # BLD: 3.35 M/UL
RBC # FLD: 13.8 %
WBC # FLD AUTO: 7.8 K/UL

## 2020-02-14 PROCEDURE — 99213 OFFICE O/P EST LOW 20 MIN: CPT

## 2020-02-14 RX ORDER — ERYTHROPOIETIN 10000 [IU]/ML
30000 INJECTION, SOLUTION INTRAVENOUS; SUBCUTANEOUS ONCE
Refills: 0 | Status: COMPLETED | OUTPATIENT
Start: 2020-02-14 | End: 2020-02-14

## 2020-02-14 RX ADMIN — ERYTHROPOIETIN 30000 UNIT(S): 10000 INJECTION, SOLUTION INTRAVENOUS; SUBCUTANEOUS at 12:11
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
## 2020-02-14 NOTE — ASSESSMENT
[FreeTextEntry1] : Anemia, normocytic, with normal RDW, secondary to kidney disease and inflammation.\par \par The patient will continue with Procrit injections given weekly.\par Will hold the Procrit if Hgb above 10.5.\par \par The patient doesn't need to see us more often than once every 6 months and as needed. He will keep all his appointment with all his other physicians.\par \par All questions answered.

## 2020-02-14 NOTE — PHYSICAL EXAM
[Restricted in physically strenuous activity but ambulatory and able to carry out work of a light or sedentary nature] : Status 1- Restricted in physically strenuous activity but ambulatory and able to carry out work of a light or sedentary nature, e.g., light house work, office work [Obese] : obese [Normal] : normoactive bowel sounds, soft and nontender, no hepatosplenomegaly or masses appreciated [de-identified] : Some arthritic changes [de-identified] : Healed scar on the right forearm note [de-identified] : Mild edema of the RLE, not new

## 2020-02-14 NOTE — REVIEW OF SYSTEMS
[Recent Change In Weight] : ~T no recent weight change [Vision Problems] : vision problems [Lower Ext Edema] : lower extremity edema [SOB on Exertion] : shortness of breath during exertion [Easy Bruising] : a tendency for easy bruising [Negative] : Neurological [de-identified] : Although he has sleep apnea. Sleeps well with the machine. [FreeTextEntry3] : Gets intraocular injections

## 2020-02-18 ENCOUNTER — OUTPATIENT (OUTPATIENT)
Dept: OUTPATIENT SERVICES | Facility: HOSPITAL | Age: 70
LOS: 1 days | Discharge: HOME | End: 2020-02-18

## 2020-02-18 DIAGNOSIS — Z98.41 CATARACT EXTRACTION STATUS, RIGHT EYE: Chronic | ICD-10-CM

## 2020-02-18 DIAGNOSIS — Z79.01 LONG TERM (CURRENT) USE OF ANTICOAGULANTS: ICD-10-CM

## 2020-02-18 DIAGNOSIS — I48.91 UNSPECIFIED ATRIAL FIBRILLATION: ICD-10-CM

## 2020-02-18 DIAGNOSIS — Z98.890 OTHER SPECIFIED POSTPROCEDURAL STATES: Chronic | ICD-10-CM

## 2020-02-18 LAB
INR PPP: 1.4 RATIO
INR PPP: 2.8 RATIO
POCT INR: 2.8 RATIO — HIGH (ref 0.9–1.2)
POCT PT: 33.3 SEC — HIGH (ref 10–13.4)
POCT-PROTHROMBIN TIME: 16.8 SECS
POCT-PROTHROMBIN TIME: 33.3 SECS

## 2020-02-21 ENCOUNTER — LABORATORY RESULT (OUTPATIENT)
Age: 70
End: 2020-02-21

## 2020-02-21 ENCOUNTER — APPOINTMENT (OUTPATIENT)
Dept: INFUSION THERAPY | Facility: CLINIC | Age: 70
End: 2020-02-21

## 2020-02-21 LAB
HCT VFR BLD CALC: 33.8 %
HGB BLD-MCNC: 11 G/DL
MCHC RBC-ENTMCNC: 30.6 PG
MCHC RBC-ENTMCNC: 32.5 G/DL
MCV RBC AUTO: 94.2 FL
PLATELET # BLD AUTO: 196 K/UL
PMV BLD: 10 FL
RBC # BLD: 3.59 M/UL
RBC # FLD: 15.4 %
WBC # FLD AUTO: 8.02 K/UL

## 2020-02-24 ENCOUNTER — OUTPATIENT (OUTPATIENT)
Dept: OUTPATIENT SERVICES | Facility: HOSPITAL | Age: 70
LOS: 1 days | Discharge: HOME | End: 2020-02-24

## 2020-02-24 DIAGNOSIS — I48.91 UNSPECIFIED ATRIAL FIBRILLATION: ICD-10-CM

## 2020-02-24 DIAGNOSIS — Z98.890 OTHER SPECIFIED POSTPROCEDURAL STATES: Chronic | ICD-10-CM

## 2020-02-24 DIAGNOSIS — Z98.41 CATARACT EXTRACTION STATUS, RIGHT EYE: Chronic | ICD-10-CM

## 2020-02-24 DIAGNOSIS — Z79.01 LONG TERM (CURRENT) USE OF ANTICOAGULANTS: ICD-10-CM

## 2020-02-24 LAB
POCT INR: 2.6 RATIO — HIGH (ref 0.9–1.2)
POCT PT: 30.7 SEC — HIGH (ref 10–13.4)

## 2020-02-25 LAB
INR PPP: 2.6 RATIO
POCT-PROTHROMBIN TIME: 30.7 SECS

## 2020-02-28 ENCOUNTER — LABORATORY RESULT (OUTPATIENT)
Age: 70
End: 2020-02-28

## 2020-02-28 ENCOUNTER — APPOINTMENT (OUTPATIENT)
Dept: INFUSION THERAPY | Facility: CLINIC | Age: 70
End: 2020-02-28

## 2020-02-28 LAB
HCT VFR BLD CALC: 33 %
HGB BLD-MCNC: 10.9 G/DL
MCHC RBC-ENTMCNC: 30.4 PG
MCHC RBC-ENTMCNC: 33 G/DL
MCV RBC AUTO: 91.9 FL
PLATELET # BLD AUTO: 187 K/UL
PMV BLD: 10.8 FL
RBC # BLD: 3.59 M/UL
RBC # FLD: 14.6 %
WBC # FLD AUTO: 6.79 K/UL

## 2020-03-06 ENCOUNTER — LABORATORY RESULT (OUTPATIENT)
Age: 70
End: 2020-03-06

## 2020-03-06 ENCOUNTER — APPOINTMENT (OUTPATIENT)
Dept: INFUSION THERAPY | Facility: CLINIC | Age: 70
End: 2020-03-06

## 2020-03-06 LAB
HCT VFR BLD CALC: 30.8 %
HGB BLD-MCNC: 10.2 G/DL
MCHC RBC-ENTMCNC: 30.4 PG
MCHC RBC-ENTMCNC: 33.1 G/DL
MCV RBC AUTO: 91.9 FL
PLATELET # BLD AUTO: 170 K/UL
PMV BLD: 10.3 FL
RBC # BLD: 3.35 M/UL
RBC # FLD: 14.3 %
WBC # FLD AUTO: 8.21 K/UL

## 2020-03-06 RX ORDER — ERYTHROPOIETIN 10000 [IU]/ML
30000 INJECTION, SOLUTION INTRAVENOUS; SUBCUTANEOUS ONCE
Refills: 0 | Status: COMPLETED | OUTPATIENT
Start: 2020-03-06 | End: 2020-03-06

## 2020-03-06 RX ADMIN — ERYTHROPOIETIN 30000 UNIT(S): 10000 INJECTION, SOLUTION INTRAVENOUS; SUBCUTANEOUS at 11:10

## 2020-03-09 ENCOUNTER — OUTPATIENT (OUTPATIENT)
Dept: OUTPATIENT SERVICES | Facility: HOSPITAL | Age: 70
LOS: 1 days | Discharge: HOME | End: 2020-03-09

## 2020-03-09 DIAGNOSIS — Z98.890 OTHER SPECIFIED POSTPROCEDURAL STATES: Chronic | ICD-10-CM

## 2020-03-09 DIAGNOSIS — Z79.01 LONG TERM (CURRENT) USE OF ANTICOAGULANTS: ICD-10-CM

## 2020-03-09 DIAGNOSIS — Z98.41 CATARACT EXTRACTION STATUS, RIGHT EYE: Chronic | ICD-10-CM

## 2020-03-09 DIAGNOSIS — I48.91 UNSPECIFIED ATRIAL FIBRILLATION: ICD-10-CM

## 2020-03-09 LAB
POCT INR: 2.2 RATIO — HIGH (ref 0.9–1.2)
POCT PT: 26.1 SEC — HIGH (ref 10–13.4)

## 2020-03-13 ENCOUNTER — LABORATORY RESULT (OUTPATIENT)
Age: 70
End: 2020-03-13

## 2020-03-13 ENCOUNTER — OUTPATIENT (OUTPATIENT)
Dept: OUTPATIENT SERVICES | Facility: HOSPITAL | Age: 70
LOS: 1 days | Discharge: HOME | End: 2020-03-13

## 2020-03-13 ENCOUNTER — APPOINTMENT (OUTPATIENT)
Dept: INFUSION THERAPY | Facility: CLINIC | Age: 70
End: 2020-03-13

## 2020-03-13 DIAGNOSIS — N18.9 CHRONIC KIDNEY DISEASE, UNSPECIFIED: ICD-10-CM

## 2020-03-13 DIAGNOSIS — D64.9 ANEMIA, UNSPECIFIED: ICD-10-CM

## 2020-03-13 DIAGNOSIS — Z98.890 OTHER SPECIFIED POSTPROCEDURAL STATES: Chronic | ICD-10-CM

## 2020-03-13 DIAGNOSIS — Z98.41 CATARACT EXTRACTION STATUS, RIGHT EYE: Chronic | ICD-10-CM

## 2020-03-13 DIAGNOSIS — D59.4 OTHER NONAUTOIMMUNE HEMOLYTIC ANEMIAS: ICD-10-CM

## 2020-03-13 LAB
HCT VFR BLD CALC: 29.7 %
HGB BLD-MCNC: 9.9 G/DL
MCHC RBC-ENTMCNC: 30.7 PG
MCHC RBC-ENTMCNC: 33.3 G/DL
MCV RBC AUTO: 92.2 FL
PLATELET # BLD AUTO: 193 K/UL
PMV BLD: 10.5 FL
RBC # BLD: 3.22 M/UL
RBC # FLD: 14.9 %
WBC # FLD AUTO: 8.04 K/UL

## 2020-03-13 RX ORDER — ERYTHROPOIETIN 10000 [IU]/ML
30000 INJECTION, SOLUTION INTRAVENOUS; SUBCUTANEOUS ONCE
Refills: 0 | Status: COMPLETED | OUTPATIENT
Start: 2020-03-13 | End: 2020-03-13

## 2020-03-13 RX ADMIN — ERYTHROPOIETIN 30000 UNIT(S): 10000 INJECTION, SOLUTION INTRAVENOUS; SUBCUTANEOUS at 11:00

## 2020-03-17 ENCOUNTER — APPOINTMENT (OUTPATIENT)
Dept: UROLOGY | Facility: CLINIC | Age: 70
End: 2020-03-17

## 2020-03-20 ENCOUNTER — LABORATORY RESULT (OUTPATIENT)
Age: 70
End: 2020-03-20

## 2020-03-20 ENCOUNTER — APPOINTMENT (OUTPATIENT)
Dept: INFUSION THERAPY | Facility: CLINIC | Age: 70
End: 2020-03-20

## 2020-03-20 LAB
HCT VFR BLD CALC: 32 %
HGB BLD-MCNC: 10.4 G/DL
MCHC RBC-ENTMCNC: 30.7 PG
MCHC RBC-ENTMCNC: 32.5 G/DL
MCV RBC AUTO: 94.4 FL
PLATELET # BLD AUTO: 173 K/UL
PMV BLD: 10.4 FL
RBC # BLD: 3.39 M/UL
RBC # FLD: 16.4 %
WBC # FLD AUTO: 8.31 K/UL

## 2020-03-20 RX ORDER — ERYTHROPOIETIN 10000 [IU]/ML
30000 INJECTION, SOLUTION INTRAVENOUS; SUBCUTANEOUS ONCE
Refills: 0 | Status: COMPLETED | OUTPATIENT
Start: 2020-03-20 | End: 2020-03-20

## 2020-03-20 RX ADMIN — ERYTHROPOIETIN 30000 UNIT(S): 10000 INJECTION, SOLUTION INTRAVENOUS; SUBCUTANEOUS at 11:01

## 2020-03-23 ENCOUNTER — OUTPATIENT (OUTPATIENT)
Dept: OUTPATIENT SERVICES | Facility: HOSPITAL | Age: 70
LOS: 1 days | Discharge: HOME | End: 2020-03-23

## 2020-03-23 DIAGNOSIS — Z98.41 CATARACT EXTRACTION STATUS, RIGHT EYE: Chronic | ICD-10-CM

## 2020-03-23 DIAGNOSIS — Z98.890 OTHER SPECIFIED POSTPROCEDURAL STATES: Chronic | ICD-10-CM

## 2020-03-23 DIAGNOSIS — Z79.01 LONG TERM (CURRENT) USE OF ANTICOAGULANTS: ICD-10-CM

## 2020-03-23 DIAGNOSIS — I48.91 UNSPECIFIED ATRIAL FIBRILLATION: ICD-10-CM

## 2020-03-23 LAB
POCT INR: 2 RATIO — HIGH (ref 0.9–1.2)
POCT PT: 24 SEC — HIGH (ref 10–13.4)

## 2020-03-24 LAB
INR PPP: 2 RATIO
POCT-PROTHROMBIN TIME: 24 SECS

## 2020-03-27 ENCOUNTER — LABORATORY RESULT (OUTPATIENT)
Age: 70
End: 2020-03-27

## 2020-03-27 ENCOUNTER — APPOINTMENT (OUTPATIENT)
Dept: INFUSION THERAPY | Facility: CLINIC | Age: 70
End: 2020-03-27

## 2020-03-27 LAB
HCT VFR BLD CALC: 33.8 %
HGB BLD-MCNC: 11.2 G/DL
MCHC RBC-ENTMCNC: 30.7 PG
MCHC RBC-ENTMCNC: 33.1 G/DL
MCV RBC AUTO: 92.6 FL
PLATELET # BLD AUTO: 177 K/UL
PMV BLD: 10.2 FL
RBC # BLD: 3.65 M/UL
RBC # FLD: 16.2 %
WBC # FLD AUTO: 7.35 K/UL

## 2020-04-03 ENCOUNTER — APPOINTMENT (OUTPATIENT)
Dept: INFUSION THERAPY | Facility: CLINIC | Age: 70
End: 2020-04-03

## 2020-04-03 ENCOUNTER — LABORATORY RESULT (OUTPATIENT)
Age: 70
End: 2020-04-03

## 2020-04-03 LAB
HCT VFR BLD CALC: 32.9 %
HGB BLD-MCNC: 10.7 G/DL
MCHC RBC-ENTMCNC: 30.4 PG
MCHC RBC-ENTMCNC: 32.5 G/DL
MCV RBC AUTO: 93.5 FL
PLATELET # BLD AUTO: 164 K/UL
PMV BLD: 10 FL
RBC # BLD: 3.52 M/UL
RBC # FLD: 15 %
WBC # FLD AUTO: 6.37 K/UL

## 2020-04-06 ENCOUNTER — OUTPATIENT (OUTPATIENT)
Dept: OUTPATIENT SERVICES | Facility: HOSPITAL | Age: 70
LOS: 1 days | Discharge: HOME | End: 2020-04-06

## 2020-04-06 DIAGNOSIS — Z98.41 CATARACT EXTRACTION STATUS, RIGHT EYE: Chronic | ICD-10-CM

## 2020-04-06 DIAGNOSIS — Z98.890 OTHER SPECIFIED POSTPROCEDURAL STATES: Chronic | ICD-10-CM

## 2020-04-06 DIAGNOSIS — I48.91 UNSPECIFIED ATRIAL FIBRILLATION: ICD-10-CM

## 2020-04-06 DIAGNOSIS — Z79.01 LONG TERM (CURRENT) USE OF ANTICOAGULANTS: ICD-10-CM

## 2020-04-06 LAB
POCT INR: 2.4 RATIO — HIGH (ref 0.9–1.2)
POCT PT: 28.8 SEC — HIGH (ref 10–13.4)

## 2020-04-08 LAB
INR PPP: 2.4 RATIO
POCT-PROTHROMBIN TIME: 28.8 SECS
QUALITY CONTROL: YES

## 2020-04-10 ENCOUNTER — LABORATORY RESULT (OUTPATIENT)
Age: 70
End: 2020-04-10

## 2020-04-10 ENCOUNTER — APPOINTMENT (OUTPATIENT)
Dept: INFUSION THERAPY | Facility: CLINIC | Age: 70
End: 2020-04-10

## 2020-04-10 LAB
HCT VFR BLD CALC: 33.4 %
HGB BLD-MCNC: 11.1 G/DL
MCHC RBC-ENTMCNC: 30.4 PG
MCHC RBC-ENTMCNC: 33.2 G/DL
MCV RBC AUTO: 91.5 FL
PLATELET # BLD AUTO: 179 K/UL
PMV BLD: 10.4 FL
RBC # BLD: 3.65 M/UL
RBC # FLD: 14.3 %
WBC # FLD AUTO: 8.33 K/UL

## 2020-04-17 ENCOUNTER — LABORATORY RESULT (OUTPATIENT)
Age: 70
End: 2020-04-17

## 2020-04-17 ENCOUNTER — APPOINTMENT (OUTPATIENT)
Dept: INFUSION THERAPY | Facility: CLINIC | Age: 70
End: 2020-04-17

## 2020-04-17 LAB
HCT VFR BLD CALC: 29.1 %
HGB BLD-MCNC: 9.7 G/DL
MCHC RBC-ENTMCNC: 30.3 PG
MCHC RBC-ENTMCNC: 33.3 G/DL
MCV RBC AUTO: 90.9 FL
PLATELET # BLD AUTO: 175 K/UL
PMV BLD: 10.3 FL
RBC # BLD: 3.2 M/UL
RBC # FLD: 14.1 %
WBC # FLD AUTO: 7.77 K/UL

## 2020-04-17 RX ORDER — ERYTHROPOIETIN 10000 [IU]/ML
30000 INJECTION, SOLUTION INTRAVENOUS; SUBCUTANEOUS ONCE
Refills: 0 | Status: COMPLETED | OUTPATIENT
Start: 2020-04-17 | End: 2020-04-17

## 2020-04-17 RX ADMIN — ERYTHROPOIETIN 30000 UNIT(S): 10000 INJECTION, SOLUTION INTRAVENOUS; SUBCUTANEOUS at 10:55

## 2020-04-24 ENCOUNTER — LABORATORY RESULT (OUTPATIENT)
Age: 70
End: 2020-04-24

## 2020-04-24 ENCOUNTER — APPOINTMENT (OUTPATIENT)
Dept: INFUSION THERAPY | Facility: CLINIC | Age: 70
End: 2020-04-24

## 2020-04-24 LAB
HCT VFR BLD CALC: 28.4 %
HGB BLD-MCNC: 9.5 G/DL
MCHC RBC-ENTMCNC: 30.8 PG
MCHC RBC-ENTMCNC: 33.5 G/DL
MCV RBC AUTO: 92.2 FL
PLATELET # BLD AUTO: 182 K/UL
PMV BLD: 10.3 FL
RBC # BLD: 3.08 M/UL
RBC # FLD: 14.9 %
WBC # FLD AUTO: 9.02 K/UL

## 2020-04-24 RX ORDER — ERYTHROPOIETIN 10000 [IU]/ML
30000 INJECTION, SOLUTION INTRAVENOUS; SUBCUTANEOUS ONCE
Refills: 0 | Status: COMPLETED | OUTPATIENT
Start: 2020-04-24 | End: 2020-04-24

## 2020-04-24 RX ADMIN — ERYTHROPOIETIN 30000 UNIT(S): 10000 INJECTION, SOLUTION INTRAVENOUS; SUBCUTANEOUS at 11:08

## 2020-04-27 ENCOUNTER — OUTPATIENT (OUTPATIENT)
Dept: OUTPATIENT SERVICES | Facility: HOSPITAL | Age: 70
LOS: 1 days | Discharge: HOME | End: 2020-04-27

## 2020-04-27 DIAGNOSIS — Z79.01 LONG TERM (CURRENT) USE OF ANTICOAGULANTS: ICD-10-CM

## 2020-04-27 DIAGNOSIS — Z98.41 CATARACT EXTRACTION STATUS, RIGHT EYE: Chronic | ICD-10-CM

## 2020-04-27 DIAGNOSIS — I48.91 UNSPECIFIED ATRIAL FIBRILLATION: ICD-10-CM

## 2020-04-27 DIAGNOSIS — Z98.890 OTHER SPECIFIED POSTPROCEDURAL STATES: Chronic | ICD-10-CM

## 2020-04-27 LAB
INR PPP: 4 RATIO
POCT INR: 4 RATIO — HIGH (ref 0.9–1.2)
POCT PT: 47.8 SEC — HIGH (ref 10–13.4)
POCT-PROTHROMBIN TIME: 47.8 SECS

## 2020-05-01 ENCOUNTER — LABORATORY RESULT (OUTPATIENT)
Age: 70
End: 2020-05-01

## 2020-05-01 ENCOUNTER — APPOINTMENT (OUTPATIENT)
Dept: INFUSION THERAPY | Facility: CLINIC | Age: 70
End: 2020-05-01

## 2020-05-01 LAB
HCT VFR BLD CALC: 27.9 %
HGB BLD-MCNC: 9.1 G/DL
MCHC RBC-ENTMCNC: 30.6 PG
MCHC RBC-ENTMCNC: 32.6 G/DL
MCV RBC AUTO: 93.9 FL
PLATELET # BLD AUTO: 175 K/UL
PMV BLD: 10.7 FL
RBC # BLD: 2.97 M/UL
RBC # FLD: 15.9 %
WBC # FLD AUTO: 8.93 K/UL

## 2020-05-01 RX ORDER — ERYTHROPOIETIN 10000 [IU]/ML
30000 INJECTION, SOLUTION INTRAVENOUS; SUBCUTANEOUS ONCE
Refills: 0 | Status: COMPLETED | OUTPATIENT
Start: 2020-05-01 | End: 2020-05-01

## 2020-05-01 RX ADMIN — ERYTHROPOIETIN 30000 UNIT(S): 10000 INJECTION, SOLUTION INTRAVENOUS; SUBCUTANEOUS at 11:25

## 2020-05-04 ENCOUNTER — OUTPATIENT (OUTPATIENT)
Dept: OUTPATIENT SERVICES | Facility: HOSPITAL | Age: 70
LOS: 1 days | Discharge: HOME | End: 2020-05-04

## 2020-05-04 DIAGNOSIS — Z98.41 CATARACT EXTRACTION STATUS, RIGHT EYE: Chronic | ICD-10-CM

## 2020-05-04 DIAGNOSIS — I48.91 UNSPECIFIED ATRIAL FIBRILLATION: ICD-10-CM

## 2020-05-04 DIAGNOSIS — Z79.01 LONG TERM (CURRENT) USE OF ANTICOAGULANTS: ICD-10-CM

## 2020-05-04 DIAGNOSIS — Z98.890 OTHER SPECIFIED POSTPROCEDURAL STATES: Chronic | ICD-10-CM

## 2020-05-04 LAB
INR PPP: 3.6 RATIO
POCT INR: 3.6 RATIO — HIGH (ref 0.9–1.2)
POCT PT: 43 SEC — HIGH (ref 10–13.4)
POCT-PROTHROMBIN TIME: 43 SECS

## 2020-05-08 ENCOUNTER — APPOINTMENT (OUTPATIENT)
Dept: INFUSION THERAPY | Facility: CLINIC | Age: 70
End: 2020-05-08
Payer: MEDICARE

## 2020-05-08 ENCOUNTER — APPOINTMENT (OUTPATIENT)
Dept: HEMATOLOGY ONCOLOGY | Facility: CLINIC | Age: 70
End: 2020-05-08
Payer: MEDICARE

## 2020-05-08 ENCOUNTER — LABORATORY RESULT (OUTPATIENT)
Age: 70
End: 2020-05-08

## 2020-05-08 VITALS
HEIGHT: 69 IN | SYSTOLIC BLOOD PRESSURE: 136 MMHG | BODY MASS INDEX: 37.92 KG/M2 | WEIGHT: 256 LBS | HEART RATE: 71 BPM | DIASTOLIC BLOOD PRESSURE: 74 MMHG | TEMPERATURE: 97.6 F | RESPIRATION RATE: 18 BRPM

## 2020-05-08 LAB
HCT VFR BLD CALC: 28.5 %
HGB BLD-MCNC: 9.3 G/DL
MCHC RBC-ENTMCNC: 30.9 PG
MCHC RBC-ENTMCNC: 32.6 G/DL
MCV RBC AUTO: 94.7 FL
PLATELET # BLD AUTO: 204 K/UL
PMV BLD: 10.1 FL
RBC # BLD: 3.01 M/UL
RBC # FLD: 16.3 %
WBC # FLD AUTO: 7.45 K/UL

## 2020-05-08 PROCEDURE — 99213 OFFICE O/P EST LOW 20 MIN: CPT

## 2020-05-08 RX ORDER — ERYTHROPOIETIN 10000 [IU]/ML
30000 INJECTION, SOLUTION INTRAVENOUS; SUBCUTANEOUS ONCE
Refills: 0 | Status: COMPLETED | OUTPATIENT
Start: 2020-05-08 | End: 2020-05-08

## 2020-05-08 RX ADMIN — ERYTHROPOIETIN 30000 UNIT(S): 10000 INJECTION, SOLUTION INTRAVENOUS; SUBCUTANEOUS at 11:48

## 2020-05-08 NOTE — PHYSICAL EXAM
[Fully active, able to carry on all pre-disease performance without restriction] : Status 0 - Fully active, able to carry on all pre-disease performance without restriction [Obese] : obese [Normal] : affect appropriate [de-identified] : But somewhat distant sounds [de-identified] : Mild arthritic changes [de-identified] : Chronic trophic skin changes with very mildly pitting and more pronounced non-pitting edema bilaterally

## 2020-05-08 NOTE — HISTORY OF PRESENT ILLNESS
[Disease:__________________________] : Disease: [unfilled] [de-identified] : The patient is coming for his regularly scheduled follow up for his chronic anemia secondary to kidney disease and possibly inflammation. \par The patient is denying any new problems. \par He is on no new medications.\par He is still getting some shortness of breath with exertion but that's not new. In addition, he has gained some weight.

## 2020-05-08 NOTE — REVIEW OF SYSTEMS
[Fatigue] : fatigue [Recent Change In Weight] : ~T recent weight change [Loss of Hearing] : loss of hearing [Lower Ext Edema] : lower extremity edema [SOB on Exertion] : shortness of breath during exertion [Easy Bruising] : a tendency for easy bruising [Negative] : Heme/Lymph [FreeTextEntry2] : Gained weight. [FreeTextEntry5] : On and off [de-identified] : Patient on Coumadin

## 2020-05-08 NOTE — ASSESSMENT
[FreeTextEntry1] : Anemia, normocytic, secondary to chronic kidney disease and possibly inflammation given his being overweight.\par Since Hgb is still below 10.5-11 range, will continue with Retacrit injections. He was doing well for 3 weeks without requiring any injection, but, as expected, eventually he required it.\par At present, the patient is only on 30,000 u SQ weekly. If no desired improvement noted, we will increase the dose to 40,000 u.\par All his questions answered.\par The patient will continue to be followed regularly and will be seen by myself again in 6 months and as needed but the CBC will be monitored more closely.\par

## 2020-05-08 NOTE — CONSULT LETTER
[Dear  ___] : Dear  [unfilled], [Courtesy Letter:] : I had the pleasure of seeing your patient, [unfilled], in my office today. [Consult Closing:] : Thank you very much for allowing me to participate in the care of this patient.  If you have any questions, please do not hesitate to contact me. [Please see my note below.] : Please see my note below. [FreeTextEntry2] : Dr. Morton Kleiner [Sincerely,] : Sincerely, [FreeTextEntry3] : Dr. JAKOB Hooper

## 2020-05-11 ENCOUNTER — OUTPATIENT (OUTPATIENT)
Dept: OUTPATIENT SERVICES | Facility: HOSPITAL | Age: 70
LOS: 1 days | Discharge: HOME | End: 2020-05-11

## 2020-05-11 DIAGNOSIS — I48.91 UNSPECIFIED ATRIAL FIBRILLATION: ICD-10-CM

## 2020-05-11 DIAGNOSIS — Z79.01 LONG TERM (CURRENT) USE OF ANTICOAGULANTS: ICD-10-CM

## 2020-05-11 DIAGNOSIS — Z98.890 OTHER SPECIFIED POSTPROCEDURAL STATES: Chronic | ICD-10-CM

## 2020-05-11 DIAGNOSIS — Z98.41 CATARACT EXTRACTION STATUS, RIGHT EYE: Chronic | ICD-10-CM

## 2020-05-11 LAB
POCT INR: 2.8 RATIO — HIGH (ref 0.9–1.2)
POCT PT: 33.8 SEC — HIGH (ref 10–13.4)

## 2020-05-15 ENCOUNTER — LABORATORY RESULT (OUTPATIENT)
Age: 70
End: 2020-05-15

## 2020-05-15 ENCOUNTER — APPOINTMENT (OUTPATIENT)
Dept: INFUSION THERAPY | Facility: CLINIC | Age: 70
End: 2020-05-15

## 2020-05-15 LAB
HCT VFR BLD CALC: 28.1 %
HGB BLD-MCNC: 9 G/DL
MCHC RBC-ENTMCNC: 30.7 PG
MCHC RBC-ENTMCNC: 32 G/DL
MCV RBC AUTO: 95.9 FL
PLATELET # BLD AUTO: 193 K/UL
PMV BLD: 10.1 FL
RBC # BLD: 2.93 M/UL
RBC # FLD: 16.1 %
WBC # FLD AUTO: 6.79 K/UL

## 2020-05-15 RX ORDER — ERYTHROPOIETIN 10000 [IU]/ML
30000 INJECTION, SOLUTION INTRAVENOUS; SUBCUTANEOUS ONCE
Refills: 0 | Status: COMPLETED | OUTPATIENT
Start: 2020-05-15 | End: 2020-05-15

## 2020-05-15 RX ADMIN — ERYTHROPOIETIN 30000 UNIT(S): 10000 INJECTION, SOLUTION INTRAVENOUS; SUBCUTANEOUS at 11:20

## 2020-05-19 ENCOUNTER — APPOINTMENT (OUTPATIENT)
Dept: HEMATOLOGY ONCOLOGY | Facility: CLINIC | Age: 70
End: 2020-05-19

## 2020-05-20 ENCOUNTER — APPOINTMENT (OUTPATIENT)
Dept: HEMATOLOGY ONCOLOGY | Facility: CLINIC | Age: 70
End: 2020-05-20

## 2020-05-20 ENCOUNTER — OUTPATIENT (OUTPATIENT)
Dept: OUTPATIENT SERVICES | Facility: HOSPITAL | Age: 70
LOS: 1 days | Discharge: HOME | End: 2020-05-20

## 2020-05-20 DIAGNOSIS — Z98.890 OTHER SPECIFIED POSTPROCEDURAL STATES: Chronic | ICD-10-CM

## 2020-05-20 DIAGNOSIS — Z11.59 ENCOUNTER FOR SCREENING FOR OTHER VIRAL DISEASES: ICD-10-CM

## 2020-05-20 DIAGNOSIS — Z98.41 CATARACT EXTRACTION STATUS, RIGHT EYE: Chronic | ICD-10-CM

## 2020-05-21 ENCOUNTER — APPOINTMENT (OUTPATIENT)
Dept: ENDOCRINOLOGY | Facility: CLINIC | Age: 70
End: 2020-05-21
Payer: MEDICARE

## 2020-05-21 LAB — SARS-COV-2 RNA SPEC QL NAA+PROBE: SIGNIFICANT CHANGE UP

## 2020-05-21 PROCEDURE — 99447 NTRPROF PH1/NTRNET/EHR 11-20: CPT

## 2020-05-22 ENCOUNTER — LABORATORY RESULT (OUTPATIENT)
Age: 70
End: 2020-05-22

## 2020-05-22 ENCOUNTER — OUTPATIENT (OUTPATIENT)
Dept: OUTPATIENT SERVICES | Facility: HOSPITAL | Age: 70
LOS: 1 days | Discharge: HOME | End: 2020-05-22

## 2020-05-22 ENCOUNTER — APPOINTMENT (OUTPATIENT)
Dept: INFUSION THERAPY | Facility: CLINIC | Age: 70
End: 2020-05-22

## 2020-05-22 DIAGNOSIS — I48.91 UNSPECIFIED ATRIAL FIBRILLATION: ICD-10-CM

## 2020-05-22 DIAGNOSIS — Z98.890 OTHER SPECIFIED POSTPROCEDURAL STATES: Chronic | ICD-10-CM

## 2020-05-22 DIAGNOSIS — Z79.01 LONG TERM (CURRENT) USE OF ANTICOAGULANTS: ICD-10-CM

## 2020-05-22 DIAGNOSIS — Z98.41 CATARACT EXTRACTION STATUS, RIGHT EYE: Chronic | ICD-10-CM

## 2020-05-22 LAB
HCT VFR BLD CALC: 28.3 %
HGB BLD-MCNC: 9 G/DL
MCHC RBC-ENTMCNC: 30.4 PG
MCHC RBC-ENTMCNC: 31.8 G/DL
MCV RBC AUTO: 95.6 FL
PLATELET # BLD AUTO: 172 K/UL
PMV BLD: 10.1 FL
POCT INR: 2.2 RATIO — HIGH (ref 0.9–1.2)
POCT PT: 26.8 SEC — HIGH (ref 10–13.4)
RBC # BLD: 2.96 M/UL
RBC # FLD: 15.6 %
WBC # FLD AUTO: 7.09 K/UL

## 2020-05-22 RX ORDER — ERYTHROPOIETIN 10000 [IU]/ML
30000 INJECTION, SOLUTION INTRAVENOUS; SUBCUTANEOUS ONCE
Refills: 0 | Status: COMPLETED | OUTPATIENT
Start: 2020-05-22 | End: 2020-05-22

## 2020-05-22 RX ADMIN — ERYTHROPOIETIN 30000 UNIT(S): 10000 INJECTION, SOLUTION INTRAVENOUS; SUBCUTANEOUS at 11:00

## 2020-05-29 ENCOUNTER — LABORATORY RESULT (OUTPATIENT)
Age: 70
End: 2020-05-29

## 2020-05-29 ENCOUNTER — APPOINTMENT (OUTPATIENT)
Dept: INFUSION THERAPY | Facility: CLINIC | Age: 70
End: 2020-05-29

## 2020-05-29 LAB
HCT VFR BLD CALC: 29.1 %
HGB BLD-MCNC: 9.6 G/DL
MCHC RBC-ENTMCNC: 31.1 PG
MCHC RBC-ENTMCNC: 33 G/DL
MCV RBC AUTO: 94.2 FL
PLATELET # BLD AUTO: 188 K/UL
PMV BLD: 10.5 FL
RBC # BLD: 3.09 M/UL
RBC # FLD: 15.1 %
WBC # FLD AUTO: 8.6 K/UL

## 2020-05-29 RX ORDER — ERYTHROPOIETIN 10000 [IU]/ML
30000 INJECTION, SOLUTION INTRAVENOUS; SUBCUTANEOUS ONCE
Refills: 0 | Status: COMPLETED | OUTPATIENT
Start: 2020-05-29 | End: 2020-05-29

## 2020-05-29 RX ADMIN — ERYTHROPOIETIN 30000 UNIT(S): 10000 INJECTION, SOLUTION INTRAVENOUS; SUBCUTANEOUS at 10:52

## 2020-05-30 ENCOUNTER — APPOINTMENT (OUTPATIENT)
Dept: ENDOCRINOLOGY | Facility: CLINIC | Age: 70
End: 2020-05-30

## 2020-06-03 ENCOUNTER — APPOINTMENT (OUTPATIENT)
Dept: HEMATOLOGY ONCOLOGY | Facility: CLINIC | Age: 70
End: 2020-06-03

## 2020-06-03 ENCOUNTER — OUTPATIENT (OUTPATIENT)
Dept: OUTPATIENT SERVICES | Facility: HOSPITAL | Age: 70
LOS: 1 days | Discharge: HOME | End: 2020-06-03

## 2020-06-03 DIAGNOSIS — Z98.41 CATARACT EXTRACTION STATUS, RIGHT EYE: Chronic | ICD-10-CM

## 2020-06-03 DIAGNOSIS — Z98.890 OTHER SPECIFIED POSTPROCEDURAL STATES: Chronic | ICD-10-CM

## 2020-06-04 LAB — SARS-COV-2 RNA SPEC QL NAA+PROBE: SIGNIFICANT CHANGE UP

## 2020-06-05 ENCOUNTER — OUTPATIENT (OUTPATIENT)
Dept: OUTPATIENT SERVICES | Facility: HOSPITAL | Age: 70
LOS: 1 days | Discharge: HOME | End: 2020-06-05

## 2020-06-05 ENCOUNTER — LABORATORY RESULT (OUTPATIENT)
Age: 70
End: 2020-06-05

## 2020-06-05 ENCOUNTER — APPOINTMENT (OUTPATIENT)
Dept: INFUSION THERAPY | Facility: CLINIC | Age: 70
End: 2020-06-05

## 2020-06-05 VITALS
SYSTOLIC BLOOD PRESSURE: 160 MMHG | DIASTOLIC BLOOD PRESSURE: 74 MMHG | TEMPERATURE: 98.4 F | RESPIRATION RATE: 18 BRPM | HEART RATE: 70 BPM

## 2020-06-05 DIAGNOSIS — I48.91 UNSPECIFIED ATRIAL FIBRILLATION: ICD-10-CM

## 2020-06-05 DIAGNOSIS — Z98.41 CATARACT EXTRACTION STATUS, RIGHT EYE: Chronic | ICD-10-CM

## 2020-06-05 DIAGNOSIS — Z79.01 LONG TERM (CURRENT) USE OF ANTICOAGULANTS: ICD-10-CM

## 2020-06-05 DIAGNOSIS — Z98.890 OTHER SPECIFIED POSTPROCEDURAL STATES: Chronic | ICD-10-CM

## 2020-06-05 LAB
HCT VFR BLD CALC: 30.5 %
HGB BLD-MCNC: 9.5 G/DL
INR PPP: 2.3 RATIO
MCHC RBC-ENTMCNC: 29.4 PG
MCHC RBC-ENTMCNC: 31.1 G/DL
MCV RBC AUTO: 94.4 FL
PLATELET # BLD AUTO: 202 K/UL
PMV BLD: 10.4 FL
POCT-PROTHROMBIN TIME: 27.1 SECS
QUALITY CONTROL: YES
RBC # BLD: 3.23 M/UL
RBC # FLD: 15 %
WBC # FLD AUTO: 7.82 K/UL

## 2020-06-05 RX ORDER — ERYTHROPOIETIN 10000 [IU]/ML
30000 INJECTION, SOLUTION INTRAVENOUS; SUBCUTANEOUS ONCE
Refills: 0 | Status: COMPLETED | OUTPATIENT
Start: 2020-06-05 | End: 2020-06-05

## 2020-06-05 RX ADMIN — ERYTHROPOIETIN 30000 UNIT(S): 10000 INJECTION, SOLUTION INTRAVENOUS; SUBCUTANEOUS at 10:42

## 2020-06-12 ENCOUNTER — APPOINTMENT (OUTPATIENT)
Dept: INFUSION THERAPY | Facility: CLINIC | Age: 70
End: 2020-06-12

## 2020-06-12 ENCOUNTER — LABORATORY RESULT (OUTPATIENT)
Age: 70
End: 2020-06-12

## 2020-06-12 ENCOUNTER — OUTPATIENT (OUTPATIENT)
Dept: OUTPATIENT SERVICES | Facility: HOSPITAL | Age: 70
LOS: 1 days | Discharge: HOME | End: 2020-06-12

## 2020-06-12 DIAGNOSIS — N18.9 CHRONIC KIDNEY DISEASE, UNSPECIFIED: ICD-10-CM

## 2020-06-12 DIAGNOSIS — D64.9 ANEMIA, UNSPECIFIED: ICD-10-CM

## 2020-06-12 DIAGNOSIS — D59.4 OTHER NONAUTOIMMUNE HEMOLYTIC ANEMIAS: ICD-10-CM

## 2020-06-12 DIAGNOSIS — Z98.890 OTHER SPECIFIED POSTPROCEDURAL STATES: Chronic | ICD-10-CM

## 2020-06-12 DIAGNOSIS — Z98.41 CATARACT EXTRACTION STATUS, RIGHT EYE: Chronic | ICD-10-CM

## 2020-06-12 LAB
HCT VFR BLD CALC: 30.1 %
HGB BLD-MCNC: 9.4 G/DL
MCHC RBC-ENTMCNC: 29 PG
MCHC RBC-ENTMCNC: 31.2 G/DL
MCV RBC AUTO: 92.9 FL
PLATELET # BLD AUTO: 210 K/UL
PMV BLD: 9.7 FL
RBC # BLD: 3.24 M/UL
RBC # FLD: 15.1 %
WBC # FLD AUTO: 8.52 K/UL

## 2020-06-12 RX ORDER — ERYTHROPOIETIN 10000 [IU]/ML
30000 INJECTION, SOLUTION INTRAVENOUS; SUBCUTANEOUS ONCE
Refills: 0 | Status: COMPLETED | OUTPATIENT
Start: 2020-06-12 | End: 2020-06-12

## 2020-06-12 RX ADMIN — ERYTHROPOIETIN 30000 UNIT(S): 10000 INJECTION, SOLUTION INTRAVENOUS; SUBCUTANEOUS at 11:24

## 2020-06-16 DIAGNOSIS — Z11.59 ENCOUNTER FOR SCREENING FOR OTHER VIRAL DISEASES: ICD-10-CM

## 2020-06-17 ENCOUNTER — LABORATORY RESULT (OUTPATIENT)
Age: 70
End: 2020-06-17

## 2020-06-17 ENCOUNTER — APPOINTMENT (OUTPATIENT)
Dept: HEMATOLOGY ONCOLOGY | Facility: CLINIC | Age: 70
End: 2020-06-17

## 2020-06-19 ENCOUNTER — LABORATORY RESULT (OUTPATIENT)
Age: 70
End: 2020-06-19

## 2020-06-19 ENCOUNTER — APPOINTMENT (OUTPATIENT)
Dept: INFUSION THERAPY | Facility: CLINIC | Age: 70
End: 2020-06-19

## 2020-06-19 LAB
HCT VFR BLD CALC: 32.3 %
HGB BLD-MCNC: 10.4 G/DL
MCHC RBC-ENTMCNC: 29.2 PG
MCHC RBC-ENTMCNC: 32.2 G/DL
MCV RBC AUTO: 90.7 FL
PLATELET # BLD AUTO: 196 K/UL
PMV BLD: 10.2 FL
RBC # BLD: 3.56 M/UL
RBC # FLD: 14.8 %
WBC # FLD AUTO: 7.24 K/UL

## 2020-06-19 RX ORDER — ERYTHROPOIETIN 10000 [IU]/ML
30000 INJECTION, SOLUTION INTRAVENOUS; SUBCUTANEOUS ONCE
Refills: 0 | Status: COMPLETED | OUTPATIENT
Start: 2020-06-19 | End: 2020-06-19

## 2020-06-19 RX ADMIN — ERYTHROPOIETIN 30000 UNIT(S): 10000 INJECTION, SOLUTION INTRAVENOUS; SUBCUTANEOUS at 11:37

## 2020-06-26 ENCOUNTER — OUTPATIENT (OUTPATIENT)
Dept: OUTPATIENT SERVICES | Facility: HOSPITAL | Age: 70
LOS: 1 days | Discharge: HOME | End: 2020-06-26

## 2020-06-26 ENCOUNTER — APPOINTMENT (OUTPATIENT)
Dept: INFUSION THERAPY | Facility: CLINIC | Age: 70
End: 2020-06-26

## 2020-06-26 ENCOUNTER — LABORATORY RESULT (OUTPATIENT)
Age: 70
End: 2020-06-26

## 2020-06-26 DIAGNOSIS — Z98.41 CATARACT EXTRACTION STATUS, RIGHT EYE: Chronic | ICD-10-CM

## 2020-06-26 DIAGNOSIS — Z79.01 LONG TERM (CURRENT) USE OF ANTICOAGULANTS: ICD-10-CM

## 2020-06-26 DIAGNOSIS — I48.91 UNSPECIFIED ATRIAL FIBRILLATION: ICD-10-CM

## 2020-06-26 DIAGNOSIS — Z98.890 OTHER SPECIFIED POSTPROCEDURAL STATES: Chronic | ICD-10-CM

## 2020-06-26 LAB
HCT VFR BLD CALC: 31.4 %
HGB BLD-MCNC: 10 G/DL
INR PPP: 2.3 RATIO
MCHC RBC-ENTMCNC: 29.1 PG
MCHC RBC-ENTMCNC: 31.8 G/DL
MCV RBC AUTO: 91.3 FL
PLATELET # BLD AUTO: 199 K/UL
PMV BLD: 10.3 FL
POCT INR: 2.3 RATIO — HIGH (ref 0.9–1.2)
POCT PT: 27.3 SEC — HIGH (ref 10–13.4)
POCT-PROTHROMBIN TIME: 27.3 SECS
RBC # BLD: 3.44 M/UL
RBC # FLD: 15.1 %
WBC # FLD AUTO: 7.73 K/UL

## 2020-06-26 RX ORDER — ERYTHROPOIETIN 10000 [IU]/ML
30000 INJECTION, SOLUTION INTRAVENOUS; SUBCUTANEOUS ONCE
Refills: 0 | Status: COMPLETED | OUTPATIENT
Start: 2020-06-26 | End: 2020-06-26

## 2020-06-26 RX ADMIN — ERYTHROPOIETIN 30000 UNIT(S): 10000 INJECTION, SOLUTION INTRAVENOUS; SUBCUTANEOUS at 11:20

## 2020-06-30 ENCOUNTER — APPOINTMENT (OUTPATIENT)
Dept: HEMATOLOGY ONCOLOGY | Facility: CLINIC | Age: 70
End: 2020-06-30

## 2020-07-02 ENCOUNTER — LABORATORY RESULT (OUTPATIENT)
Age: 70
End: 2020-07-02

## 2020-07-02 ENCOUNTER — APPOINTMENT (OUTPATIENT)
Dept: INFUSION THERAPY | Facility: CLINIC | Age: 70
End: 2020-07-02

## 2020-07-02 LAB
HCT VFR BLD CALC: 31.7 %
HGB BLD-MCNC: 9.8 G/DL
MCHC RBC-ENTMCNC: 28.4 PG
MCHC RBC-ENTMCNC: 30.9 G/DL
MCV RBC AUTO: 91.9 FL
PLATELET # BLD AUTO: 187 K/UL
PMV BLD: 10.1 FL
RBC # BLD: 3.45 M/UL
RBC # FLD: 15.6 %
WBC # FLD AUTO: 7.6 K/UL

## 2020-07-02 RX ORDER — ERYTHROPOIETIN 10000 [IU]/ML
30000 INJECTION, SOLUTION INTRAVENOUS; SUBCUTANEOUS ONCE
Refills: 0 | Status: COMPLETED | OUTPATIENT
Start: 2020-07-02 | End: 2020-07-02

## 2020-07-02 RX ADMIN — ERYTHROPOIETIN 30000 UNIT(S): 10000 INJECTION, SOLUTION INTRAVENOUS; SUBCUTANEOUS at 12:52

## 2020-07-10 ENCOUNTER — LABORATORY RESULT (OUTPATIENT)
Age: 70
End: 2020-07-10

## 2020-07-10 ENCOUNTER — APPOINTMENT (OUTPATIENT)
Dept: INFUSION THERAPY | Facility: CLINIC | Age: 70
End: 2020-07-10

## 2020-07-10 LAB
HCT VFR BLD CALC: 31.9 %
HGB BLD-MCNC: 10.1 G/DL
MCHC RBC-ENTMCNC: 28.9 PG
MCHC RBC-ENTMCNC: 31.7 G/DL
MCV RBC AUTO: 91.1 FL
PLATELET # BLD AUTO: 174 K/UL
PMV BLD: 10.8 FL
RBC # BLD: 3.5 M/UL
RBC # FLD: 15.8 %
WBC # FLD AUTO: 7.19 K/UL

## 2020-07-10 RX ORDER — ERYTHROPOIETIN 10000 [IU]/ML
30000 INJECTION, SOLUTION INTRAVENOUS; SUBCUTANEOUS ONCE
Refills: 0 | Status: COMPLETED | OUTPATIENT
Start: 2020-07-10 | End: 2020-07-10

## 2020-07-10 RX ADMIN — ERYTHROPOIETIN 30000 UNIT(S): 10000 INJECTION, SOLUTION INTRAVENOUS; SUBCUTANEOUS at 11:02

## 2020-07-17 ENCOUNTER — LABORATORY RESULT (OUTPATIENT)
Age: 70
End: 2020-07-17

## 2020-07-17 ENCOUNTER — OUTPATIENT (OUTPATIENT)
Dept: OUTPATIENT SERVICES | Facility: HOSPITAL | Age: 70
LOS: 1 days | Discharge: HOME | End: 2020-07-17

## 2020-07-17 ENCOUNTER — APPOINTMENT (OUTPATIENT)
Dept: INFUSION THERAPY | Facility: CLINIC | Age: 70
End: 2020-07-17

## 2020-07-17 DIAGNOSIS — Z98.890 OTHER SPECIFIED POSTPROCEDURAL STATES: Chronic | ICD-10-CM

## 2020-07-17 DIAGNOSIS — Z79.01 LONG TERM (CURRENT) USE OF ANTICOAGULANTS: ICD-10-CM

## 2020-07-17 DIAGNOSIS — Z11.59 ENCOUNTER FOR SCREENING FOR OTHER VIRAL DISEASES: ICD-10-CM

## 2020-07-17 DIAGNOSIS — Z98.41 CATARACT EXTRACTION STATUS, RIGHT EYE: Chronic | ICD-10-CM

## 2020-07-17 DIAGNOSIS — I48.91 UNSPECIFIED ATRIAL FIBRILLATION: ICD-10-CM

## 2020-07-17 LAB
HCT VFR BLD CALC: 33.6 %
HGB BLD-MCNC: 10.7 G/DL
INR PPP: 2.6 RATIO
MCHC RBC-ENTMCNC: 28.9 PG
MCHC RBC-ENTMCNC: 31.8 G/DL
MCV RBC AUTO: 90.8 FL
PLATELET # BLD AUTO: 187 K/UL
PMV BLD: 10.1 FL
POCT INR: 2.6 RATIO — HIGH (ref 0.9–1.2)
POCT PT: 31.6 SEC — HIGH (ref 10–13.4)
POCT-PROTHROMBIN TIME: 31.6 SECS
RBC # BLD: 3.7 M/UL
RBC # FLD: 16.3 %
WBC # FLD AUTO: 7.46 K/UL

## 2020-07-20 DIAGNOSIS — Z02.9 ENCOUNTER FOR ADMINISTRATIVE EXAMINATIONS, UNSPECIFIED: ICD-10-CM

## 2020-07-24 ENCOUNTER — APPOINTMENT (OUTPATIENT)
Dept: INFUSION THERAPY | Facility: CLINIC | Age: 70
End: 2020-07-24

## 2020-07-24 ENCOUNTER — LABORATORY RESULT (OUTPATIENT)
Age: 70
End: 2020-07-24

## 2020-07-24 LAB
HCT VFR BLD CALC: 33.3 %
HGB BLD-MCNC: 10.5 G/DL
MCHC RBC-ENTMCNC: 28.6 PG
MCHC RBC-ENTMCNC: 31.5 G/DL
MCV RBC AUTO: 90.7 FL
PLATELET # BLD AUTO: 164 K/UL
PMV BLD: 10.3 FL
RBC # BLD: 3.67 M/UL
RBC # FLD: 15.7 %
WBC # FLD AUTO: 7.93 K/UL

## 2020-07-30 ENCOUNTER — RECORD ABSTRACTING (OUTPATIENT)
Age: 70
End: 2020-07-30

## 2020-07-30 DIAGNOSIS — I48.91 UNSPECIFIED ATRIAL FIBRILLATION: ICD-10-CM

## 2020-07-30 RX ORDER — SODIUM BICARBONATE 650 MG/1
TABLET ORAL
Refills: 0 | Status: ACTIVE | COMMUNITY

## 2020-07-31 ENCOUNTER — APPOINTMENT (OUTPATIENT)
Dept: INFUSION THERAPY | Facility: CLINIC | Age: 70
End: 2020-07-31

## 2020-07-31 ENCOUNTER — LABORATORY RESULT (OUTPATIENT)
Age: 70
End: 2020-07-31

## 2020-07-31 LAB
HCT VFR BLD CALC: 30.4 %
HGB BLD-MCNC: 9.9 G/DL
MCHC RBC-ENTMCNC: 29.2 PG
MCHC RBC-ENTMCNC: 32.6 G/DL
MCV RBC AUTO: 89.7 FL
PLATELET # BLD AUTO: 150 K/UL
PMV BLD: 10.6 FL
RBC # BLD: 3.39 M/UL
RBC # FLD: 15.5 %
WBC # FLD AUTO: 7.16 K/UL

## 2020-07-31 RX ORDER — ERYTHROPOIETIN 10000 [IU]/ML
30000 INJECTION, SOLUTION INTRAVENOUS; SUBCUTANEOUS ONCE
Refills: 0 | Status: COMPLETED | OUTPATIENT
Start: 2020-07-31 | End: 2020-07-31

## 2020-07-31 RX ADMIN — ERYTHROPOIETIN 30000 UNIT(S): 10000 INJECTION, SOLUTION INTRAVENOUS; SUBCUTANEOUS at 11:15

## 2020-08-04 ENCOUNTER — OUTPATIENT (OUTPATIENT)
Dept: OUTPATIENT SERVICES | Facility: HOSPITAL | Age: 70
LOS: 1 days | Discharge: HOME | End: 2020-08-04

## 2020-08-04 ENCOUNTER — LABORATORY RESULT (OUTPATIENT)
Age: 70
End: 2020-08-04

## 2020-08-04 DIAGNOSIS — Z98.41 CATARACT EXTRACTION STATUS, RIGHT EYE: Chronic | ICD-10-CM

## 2020-08-04 DIAGNOSIS — Z11.59 ENCOUNTER FOR SCREENING FOR OTHER VIRAL DISEASES: ICD-10-CM

## 2020-08-04 DIAGNOSIS — Z98.890 OTHER SPECIFIED POSTPROCEDURAL STATES: Chronic | ICD-10-CM

## 2020-08-07 ENCOUNTER — APPOINTMENT (OUTPATIENT)
Dept: INFUSION THERAPY | Facility: CLINIC | Age: 70
End: 2020-08-07

## 2020-08-07 ENCOUNTER — LABORATORY RESULT (OUTPATIENT)
Age: 70
End: 2020-08-07

## 2020-08-07 LAB
HCT VFR BLD CALC: 29.7 %
HGB BLD-MCNC: 9.6 G/DL
MCHC RBC-ENTMCNC: 29.3 PG
MCHC RBC-ENTMCNC: 32.3 G/DL
MCV RBC AUTO: 90.5 FL
PLATELET # BLD AUTO: 174 K/UL
PMV BLD: 10.4 FL
RBC # BLD: 3.28 M/UL
RBC # FLD: 16 %
WBC # FLD AUTO: 7.96 K/UL

## 2020-08-07 RX ORDER — ERYTHROPOIETIN 10000 [IU]/ML
30000 INJECTION, SOLUTION INTRAVENOUS; SUBCUTANEOUS ONCE
Refills: 0 | Status: COMPLETED | OUTPATIENT
Start: 2020-08-07 | End: 2020-08-07

## 2020-08-07 RX ADMIN — ERYTHROPOIETIN 30000 UNIT(S): 10000 INJECTION, SOLUTION INTRAVENOUS; SUBCUTANEOUS at 11:28

## 2020-08-14 ENCOUNTER — OUTPATIENT (OUTPATIENT)
Dept: OUTPATIENT SERVICES | Facility: HOSPITAL | Age: 70
LOS: 1 days | Discharge: HOME | End: 2020-08-14

## 2020-08-14 ENCOUNTER — APPOINTMENT (OUTPATIENT)
Dept: MEDICATION MANAGEMENT | Facility: CLINIC | Age: 70
End: 2020-08-14

## 2020-08-14 ENCOUNTER — APPOINTMENT (OUTPATIENT)
Dept: INFUSION THERAPY | Facility: CLINIC | Age: 70
End: 2020-08-14

## 2020-08-14 ENCOUNTER — LABORATORY RESULT (OUTPATIENT)
Age: 70
End: 2020-08-14

## 2020-08-14 VITALS — HEART RATE: 78 BPM | RESPIRATION RATE: 19 BRPM | OXYGEN SATURATION: 98 %

## 2020-08-14 DIAGNOSIS — Z79.01 LONG TERM (CURRENT) USE OF ANTICOAGULANTS: ICD-10-CM

## 2020-08-14 DIAGNOSIS — I48.91 UNSPECIFIED ATRIAL FIBRILLATION: ICD-10-CM

## 2020-08-14 DIAGNOSIS — Z98.890 OTHER SPECIFIED POSTPROCEDURAL STATES: Chronic | ICD-10-CM

## 2020-08-14 DIAGNOSIS — Z98.41 CATARACT EXTRACTION STATUS, RIGHT EYE: Chronic | ICD-10-CM

## 2020-08-14 LAB
HCT VFR BLD CALC: 30.1 %
HGB BLD-MCNC: 9.7 G/DL
INR PPP: 2 RATIO
MCHC RBC-ENTMCNC: 29.5 PG
MCHC RBC-ENTMCNC: 32.2 G/DL
MCV RBC AUTO: 91.5 FL
PLATELET # BLD AUTO: 159 K/UL
PMV BLD: 10.2 FL
POCT-PROTHROMBIN TIME: 25 SECS
QUALITY CONTROL: YES
RBC # BLD: 3.29 M/UL
RBC # FLD: 17.6 %
WBC # FLD AUTO: 7.88 K/UL

## 2020-08-14 RX ORDER — ERYTHROPOIETIN 10000 [IU]/ML
30000 INJECTION, SOLUTION INTRAVENOUS; SUBCUTANEOUS ONCE
Refills: 0 | Status: COMPLETED | OUTPATIENT
Start: 2020-08-14 | End: 2020-08-14

## 2020-08-14 RX ADMIN — ERYTHROPOIETIN 30000 UNIT(S): 10000 INJECTION, SOLUTION INTRAVENOUS; SUBCUTANEOUS at 10:50

## 2020-08-19 ENCOUNTER — LABORATORY RESULT (OUTPATIENT)
Age: 70
End: 2020-08-19

## 2020-08-21 ENCOUNTER — LABORATORY RESULT (OUTPATIENT)
Age: 70
End: 2020-08-21

## 2020-08-21 ENCOUNTER — APPOINTMENT (OUTPATIENT)
Dept: INFUSION THERAPY | Facility: CLINIC | Age: 70
End: 2020-08-21

## 2020-08-21 LAB
HCT VFR BLD CALC: 30.1 %
HGB BLD-MCNC: 9.5 G/DL
MCHC RBC-ENTMCNC: 29.6 PG
MCHC RBC-ENTMCNC: 31.6 G/DL
MCV RBC AUTO: 93.8 FL
PLATELET # BLD AUTO: 143 K/UL
PMV BLD: 10.3 FL
RBC # BLD: 3.21 M/UL
RBC # FLD: 18.1 %
WBC # FLD AUTO: 7 K/UL

## 2020-08-21 RX ORDER — ERYTHROPOIETIN 10000 [IU]/ML
30000 INJECTION, SOLUTION INTRAVENOUS; SUBCUTANEOUS ONCE
Refills: 0 | Status: COMPLETED | OUTPATIENT
Start: 2020-08-21 | End: 2020-08-21

## 2020-08-21 RX ADMIN — ERYTHROPOIETIN 30000 UNIT(S): 10000 INJECTION, SOLUTION INTRAVENOUS; SUBCUTANEOUS at 11:32

## 2020-08-25 ENCOUNTER — OUTPATIENT (OUTPATIENT)
Dept: OUTPATIENT SERVICES | Facility: HOSPITAL | Age: 70
LOS: 1 days | Discharge: HOME | End: 2020-08-25

## 2020-08-25 ENCOUNTER — LABORATORY RESULT (OUTPATIENT)
Age: 70
End: 2020-08-25

## 2020-08-25 DIAGNOSIS — Z11.59 ENCOUNTER FOR SCREENING FOR OTHER VIRAL DISEASES: ICD-10-CM

## 2020-08-25 DIAGNOSIS — Z98.41 CATARACT EXTRACTION STATUS, RIGHT EYE: Chronic | ICD-10-CM

## 2020-08-25 DIAGNOSIS — Z98.890 OTHER SPECIFIED POSTPROCEDURAL STATES: Chronic | ICD-10-CM

## 2020-08-28 ENCOUNTER — APPOINTMENT (OUTPATIENT)
Dept: INFUSION THERAPY | Facility: CLINIC | Age: 70
End: 2020-08-28

## 2020-08-28 ENCOUNTER — LABORATORY RESULT (OUTPATIENT)
Age: 70
End: 2020-08-28

## 2020-08-28 RX ORDER — ERYTHROPOIETIN 10000 [IU]/ML
30000 INJECTION, SOLUTION INTRAVENOUS; SUBCUTANEOUS ONCE
Refills: 0 | Status: COMPLETED | OUTPATIENT
Start: 2020-08-28 | End: 2020-08-28

## 2020-08-28 RX ADMIN — ERYTHROPOIETIN 30000 UNIT(S): 10000 INJECTION, SOLUTION INTRAVENOUS; SUBCUTANEOUS at 11:14

## 2020-09-04 ENCOUNTER — LABORATORY RESULT (OUTPATIENT)
Age: 70
End: 2020-09-04

## 2020-09-04 ENCOUNTER — APPOINTMENT (OUTPATIENT)
Dept: INFUSION THERAPY | Facility: CLINIC | Age: 70
End: 2020-09-04

## 2020-09-04 ENCOUNTER — APPOINTMENT (OUTPATIENT)
Dept: MEDICATION MANAGEMENT | Facility: CLINIC | Age: 70
End: 2020-09-04

## 2020-09-04 RX ORDER — ERYTHROPOIETIN 10000 [IU]/ML
30000 INJECTION, SOLUTION INTRAVENOUS; SUBCUTANEOUS ONCE
Refills: 0 | Status: COMPLETED | OUTPATIENT
Start: 2020-09-04 | End: 2020-09-04

## 2020-09-04 RX ADMIN — ERYTHROPOIETIN 30000 UNIT(S): 10000 INJECTION, SOLUTION INTRAVENOUS; SUBCUTANEOUS at 11:30

## 2020-09-08 LAB
HCT VFR BLD CALC: 31.4 %
HCT VFR BLD CALC: 31.8 %
HGB BLD-MCNC: 10 G/DL
HGB BLD-MCNC: 10.3 G/DL
MCHC RBC-ENTMCNC: 29.9 PG
MCHC RBC-ENTMCNC: 30.1 PG
MCHC RBC-ENTMCNC: 31.8 G/DL
MCHC RBC-ENTMCNC: 32.4 G/DL
MCV RBC AUTO: 93 FL
MCV RBC AUTO: 93.7 FL
PLATELET # BLD AUTO: 162 K/UL
PLATELET # BLD AUTO: 180 K/UL
PMV BLD: 10.1 FL
PMV BLD: 10.4 FL
RBC # BLD: 3.35 M/UL
RBC # BLD: 3.42 M/UL
RBC # FLD: 17.8 %
RBC # FLD: 18 %
WBC # FLD AUTO: 6.51 K/UL
WBC # FLD AUTO: 6.95 K/UL

## 2020-09-11 ENCOUNTER — APPOINTMENT (OUTPATIENT)
Dept: MEDICATION MANAGEMENT | Facility: CLINIC | Age: 70
End: 2020-09-11

## 2020-09-11 ENCOUNTER — OUTPATIENT (OUTPATIENT)
Dept: OUTPATIENT SERVICES | Facility: HOSPITAL | Age: 70
LOS: 1 days | Discharge: HOME | End: 2020-09-11

## 2020-09-11 ENCOUNTER — APPOINTMENT (OUTPATIENT)
Dept: INFUSION THERAPY | Facility: CLINIC | Age: 70
End: 2020-09-11

## 2020-09-11 ENCOUNTER — LABORATORY RESULT (OUTPATIENT)
Age: 70
End: 2020-09-11

## 2020-09-11 VITALS — WEIGHT: 260 LBS | BODY MASS INDEX: 38.51 KG/M2 | HEIGHT: 69 IN | TEMPERATURE: 97.5 F

## 2020-09-11 DIAGNOSIS — Z98.890 OTHER SPECIFIED POSTPROCEDURAL STATES: Chronic | ICD-10-CM

## 2020-09-11 DIAGNOSIS — Z79.01 LONG TERM (CURRENT) USE OF ANTICOAGULANTS: ICD-10-CM

## 2020-09-11 DIAGNOSIS — I48.91 UNSPECIFIED ATRIAL FIBRILLATION: ICD-10-CM

## 2020-09-11 DIAGNOSIS — Z98.41 CATARACT EXTRACTION STATUS, RIGHT EYE: Chronic | ICD-10-CM

## 2020-09-11 LAB
HCT VFR BLD CALC: 32.5 %
HGB BLD-MCNC: 10.6 G/DL
INR PPP: 2.3 RATIO
MCHC RBC-ENTMCNC: 30.6 PG
MCHC RBC-ENTMCNC: 32.6 G/DL
MCV RBC AUTO: 93.9 FL
PLATELET # BLD AUTO: 167 K/UL
PMV BLD: 10.5 FL
POCT-PROTHROMBIN TIME: 27.6 SECS
QUALITY CONTROL: YES
RBC # BLD: 3.46 M/UL
RBC # FLD: 17.2 %
WBC # FLD AUTO: 6.66 K/UL

## 2020-09-18 ENCOUNTER — APPOINTMENT (OUTPATIENT)
Dept: INFUSION THERAPY | Facility: CLINIC | Age: 70
End: 2020-09-18

## 2020-09-18 ENCOUNTER — OUTPATIENT (OUTPATIENT)
Dept: OUTPATIENT SERVICES | Facility: HOSPITAL | Age: 70
LOS: 1 days | Discharge: HOME | End: 2020-09-18

## 2020-09-18 ENCOUNTER — LABORATORY RESULT (OUTPATIENT)
Age: 70
End: 2020-09-18

## 2020-09-18 DIAGNOSIS — D64.9 ANEMIA, UNSPECIFIED: ICD-10-CM

## 2020-09-18 DIAGNOSIS — Z98.41 CATARACT EXTRACTION STATUS, RIGHT EYE: Chronic | ICD-10-CM

## 2020-09-18 DIAGNOSIS — Z11.59 ENCOUNTER FOR SCREENING FOR OTHER VIRAL DISEASES: ICD-10-CM

## 2020-09-18 DIAGNOSIS — Z98.890 OTHER SPECIFIED POSTPROCEDURAL STATES: Chronic | ICD-10-CM

## 2020-09-18 LAB
HCT VFR BLD CALC: 30.7 %
HGB BLD-MCNC: 10 G/DL
MCHC RBC-ENTMCNC: 30.8 PG
MCHC RBC-ENTMCNC: 32.6 G/DL
MCV RBC AUTO: 94.5 FL
PLATELET # BLD AUTO: 140 K/UL
PMV BLD: 10.3 FL
RBC # BLD: 3.25 M/UL
RBC # FLD: 16 %
WBC # FLD AUTO: 6.84 K/UL

## 2020-09-18 RX ORDER — ERYTHROPOIETIN 10000 [IU]/ML
30000 INJECTION, SOLUTION INTRAVENOUS; SUBCUTANEOUS ONCE
Refills: 0 | Status: COMPLETED | OUTPATIENT
Start: 2020-09-18 | End: 2020-09-18

## 2020-09-18 RX ADMIN — ERYTHROPOIETIN 30000 UNIT(S): 10000 INJECTION, SOLUTION INTRAVENOUS; SUBCUTANEOUS at 11:02

## 2020-09-25 ENCOUNTER — APPOINTMENT (OUTPATIENT)
Dept: INFUSION THERAPY | Facility: CLINIC | Age: 70
End: 2020-09-25

## 2020-09-25 ENCOUNTER — LABORATORY RESULT (OUTPATIENT)
Age: 70
End: 2020-09-25

## 2020-09-25 LAB
HCT VFR BLD CALC: 29.8 %
HGB BLD-MCNC: 9.7 G/DL
MCHC RBC-ENTMCNC: 30.5 PG
MCHC RBC-ENTMCNC: 32.6 G/DL
MCV RBC AUTO: 93.7 FL
PLATELET # BLD AUTO: 191 K/UL
PMV BLD: 10.5 FL
RBC # BLD: 3.18 M/UL
RBC # FLD: 15.9 %
WBC # FLD AUTO: 6.53 K/UL

## 2020-09-25 RX ORDER — ERYTHROPOIETIN 10000 [IU]/ML
30000 INJECTION, SOLUTION INTRAVENOUS; SUBCUTANEOUS ONCE
Refills: 0 | Status: COMPLETED | OUTPATIENT
Start: 2020-09-25 | End: 2020-09-25

## 2020-09-25 RX ADMIN — ERYTHROPOIETIN 30000 UNIT(S): 10000 INJECTION, SOLUTION INTRAVENOUS; SUBCUTANEOUS at 11:24

## 2020-10-02 ENCOUNTER — APPOINTMENT (OUTPATIENT)
Dept: INFUSION THERAPY | Facility: CLINIC | Age: 70
End: 2020-10-02

## 2020-10-02 ENCOUNTER — LABORATORY RESULT (OUTPATIENT)
Age: 70
End: 2020-10-02

## 2020-10-02 LAB
HCT VFR BLD CALC: 32.9 %
HGB BLD-MCNC: 10.7 G/DL
MCHC RBC-ENTMCNC: 30.9 PG
MCHC RBC-ENTMCNC: 32.5 G/DL
MCV RBC AUTO: 95.1 FL
PLATELET # BLD AUTO: 192 K/UL
PMV BLD: 10.4 FL
RBC # BLD: 3.46 M/UL
RBC # FLD: 16 %
WBC # FLD AUTO: 6.78 K/UL

## 2020-10-09 ENCOUNTER — LABORATORY RESULT (OUTPATIENT)
Age: 70
End: 2020-10-09

## 2020-10-09 ENCOUNTER — APPOINTMENT (OUTPATIENT)
Dept: MEDICATION MANAGEMENT | Facility: CLINIC | Age: 70
End: 2020-10-09

## 2020-10-09 ENCOUNTER — OUTPATIENT (OUTPATIENT)
Dept: OUTPATIENT SERVICES | Facility: HOSPITAL | Age: 70
LOS: 1 days | Discharge: HOME | End: 2020-10-09

## 2020-10-09 ENCOUNTER — APPOINTMENT (OUTPATIENT)
Dept: INFUSION THERAPY | Facility: CLINIC | Age: 70
End: 2020-10-09

## 2020-10-09 VITALS — HEART RATE: 78 BPM | RESPIRATION RATE: 19 BRPM | OXYGEN SATURATION: 98 %

## 2020-10-09 DIAGNOSIS — I48.91 UNSPECIFIED ATRIAL FIBRILLATION: ICD-10-CM

## 2020-10-09 DIAGNOSIS — Z98.41 CATARACT EXTRACTION STATUS, RIGHT EYE: Chronic | ICD-10-CM

## 2020-10-09 DIAGNOSIS — Z79.01 LONG TERM (CURRENT) USE OF ANTICOAGULANTS: ICD-10-CM

## 2020-10-09 DIAGNOSIS — Z98.890 OTHER SPECIFIED POSTPROCEDURAL STATES: Chronic | ICD-10-CM

## 2020-10-09 LAB
HCT VFR BLD CALC: 32 %
HGB BLD-MCNC: 10.6 G/DL
INR PPP: 3.3 RATIO
MCHC RBC-ENTMCNC: 30.7 PG
MCHC RBC-ENTMCNC: 33.1 G/DL
MCV RBC AUTO: 92.8 FL
PLATELET # BLD AUTO: 140 K/UL
PMV BLD: 12 FL
POCT-PROTHROMBIN TIME: 40.2 SECS
QUALITY CONTROL: YES
RBC # BLD: 3.45 M/UL
RBC # FLD: 14.1 %
WBC # FLD AUTO: 9.94 K/UL

## 2020-10-16 ENCOUNTER — LABORATORY RESULT (OUTPATIENT)
Age: 70
End: 2020-10-16

## 2020-10-16 ENCOUNTER — APPOINTMENT (OUTPATIENT)
Dept: INFUSION THERAPY | Facility: CLINIC | Age: 70
End: 2020-10-16

## 2020-10-16 LAB
HCT VFR BLD CALC: 29.7 %
HGB BLD-MCNC: 9.8 G/DL
MCHC RBC-ENTMCNC: 30.6 PG
MCHC RBC-ENTMCNC: 33 G/DL
MCV RBC AUTO: 92.8 FL
PLATELET # BLD AUTO: 206 K/UL
PMV BLD: 11.1 FL
RBC # BLD: 3.2 M/UL
RBC # FLD: 13.4 %
WBC # FLD AUTO: 10.2 K/UL

## 2020-10-16 RX ORDER — ERYTHROPOIETIN 10000 [IU]/ML
30000 INJECTION, SOLUTION INTRAVENOUS; SUBCUTANEOUS ONCE
Refills: 0 | Status: COMPLETED | OUTPATIENT
Start: 2020-10-16 | End: 2020-10-16

## 2020-10-16 RX ADMIN — ERYTHROPOIETIN 30000 UNIT(S): 10000 INJECTION, SOLUTION INTRAVENOUS; SUBCUTANEOUS at 12:57

## 2020-10-23 ENCOUNTER — LABORATORY RESULT (OUTPATIENT)
Age: 70
End: 2020-10-23

## 2020-10-23 ENCOUNTER — APPOINTMENT (OUTPATIENT)
Dept: MEDICATION MANAGEMENT | Facility: CLINIC | Age: 70
End: 2020-10-23

## 2020-10-23 ENCOUNTER — APPOINTMENT (OUTPATIENT)
Dept: INFUSION THERAPY | Facility: CLINIC | Age: 70
End: 2020-10-23

## 2020-10-23 ENCOUNTER — OUTPATIENT (OUTPATIENT)
Dept: OUTPATIENT SERVICES | Facility: HOSPITAL | Age: 70
LOS: 1 days | Discharge: HOME | End: 2020-10-23

## 2020-10-23 DIAGNOSIS — Z98.890 OTHER SPECIFIED POSTPROCEDURAL STATES: Chronic | ICD-10-CM

## 2020-10-23 DIAGNOSIS — I48.91 UNSPECIFIED ATRIAL FIBRILLATION: ICD-10-CM

## 2020-10-23 DIAGNOSIS — Z98.41 CATARACT EXTRACTION STATUS, RIGHT EYE: Chronic | ICD-10-CM

## 2020-10-23 DIAGNOSIS — Z79.01 LONG TERM (CURRENT) USE OF ANTICOAGULANTS: ICD-10-CM

## 2020-10-23 LAB
HCT VFR BLD CALC: 28.5 %
HGB BLD-MCNC: 9.1 G/DL
INR PPP: 2.1 RATIO
MCHC RBC-ENTMCNC: 30.2 PG
MCHC RBC-ENTMCNC: 31.9 G/DL
MCV RBC AUTO: 94.7 FL
PLATELET # BLD AUTO: 234 K/UL
PMV BLD: 10.6 FL
POCT-PROTHROMBIN TIME: 25.2 SECS
QUALITY CONTROL: YES
RBC # BLD: 3.01 M/UL
RBC # FLD: 14 %
WBC # FLD AUTO: 8.45 K/UL

## 2020-10-23 RX ORDER — ERYTHROPOIETIN 10000 [IU]/ML
30000 INJECTION, SOLUTION INTRAVENOUS; SUBCUTANEOUS ONCE
Refills: 0 | Status: COMPLETED | OUTPATIENT
Start: 2020-10-23 | End: 2020-10-23

## 2020-10-23 RX ADMIN — ERYTHROPOIETIN 30000 UNIT(S): 10000 INJECTION, SOLUTION INTRAVENOUS; SUBCUTANEOUS at 11:02

## 2020-10-30 ENCOUNTER — LABORATORY RESULT (OUTPATIENT)
Age: 70
End: 2020-10-30

## 2020-10-30 ENCOUNTER — APPOINTMENT (OUTPATIENT)
Dept: INFUSION THERAPY | Facility: CLINIC | Age: 70
End: 2020-10-30

## 2020-10-30 LAB
HCT VFR BLD CALC: 28.4 %
HGB BLD-MCNC: 9.3 G/DL
MCHC RBC-ENTMCNC: 30.5 PG
MCHC RBC-ENTMCNC: 32.7 G/DL
MCV RBC AUTO: 93.1 FL
PLATELET # BLD AUTO: 179 K/UL
PMV BLD: 10.4 FL
RBC # BLD: 3.05 M/UL
RBC # FLD: 14.1 %
WBC # FLD AUTO: 8.04 K/UL

## 2020-10-30 RX ORDER — ERYTHROPOIETIN 10000 [IU]/ML
30000 INJECTION, SOLUTION INTRAVENOUS; SUBCUTANEOUS ONCE
Refills: 0 | Status: COMPLETED | OUTPATIENT
Start: 2020-10-30 | End: 2020-10-30

## 2020-10-30 RX ADMIN — ERYTHROPOIETIN 30000 UNIT(S): 10000 INJECTION, SOLUTION INTRAVENOUS; SUBCUTANEOUS at 11:29

## 2020-11-05 ENCOUNTER — APPOINTMENT (OUTPATIENT)
Dept: HEMATOLOGY ONCOLOGY | Facility: CLINIC | Age: 70
End: 2020-11-05
Payer: MEDICARE

## 2020-11-05 ENCOUNTER — APPOINTMENT (OUTPATIENT)
Dept: INFUSION THERAPY | Facility: CLINIC | Age: 70
End: 2020-11-05
Payer: MEDICARE

## 2020-11-05 ENCOUNTER — LABORATORY RESULT (OUTPATIENT)
Age: 70
End: 2020-11-05

## 2020-11-05 VITALS
RESPIRATION RATE: 14 BRPM | SYSTOLIC BLOOD PRESSURE: 169 MMHG | BODY MASS INDEX: 34.96 KG/M2 | DIASTOLIC BLOOD PRESSURE: 74 MMHG | WEIGHT: 236 LBS | HEIGHT: 69 IN | TEMPERATURE: 97.3 F | HEART RATE: 77 BPM

## 2020-11-05 LAB
HCT VFR BLD CALC: 29.6 %
HGB BLD-MCNC: 9.5 G/DL
MCHC RBC-ENTMCNC: 30.4 PG
MCHC RBC-ENTMCNC: 32.1 G/DL
MCV RBC AUTO: 94.6 FL
PLATELET # BLD AUTO: 194 K/UL
PMV BLD: 10.1 FL
RBC # BLD: 3.13 M/UL
RBC # FLD: 14.8 %
WBC # FLD AUTO: 7.41 K/UL

## 2020-11-05 PROCEDURE — 99213 OFFICE O/P EST LOW 20 MIN: CPT

## 2020-11-05 RX ORDER — ERYTHROPOIETIN 10000 [IU]/ML
30000 INJECTION, SOLUTION INTRAVENOUS; SUBCUTANEOUS ONCE
Refills: 0 | Status: COMPLETED | OUTPATIENT
Start: 2020-11-05 | End: 2020-11-05

## 2020-11-05 RX ADMIN — ERYTHROPOIETIN 30000 UNIT(S): 10000 INJECTION, SOLUTION INTRAVENOUS; SUBCUTANEOUS at 11:28

## 2020-11-05 NOTE — CONSULT LETTER
[Dear  ___] : Dear  [unfilled], [Courtesy Letter:] : I had the pleasure of seeing your patient, [unfilled], in my office today. [Please see my note below.] : Please see my note below. [Consult Closing:] : Thank you very much for allowing me to participate in the care of this patient.  If you have any questions, please do not hesitate to contact me. [Sincerely,] : Sincerely, [FreeTextEntry2] : Dr. Morton Kleiner [FreeTextEntry3] : Dr. JAKOB Hooper

## 2020-11-05 NOTE — PHYSICAL EXAM
[Restricted in physically strenuous activity but ambulatory and able to carry out work of a light or sedentary nature] : Status 1- Restricted in physically strenuous activity but ambulatory and able to carry out work of a light or sedentary nature, e.g., light house work, office work [Obese] : obese [Normal] : affect appropriate [de-identified] : Venous stasis changes present bilaterally with hyperpigmentation of the skin. [de-identified] : Mild arthritic changes [de-identified] : Healing wound at the shin on the RLE; just a small residual superficial erosion remaining. [de-identified] : Gait is normal.

## 2020-11-05 NOTE — HISTORY OF PRESENT ILLNESS
[Disease:__________________________] : Disease: [unfilled] [de-identified] : The patient is coming for his regularly scheduled follow up for his multifactorial anemia but mostly secondary to inflammation and his kidney disease.\par Recently he had an infection of the right lower leg eventually treated with Keflex with good results. He is followed by his primary care physician. He is also complaining of right knee pain which is being subsided by putting heat on the knee.\par Otherwise the appetite is good. No problems with urine or bowel movements. No new maintenance medications. He is up to date with his screenings.\par No new events in the family.

## 2020-11-05 NOTE — ASSESSMENT
[FreeTextEntry1] : Anemia, normocytic, with essentially normal RDW, secondary to inflammation (chronic disease) and kidney disease, overall stable and responding to Procrit/Retacrit.\par Will continue with weekly Retacrit. Will hold it if Hgb 11 or above.\par \par \par The patient does not need to see us more frequently than every 6 months and as needed, but the H/H will be monitored closely for treatment purposes.\par \par All questions answered.

## 2020-11-05 NOTE — REVIEW OF SYSTEMS
[Fatigue] : fatigue [Lower Ext Edema] : lower extremity edema [SOB on Exertion] : shortness of breath during exertion [Joint Pain] : joint pain [Joint Stiffness] : joint stiffness [Skin Wound] : skin wound [Insomnia] : insomnia [Negative] : Heme/Lymph [de-identified] : Secondary to sleep apnea

## 2020-11-05 NOTE — REASON FOR VISIT
[Follow-Up Visit] : a follow-up visit for [FreeTextEntry2] : Anemia of inflammation and kidney disease

## 2020-11-13 ENCOUNTER — LABORATORY RESULT (OUTPATIENT)
Age: 70
End: 2020-11-13

## 2020-11-13 ENCOUNTER — APPOINTMENT (OUTPATIENT)
Dept: INFUSION THERAPY | Facility: CLINIC | Age: 70
End: 2020-11-13

## 2020-11-13 LAB
HCT VFR BLD CALC: 31 %
HGB BLD-MCNC: 9.9 G/DL
MCHC RBC-ENTMCNC: 30.4 PG
MCHC RBC-ENTMCNC: 31.9 G/DL
MCV RBC AUTO: 95.1 FL
PLATELET # BLD AUTO: 188 K/UL
PMV BLD: 10.1 FL
RBC # BLD: 3.26 M/UL
RBC # FLD: 15.8 %
WBC # FLD AUTO: 5.92 K/UL

## 2020-11-13 RX ORDER — ERYTHROPOIETIN 10000 [IU]/ML
30000 INJECTION, SOLUTION INTRAVENOUS; SUBCUTANEOUS ONCE
Refills: 0 | Status: COMPLETED | OUTPATIENT
Start: 2020-11-13 | End: 2020-11-13

## 2020-11-13 RX ADMIN — ERYTHROPOIETIN 30000 UNIT(S): 10000 INJECTION, SOLUTION INTRAVENOUS; SUBCUTANEOUS at 12:36

## 2020-11-16 ENCOUNTER — NON-APPOINTMENT (OUTPATIENT)
Age: 70
End: 2020-11-16

## 2020-11-17 LAB
25(OH)D3 SERPL-MCNC: 62 NG/ML
ALBUMIN SERPL ELPH-MCNC: 4.2 G/DL
ALP BLD-CCNC: 79 U/L
ALT SERPL-CCNC: 14 U/L
ANION GAP SERPL CALC-SCNC: 17 MMOL/L
AST SERPL-CCNC: 16 U/L
BASOPHILS # BLD AUTO: 0.04 K/UL
BASOPHILS NFR BLD AUTO: 0.5 %
BILIRUB SERPL-MCNC: 0.5 MG/DL
BUN SERPL-MCNC: 94 MG/DL
CALCIUM SERPL-MCNC: 9.3 MG/DL
CHLORIDE SERPL-SCNC: 107 MMOL/L
CHOLEST SERPL-MCNC: 198 MG/DL
CO2 SERPL-SCNC: 20 MMOL/L
CREAT SERPL-MCNC: 6 MG/DL
EOSINOPHIL # BLD AUTO: 0.38 K/UL
EOSINOPHIL NFR BLD AUTO: 5 %
GLUCOSE SERPL-MCNC: 69 MG/DL
HCT VFR BLD CALC: 31.4 %
HDLC SERPL-MCNC: 43 MG/DL
HGB BLD-MCNC: 9.6 G/DL
IMM GRANULOCYTES NFR BLD AUTO: 0.8 %
LDLC SERPL CALC-MCNC: 148 MG/DL
LYMPHOCYTES # BLD AUTO: 1.05 K/UL
LYMPHOCYTES NFR BLD AUTO: 13.8 %
MAN DIFF?: NORMAL
MCHC RBC-ENTMCNC: 29.8 PG
MCHC RBC-ENTMCNC: 30.6 G/DL
MCV RBC AUTO: 97.5 FL
MONOCYTES # BLD AUTO: 1.28 K/UL
MONOCYTES NFR BLD AUTO: 16.8 %
NEUTROPHILS # BLD AUTO: 4.81 K/UL
NEUTROPHILS NFR BLD AUTO: 63.1 %
NONHDLC SERPL-MCNC: 155 MG/DL
PLATELET # BLD AUTO: 195 K/UL
POTASSIUM SERPL-SCNC: 5.2 MMOL/L
PROT SERPL-MCNC: 7.2 G/DL
RBC # BLD: 3.22 M/UL
RBC # FLD: 16.1 %
SODIUM SERPL-SCNC: 144 MMOL/L
TRIGL SERPL-MCNC: 79 MG/DL
TSH SERPL-ACNC: 0.86 UIU/ML
WBC # FLD AUTO: 7.62 K/UL

## 2020-11-18 LAB
24R-OH-CALCIDIOL SERPL-MCNC: 50.1 PG/ML
CREAT SPEC-SCNC: 53 MG/DL
MICROALBUMIN 24H UR DL<=1MG/L-MCNC: 191 MG/DL
MICROALBUMIN/CREAT 24H UR-RTO: 3584 MG/G

## 2020-11-20 ENCOUNTER — OUTPATIENT (OUTPATIENT)
Dept: OUTPATIENT SERVICES | Facility: HOSPITAL | Age: 70
LOS: 1 days | Discharge: HOME | End: 2020-11-20

## 2020-11-20 ENCOUNTER — LABORATORY RESULT (OUTPATIENT)
Age: 70
End: 2020-11-20

## 2020-11-20 ENCOUNTER — APPOINTMENT (OUTPATIENT)
Dept: MEDICATION MANAGEMENT | Facility: CLINIC | Age: 70
End: 2020-11-20

## 2020-11-20 ENCOUNTER — APPOINTMENT (OUTPATIENT)
Dept: INFUSION THERAPY | Facility: CLINIC | Age: 70
End: 2020-11-20

## 2020-11-20 VITALS — OXYGEN SATURATION: 98 % | RESPIRATION RATE: 16 BRPM | HEART RATE: 72 BPM

## 2020-11-20 DIAGNOSIS — Z98.890 OTHER SPECIFIED POSTPROCEDURAL STATES: Chronic | ICD-10-CM

## 2020-11-20 DIAGNOSIS — Z98.41 CATARACT EXTRACTION STATUS, RIGHT EYE: Chronic | ICD-10-CM

## 2020-11-20 DIAGNOSIS — I48.91 UNSPECIFIED ATRIAL FIBRILLATION: ICD-10-CM

## 2020-11-20 DIAGNOSIS — Z79.01 LONG TERM (CURRENT) USE OF ANTICOAGULANTS: ICD-10-CM

## 2020-11-20 LAB
HCT VFR BLD CALC: 28.7 %
HGB BLD-MCNC: 9.4 G/DL
INR PPP: 6 RATIO
MCHC RBC-ENTMCNC: 31.2 PG
MCHC RBC-ENTMCNC: 32.8 G/DL
MCV RBC AUTO: 95.3 FL
PLATELET # BLD AUTO: 205 K/UL
PMV BLD: 9.9 FL
POCT-PROTHROMBIN TIME: 71.4 SECS
QUALITY CONTROL: YES
RBC # BLD: 3.01 M/UL
RBC # FLD: 16.4 %
WBC # FLD AUTO: 8.63 K/UL

## 2020-11-20 RX ORDER — ERYTHROPOIETIN 10000 [IU]/ML
30000 INJECTION, SOLUTION INTRAVENOUS; SUBCUTANEOUS ONCE
Refills: 0 | Status: COMPLETED | OUTPATIENT
Start: 2020-11-20 | End: 2020-11-20

## 2020-11-20 RX ADMIN — ERYTHROPOIETIN 30000 UNIT(S): 10000 INJECTION, SOLUTION INTRAVENOUS; SUBCUTANEOUS at 11:17

## 2020-11-21 ENCOUNTER — APPOINTMENT (OUTPATIENT)
Dept: ENDOCRINOLOGY | Facility: CLINIC | Age: 70
End: 2020-11-21
Payer: MEDICARE

## 2020-11-21 VITALS
BODY MASS INDEX: 34.36 KG/M2 | OXYGEN SATURATION: 98 % | WEIGHT: 232 LBS | DIASTOLIC BLOOD PRESSURE: 68 MMHG | TEMPERATURE: 96.8 F | HEIGHT: 69 IN | SYSTOLIC BLOOD PRESSURE: 128 MMHG | HEART RATE: 72 BPM

## 2020-11-21 PROCEDURE — 99215 OFFICE O/P EST HI 40 MIN: CPT

## 2020-11-21 NOTE — ASSESSMENT
[FreeTextEntry1] : I had a long discussion with patient regarding diabetes control including home readings. Goal HbA1c was discussed and counseling provided regarding diet/exercise and complications of diabetes (renal and cardiac and neurologic as well as  and need for eye exams and examination of feet. Lipids and importance of BP control also reviewed. FBS was 69.\par Pt. with detached retina and now INR of 6.0. Pt. with hematuria. To f/u with [facility].\par

## 2020-11-23 ENCOUNTER — APPOINTMENT (OUTPATIENT)
Dept: MEDICATION MANAGEMENT | Facility: CLINIC | Age: 70
End: 2020-11-23

## 2020-11-23 ENCOUNTER — OUTPATIENT (OUTPATIENT)
Dept: OUTPATIENT SERVICES | Facility: HOSPITAL | Age: 70
LOS: 1 days | Discharge: HOME | End: 2020-11-23

## 2020-11-23 DIAGNOSIS — Z79.01 LONG TERM (CURRENT) USE OF ANTICOAGULANTS: ICD-10-CM

## 2020-11-23 DIAGNOSIS — I48.91 UNSPECIFIED ATRIAL FIBRILLATION: ICD-10-CM

## 2020-11-23 DIAGNOSIS — Z98.890 OTHER SPECIFIED POSTPROCEDURAL STATES: Chronic | ICD-10-CM

## 2020-11-23 DIAGNOSIS — Z98.41 CATARACT EXTRACTION STATUS, RIGHT EYE: Chronic | ICD-10-CM

## 2020-11-23 LAB
INR PPP: 2.2 RATIO
POCT-PROTHROMBIN TIME: 26.4 SECS
QUALITY CONTROL: YES
SARS-COV-2 IGG SERPL IA-ACNC: 0.1
SARS-COV-2 IGG SERPL QL IA: NEGATIVE

## 2020-11-27 ENCOUNTER — APPOINTMENT (OUTPATIENT)
Dept: INFUSION THERAPY | Facility: CLINIC | Age: 70
End: 2020-11-27

## 2020-11-27 ENCOUNTER — LABORATORY RESULT (OUTPATIENT)
Age: 70
End: 2020-11-27

## 2020-11-27 LAB
HCT VFR BLD CALC: 23.6 %
HGB BLD-MCNC: 7.4 G/DL
MCHC RBC-ENTMCNC: 31.4 G/DL
MCHC RBC-ENTMCNC: 31.4 PG
MCV RBC AUTO: 100 FL
PLATELET # BLD AUTO: 170 K/UL
PMV BLD: 10.2 FL
RBC # BLD: 2.36 M/UL
RBC # FLD: 18 %
WBC # FLD AUTO: 8.48 K/UL

## 2020-11-27 RX ORDER — ERYTHROPOIETIN 10000 [IU]/ML
30000 INJECTION, SOLUTION INTRAVENOUS; SUBCUTANEOUS ONCE
Refills: 0 | Status: COMPLETED | OUTPATIENT
Start: 2020-11-27 | End: 2020-11-27

## 2020-11-27 RX ADMIN — ERYTHROPOIETIN 30000 UNIT(S): 10000 INJECTION, SOLUTION INTRAVENOUS; SUBCUTANEOUS at 11:00

## 2020-12-01 ENCOUNTER — LABORATORY RESULT (OUTPATIENT)
Age: 70
End: 2020-12-01

## 2020-12-01 ENCOUNTER — APPOINTMENT (OUTPATIENT)
Dept: HEMATOLOGY ONCOLOGY | Facility: CLINIC | Age: 70
End: 2020-12-01

## 2020-12-01 ENCOUNTER — APPOINTMENT (OUTPATIENT)
Dept: MEDICATION MANAGEMENT | Facility: CLINIC | Age: 70
End: 2020-12-01

## 2020-12-01 ENCOUNTER — OUTPATIENT (OUTPATIENT)
Dept: OUTPATIENT SERVICES | Facility: HOSPITAL | Age: 70
LOS: 1 days | Discharge: HOME | End: 2020-12-01

## 2020-12-01 VITALS — OXYGEN SATURATION: 99 % | RESPIRATION RATE: 16 BRPM | HEART RATE: 72 BPM

## 2020-12-01 DIAGNOSIS — Z98.890 OTHER SPECIFIED POSTPROCEDURAL STATES: Chronic | ICD-10-CM

## 2020-12-01 DIAGNOSIS — Z98.41 CATARACT EXTRACTION STATUS, RIGHT EYE: Chronic | ICD-10-CM

## 2020-12-01 LAB
ABO + RH PNL BLD: NORMAL
BLD GP AB SCN SERPL QL: NORMAL
HCT VFR BLD CALC: 23.7 %
HGB BLD-MCNC: 7.3 G/DL
INR PPP: 1.5 RATIO
MCHC RBC-ENTMCNC: 30.8 G/DL
MCHC RBC-ENTMCNC: 31.7 PG
MCV RBC AUTO: 103 FL
PLATELET # BLD AUTO: 164 K/UL
PMV BLD: 10.2 FL
POCT-PROTHROMBIN TIME: 17.6 SECS
QUALITY CONTROL: YES
RBC # BLD: 2.3 M/UL
RBC # FLD: 18 %
WBC # FLD AUTO: 6.47 K/UL

## 2020-12-02 ENCOUNTER — APPOINTMENT (OUTPATIENT)
Dept: INFUSION THERAPY | Facility: CLINIC | Age: 70
End: 2020-12-02

## 2020-12-02 RX ORDER — ERYTHROPOIETIN 10000 [IU]/ML
30000 INJECTION, SOLUTION INTRAVENOUS; SUBCUTANEOUS ONCE
Refills: 0 | Status: COMPLETED | OUTPATIENT
Start: 2020-12-02 | End: 2020-12-02

## 2020-12-02 RX ADMIN — ERYTHROPOIETIN 30000 UNIT(S): 10000 INJECTION, SOLUTION INTRAVENOUS; SUBCUTANEOUS at 16:51

## 2020-12-04 ENCOUNTER — APPOINTMENT (OUTPATIENT)
Dept: INFUSION THERAPY | Facility: CLINIC | Age: 70
End: 2020-12-04

## 2020-12-11 ENCOUNTER — APPOINTMENT (OUTPATIENT)
Dept: INFUSION THERAPY | Facility: CLINIC | Age: 70
End: 2020-12-11

## 2020-12-11 ENCOUNTER — LABORATORY RESULT (OUTPATIENT)
Age: 70
End: 2020-12-11

## 2020-12-11 ENCOUNTER — OUTPATIENT (OUTPATIENT)
Dept: OUTPATIENT SERVICES | Facility: HOSPITAL | Age: 70
LOS: 1 days | Discharge: HOME | End: 2020-12-11

## 2020-12-11 ENCOUNTER — APPOINTMENT (OUTPATIENT)
Dept: MEDICATION MANAGEMENT | Facility: CLINIC | Age: 70
End: 2020-12-11

## 2020-12-11 VITALS — RESPIRATION RATE: 16 BRPM

## 2020-12-11 DIAGNOSIS — Z79.01 LONG TERM (CURRENT) USE OF ANTICOAGULANTS: ICD-10-CM

## 2020-12-11 DIAGNOSIS — Z98.890 OTHER SPECIFIED POSTPROCEDURAL STATES: Chronic | ICD-10-CM

## 2020-12-11 DIAGNOSIS — Z98.41 CATARACT EXTRACTION STATUS, RIGHT EYE: Chronic | ICD-10-CM

## 2020-12-11 DIAGNOSIS — I48.91 UNSPECIFIED ATRIAL FIBRILLATION: ICD-10-CM

## 2020-12-11 LAB
HCT VFR BLD CALC: 29.1 %
HGB BLD-MCNC: 9.1 G/DL
INR PPP: 2.4 RATIO
MCHC RBC-ENTMCNC: 31.3 G/DL
MCHC RBC-ENTMCNC: 31.5 PG
MCV RBC AUTO: 100.7 FL
PLATELET # BLD AUTO: 153 K/UL
PMV BLD: 9.9 FL
POCT-PROTHROMBIN TIME: 28.4 SECS
QUALITY CONTROL: YES
RBC # BLD: 2.89 M/UL
RBC # FLD: 17.7 %
WBC # FLD AUTO: 6.8 K/UL

## 2020-12-11 RX ORDER — ERYTHROPOIETIN 10000 [IU]/ML
30000 INJECTION, SOLUTION INTRAVENOUS; SUBCUTANEOUS ONCE
Refills: 0 | Status: COMPLETED | OUTPATIENT
Start: 2020-12-11 | End: 2020-12-11

## 2020-12-11 RX ADMIN — ERYTHROPOIETIN 30000 UNIT(S): 10000 INJECTION, SOLUTION INTRAVENOUS; SUBCUTANEOUS at 10:49

## 2020-12-18 ENCOUNTER — LABORATORY RESULT (OUTPATIENT)
Age: 70
End: 2020-12-18

## 2020-12-18 ENCOUNTER — APPOINTMENT (OUTPATIENT)
Dept: INFUSION THERAPY | Facility: CLINIC | Age: 70
End: 2020-12-18

## 2020-12-18 ENCOUNTER — APPOINTMENT (OUTPATIENT)
Dept: UROLOGY | Facility: CLINIC | Age: 70
End: 2020-12-18
Payer: MEDICARE

## 2020-12-18 VITALS — BODY MASS INDEX: 34.51 KG/M2 | HEIGHT: 69 IN | WEIGHT: 233 LBS | TEMPERATURE: 97.7 F

## 2020-12-18 DIAGNOSIS — R39.9 UNSPECIFIED SYMPTOMS AND SIGNS INVOLVING THE GENITOURINARY SYSTEM: ICD-10-CM

## 2020-12-18 LAB
HCT VFR BLD CALC: 26.8 %
HGB BLD-MCNC: 8.4 G/DL
MCHC RBC-ENTMCNC: 31.3 G/DL
MCHC RBC-ENTMCNC: 31.7 PG
MCV RBC AUTO: 101.1 FL
PLATELET # BLD AUTO: 170 K/UL
PMV BLD: 10.1 FL
RBC # BLD: 2.65 M/UL
RBC # FLD: 17.5 %
WBC # FLD AUTO: 7.02 K/UL

## 2020-12-18 PROCEDURE — 99204 OFFICE O/P NEW MOD 45 MIN: CPT

## 2020-12-18 RX ORDER — ERYTHROPOIETIN 10000 [IU]/ML
30000 INJECTION, SOLUTION INTRAVENOUS; SUBCUTANEOUS ONCE
Refills: 0 | Status: COMPLETED | OUTPATIENT
Start: 2020-12-18 | End: 2020-12-18

## 2020-12-18 RX ADMIN — ERYTHROPOIETIN 30000 UNIT(S): 10000 INJECTION, SOLUTION INTRAVENOUS; SUBCUTANEOUS at 14:53

## 2020-12-18 NOTE — HISTORY OF PRESENT ILLNESS
[FreeTextEntry1] : 70-year-old with 4 episodes of gross hematuria with clots since January of 2020. Recently had trouble urinating temporarily then passed large clot now urinates well. Today his urine is clear. He is on Coumadin for A. fib. He quit smoking over 30 years ago. He has high creatinine at 6 and C6 nephrology. He has sensation of incomplete voiding, urinary frequency, intermittency, urgency, weak stream, straining and nocturia.

## 2020-12-18 NOTE — REVIEW OF SYSTEMS
[Fever] : no fever [Nasal Discharge] : no nasal discharge [Sore Throat] : no sore throat [Chest Pain] : no chest pain [Cough] : no cough [Abdominal Pain] : no abdominal pain [Dysuria] : no dysuria [Joint Swelling] : no joint swelling [Skin Wound] : no skin wound [Confused] : no confusion [Change In Personality] : no personality change

## 2020-12-18 NOTE — ASSESSMENT
[FreeTextEntry1] : Gross hematuria. Lower urinary tract symptoms. Discussed with patient portals to make sure there is no tumors or stones. Will get CT scan without contrast this patient has renal insufficiency. Return to office for flexible cystoscopy [Urinary Symptom or Sign (788.99\R39.89)] : implantation

## 2020-12-18 NOTE — PHYSICAL EXAM
[General Appearance - In No Acute Distress] : no acute distress [Edema] : no peripheral edema [] : no respiratory distress [Bowel Sounds] : normal bowel sounds [Normal Station and Gait] : the gait and station were normal for the patient's age [Oriented To Time, Place, And Person] : oriented to person, place, and time

## 2020-12-24 ENCOUNTER — APPOINTMENT (OUTPATIENT)
Dept: INFUSION THERAPY | Facility: CLINIC | Age: 70
End: 2020-12-24

## 2020-12-24 ENCOUNTER — LABORATORY RESULT (OUTPATIENT)
Age: 70
End: 2020-12-24

## 2020-12-24 LAB
HCT VFR BLD CALC: 26.6 %
HGB BLD-MCNC: 8.3 G/DL
MCHC RBC-ENTMCNC: 31.2 G/DL
MCHC RBC-ENTMCNC: 31.8 PG
MCV RBC AUTO: 101.9 FL
PLATELET # BLD AUTO: 173 K/UL
PMV BLD: 10.4 FL
RBC # BLD: 2.61 M/UL
RBC # FLD: 16.7 %
WBC # FLD AUTO: 7.33 K/UL

## 2020-12-24 RX ORDER — ERYTHROPOIETIN 10000 [IU]/ML
30000 INJECTION, SOLUTION INTRAVENOUS; SUBCUTANEOUS ONCE
Refills: 0 | Status: COMPLETED | OUTPATIENT
Start: 2020-12-24 | End: 2020-12-24

## 2020-12-24 RX ADMIN — ERYTHROPOIETIN 30000 UNIT(S): 10000 INJECTION, SOLUTION INTRAVENOUS; SUBCUTANEOUS at 11:06

## 2020-12-29 ENCOUNTER — LABORATORY RESULT (OUTPATIENT)
Age: 70
End: 2020-12-29

## 2020-12-29 ENCOUNTER — OUTPATIENT (OUTPATIENT)
Dept: OUTPATIENT SERVICES | Facility: HOSPITAL | Age: 70
LOS: 1 days | Discharge: HOME | End: 2020-12-29

## 2020-12-29 DIAGNOSIS — Z98.41 CATARACT EXTRACTION STATUS, RIGHT EYE: Chronic | ICD-10-CM

## 2020-12-29 DIAGNOSIS — Z11.59 ENCOUNTER FOR SCREENING FOR OTHER VIRAL DISEASES: ICD-10-CM

## 2020-12-29 DIAGNOSIS — Z98.890 OTHER SPECIFIED POSTPROCEDURAL STATES: Chronic | ICD-10-CM

## 2020-12-31 ENCOUNTER — OUTPATIENT (OUTPATIENT)
Dept: OUTPATIENT SERVICES | Facility: HOSPITAL | Age: 70
LOS: 1 days | Discharge: HOME | End: 2020-12-31

## 2020-12-31 ENCOUNTER — APPOINTMENT (OUTPATIENT)
Dept: INFUSION THERAPY | Facility: CLINIC | Age: 70
End: 2020-12-31

## 2020-12-31 ENCOUNTER — APPOINTMENT (OUTPATIENT)
Dept: MEDICATION MANAGEMENT | Facility: CLINIC | Age: 70
End: 2020-12-31

## 2020-12-31 ENCOUNTER — LABORATORY RESULT (OUTPATIENT)
Age: 70
End: 2020-12-31

## 2020-12-31 VITALS — OXYGEN SATURATION: 98 % | HEART RATE: 68 BPM | RESPIRATION RATE: 16 BRPM

## 2020-12-31 DIAGNOSIS — D64.9 ANEMIA, UNSPECIFIED: ICD-10-CM

## 2020-12-31 DIAGNOSIS — Z98.41 CATARACT EXTRACTION STATUS, RIGHT EYE: Chronic | ICD-10-CM

## 2020-12-31 DIAGNOSIS — Z95.2 PRESENCE OF PROSTHETIC HEART VALVE: ICD-10-CM

## 2020-12-31 DIAGNOSIS — Z98.890 OTHER SPECIFIED POSTPROCEDURAL STATES: Chronic | ICD-10-CM

## 2020-12-31 DIAGNOSIS — I48.91 UNSPECIFIED ATRIAL FIBRILLATION: ICD-10-CM

## 2020-12-31 LAB
HCT VFR BLD CALC: 28 %
HGB BLD-MCNC: 8.6 G/DL
INR PPP: 3 RATIO
MCHC RBC-ENTMCNC: 30.7 G/DL
MCHC RBC-ENTMCNC: 31.2 PG
MCV RBC AUTO: 101.4 FL
PLATELET # BLD AUTO: 168 K/UL
PMV BLD: 10.1 FL
POCT-PROTHROMBIN TIME: 36 SECS
QUALITY CONTROL: YES
RBC # BLD: 2.76 M/UL
RBC # FLD: 16 %
WBC # FLD AUTO: 5.74 K/UL

## 2020-12-31 RX ORDER — ERYTHROPOIETIN 10000 [IU]/ML
30000 INJECTION, SOLUTION INTRAVENOUS; SUBCUTANEOUS ONCE
Refills: 0 | Status: COMPLETED | OUTPATIENT
Start: 2020-12-31 | End: 2020-12-31

## 2020-12-31 RX ADMIN — ERYTHROPOIETIN 30000 UNIT(S): 10000 INJECTION, SOLUTION INTRAVENOUS; SUBCUTANEOUS at 11:19

## 2021-01-01 ENCOUNTER — OUTPATIENT (OUTPATIENT)
Dept: OUTPATIENT SERVICES | Facility: HOSPITAL | Age: 71
LOS: 1 days | Discharge: HOME | End: 2021-01-01

## 2021-01-01 ENCOUNTER — APPOINTMENT (OUTPATIENT)
Dept: ENDOCRINOLOGY | Facility: CLINIC | Age: 71
End: 2021-01-01

## 2021-01-01 ENCOUNTER — LABORATORY RESULT (OUTPATIENT)
Age: 71
End: 2021-01-01

## 2021-01-01 ENCOUNTER — APPOINTMENT (OUTPATIENT)
Dept: MEDICATION MANAGEMENT | Facility: CLINIC | Age: 71
End: 2021-01-01

## 2021-01-01 ENCOUNTER — APPOINTMENT (OUTPATIENT)
Dept: INFUSION THERAPY | Facility: CLINIC | Age: 71
End: 2021-01-01

## 2021-01-01 ENCOUNTER — NON-APPOINTMENT (OUTPATIENT)
Age: 71
End: 2021-01-01

## 2021-01-01 ENCOUNTER — OUTPATIENT (OUTPATIENT)
Dept: OUTPATIENT SERVICES | Facility: HOSPITAL | Age: 71
LOS: 1 days | Discharge: HOME | End: 2021-01-01
Payer: MEDICARE

## 2021-01-01 ENCOUNTER — APPOINTMENT (OUTPATIENT)
Dept: HEMATOLOGY ONCOLOGY | Facility: CLINIC | Age: 71
End: 2021-01-01
Payer: MEDICARE

## 2021-01-01 ENCOUNTER — APPOINTMENT (OUTPATIENT)
Dept: HEMATOLOGY ONCOLOGY | Facility: CLINIC | Age: 71
End: 2021-01-01

## 2021-01-01 ENCOUNTER — APPOINTMENT (OUTPATIENT)
Dept: UROLOGY | Facility: CLINIC | Age: 71
End: 2021-01-01
Payer: MEDICARE

## 2021-01-01 ENCOUNTER — APPOINTMENT (OUTPATIENT)
Dept: UROLOGY | Facility: HOSPITAL | Age: 71
End: 2021-01-01

## 2021-01-01 ENCOUNTER — EMERGENCY (EMERGENCY)
Facility: HOSPITAL | Age: 71
LOS: 0 days | Discharge: LEFT BEFORE DISCHARGE | End: 2021-10-05
Attending: EMERGENCY MEDICINE | Admitting: EMERGENCY MEDICINE
Payer: MEDICARE

## 2021-01-01 ENCOUNTER — TRANSCRIPTION ENCOUNTER (OUTPATIENT)
Age: 71
End: 2021-01-01

## 2021-01-01 ENCOUNTER — INPATIENT (INPATIENT)
Facility: HOSPITAL | Age: 71
LOS: 1 days | Discharge: HOME | End: 2021-12-30
Attending: INTERNAL MEDICINE | Admitting: INTERNAL MEDICINE
Payer: MEDICARE

## 2021-01-01 ENCOUNTER — APPOINTMENT (OUTPATIENT)
Dept: PULMONOLOGY | Facility: CLINIC | Age: 71
End: 2021-01-01

## 2021-01-01 ENCOUNTER — APPOINTMENT (OUTPATIENT)
Dept: INFUSION THERAPY | Facility: CLINIC | Age: 71
End: 2021-01-01
Payer: MEDICARE

## 2021-01-01 ENCOUNTER — APPOINTMENT (OUTPATIENT)
Dept: ENDOCRINOLOGY | Facility: CLINIC | Age: 71
End: 2021-01-01
Payer: MEDICARE

## 2021-01-01 ENCOUNTER — RESULT REVIEW (OUTPATIENT)
Age: 71
End: 2021-01-01

## 2021-01-01 ENCOUNTER — APPOINTMENT (OUTPATIENT)
Dept: RADIATION ONCOLOGY | Facility: HOSPITAL | Age: 71
End: 2021-01-01
Payer: MEDICARE

## 2021-01-01 VITALS
BODY MASS INDEX: 28.58 KG/M2 | WEIGHT: 193 LBS | RESPIRATION RATE: 14 BRPM | TEMPERATURE: 98.6 F | DIASTOLIC BLOOD PRESSURE: 59 MMHG | HEIGHT: 69 IN | HEART RATE: 89 BPM | SYSTOLIC BLOOD PRESSURE: 123 MMHG

## 2021-01-01 VITALS
DIASTOLIC BLOOD PRESSURE: 66 MMHG | RESPIRATION RATE: 14 BRPM | TEMPERATURE: 97.6 F | BODY MASS INDEX: 36.43 KG/M2 | HEIGHT: 69 IN | HEART RATE: 76 BPM | WEIGHT: 246 LBS | SYSTOLIC BLOOD PRESSURE: 142 MMHG

## 2021-01-01 VITALS
TEMPERATURE: 97 F | WEIGHT: 250 LBS | RESPIRATION RATE: 17 BRPM | HEIGHT: 69 IN | DIASTOLIC BLOOD PRESSURE: 70 MMHG | OXYGEN SATURATION: 100 % | SYSTOLIC BLOOD PRESSURE: 133 MMHG | HEART RATE: 88 BPM

## 2021-01-01 VITALS
SYSTOLIC BLOOD PRESSURE: 122 MMHG | WEIGHT: 258 LBS | BODY MASS INDEX: 38.21 KG/M2 | HEIGHT: 69 IN | OXYGEN SATURATION: 97 % | HEART RATE: 83 BPM | DIASTOLIC BLOOD PRESSURE: 62 MMHG | TEMPERATURE: 97.4 F

## 2021-01-01 VITALS
SYSTOLIC BLOOD PRESSURE: 125 MMHG | RESPIRATION RATE: 18 BRPM | TEMPERATURE: 97 F | DIASTOLIC BLOOD PRESSURE: 62 MMHG | OXYGEN SATURATION: 97 %

## 2021-01-01 VITALS
RESPIRATION RATE: 19 BRPM | HEART RATE: 97 BPM | SYSTOLIC BLOOD PRESSURE: 122 MMHG | DIASTOLIC BLOOD PRESSURE: 66 MMHG | OXYGEN SATURATION: 94 %

## 2021-01-01 VITALS
TEMPERATURE: 97 F | RESPIRATION RATE: 18 BRPM | HEART RATE: 66 BPM | HEIGHT: 69 IN | DIASTOLIC BLOOD PRESSURE: 57 MMHG | OXYGEN SATURATION: 98 % | SYSTOLIC BLOOD PRESSURE: 115 MMHG

## 2021-01-01 VITALS — RESPIRATION RATE: 18 BRPM | OXYGEN SATURATION: 96 % | HEART RATE: 105 BPM

## 2021-01-01 VITALS — BODY MASS INDEX: 32.58 KG/M2 | WEIGHT: 220 LBS | HEIGHT: 69 IN

## 2021-01-01 VITALS
SYSTOLIC BLOOD PRESSURE: 155 MMHG | RESPIRATION RATE: 17 BRPM | DIASTOLIC BLOOD PRESSURE: 72 MMHG | WEIGHT: 249.12 LBS | HEART RATE: 80 BPM | OXYGEN SATURATION: 98 % | HEIGHT: 69 IN | TEMPERATURE: 97 F

## 2021-01-01 VITALS
BODY MASS INDEX: 32.44 KG/M2 | HEART RATE: 88 BPM | DIASTOLIC BLOOD PRESSURE: 60 MMHG | WEIGHT: 219 LBS | HEIGHT: 69 IN | TEMPERATURE: 97.6 F | SYSTOLIC BLOOD PRESSURE: 126 MMHG | RESPIRATION RATE: 14 BRPM

## 2021-01-01 VITALS
BODY MASS INDEX: 36.43 KG/M2 | SYSTOLIC BLOOD PRESSURE: 134 MMHG | DIASTOLIC BLOOD PRESSURE: 61 MMHG | HEART RATE: 77 BPM | HEIGHT: 69 IN | WEIGHT: 246 LBS | TEMPERATURE: 97.6 F | RESPIRATION RATE: 14 BRPM

## 2021-01-01 VITALS — WEIGHT: 246 LBS | BODY MASS INDEX: 36.43 KG/M2 | HEIGHT: 69 IN

## 2021-01-01 VITALS
HEART RATE: 83 BPM | RESPIRATION RATE: 20 BRPM | OXYGEN SATURATION: 98 % | DIASTOLIC BLOOD PRESSURE: 60 MMHG | SYSTOLIC BLOOD PRESSURE: 147 MMHG | WEIGHT: 195.11 LBS | HEIGHT: 69 IN | TEMPERATURE: 97 F

## 2021-01-01 VITALS
RESPIRATION RATE: 16 BRPM | DIASTOLIC BLOOD PRESSURE: 61 MMHG | TEMPERATURE: 97.2 F | HEART RATE: 85 BPM | OXYGEN SATURATION: 93 % | WEIGHT: 220 LBS | BODY MASS INDEX: 32.49 KG/M2 | SYSTOLIC BLOOD PRESSURE: 128 MMHG

## 2021-01-01 VITALS
DIASTOLIC BLOOD PRESSURE: 88 MMHG | SYSTOLIC BLOOD PRESSURE: 133 MMHG | TEMPERATURE: 97 F | HEART RATE: 90 BPM | RESPIRATION RATE: 16 BRPM

## 2021-01-01 VITALS
SYSTOLIC BLOOD PRESSURE: 144 MMHG | RESPIRATION RATE: 16 BRPM | HEART RATE: 77 BPM | TEMPERATURE: 97.3 F | DIASTOLIC BLOOD PRESSURE: 64 MMHG

## 2021-01-01 DIAGNOSIS — Z98.890 OTHER SPECIFIED POSTPROCEDURAL STATES: Chronic | ICD-10-CM

## 2021-01-01 DIAGNOSIS — C67.9 MALIGNANT NEOPLASM OF BLADDER, UNSPECIFIED: ICD-10-CM

## 2021-01-01 DIAGNOSIS — Z79.01 LONG TERM (CURRENT) USE OF ANTICOAGULANTS: ICD-10-CM

## 2021-01-01 DIAGNOSIS — N40.0 BENIGN PROSTATIC HYPERPLASIA WITHOUT LOWER URINARY TRACT SYMPTOMS: ICD-10-CM

## 2021-01-01 DIAGNOSIS — Z11.59 ENCOUNTER FOR SCREENING FOR OTHER VIRAL DISEASES: ICD-10-CM

## 2021-01-01 DIAGNOSIS — E78.1 PURE HYPERGLYCERIDEMIA: ICD-10-CM

## 2021-01-01 DIAGNOSIS — I48.91 UNSPECIFIED ATRIAL FIBRILLATION: ICD-10-CM

## 2021-01-01 DIAGNOSIS — Z02.9 ENCOUNTER FOR ADMINISTRATIVE EXAMINATIONS, UNSPECIFIED: ICD-10-CM

## 2021-01-01 DIAGNOSIS — Z98.41 CATARACT EXTRACTION STATUS, RIGHT EYE: Chronic | ICD-10-CM

## 2021-01-01 DIAGNOSIS — Z01.818 ENCOUNTER FOR OTHER PREPROCEDURAL EXAMINATION: ICD-10-CM

## 2021-01-01 DIAGNOSIS — C67.8 MALIGNANT NEOPLASM OF OVERLAPPING SITES OF BLADDER: ICD-10-CM

## 2021-01-01 DIAGNOSIS — M89.9 DISORDER OF BONE, UNSPECIFIED: ICD-10-CM

## 2021-01-01 DIAGNOSIS — Z87.09 PERSONAL HISTORY OF OTHER DISEASES OF THE RESPIRATORY SYSTEM: ICD-10-CM

## 2021-01-01 DIAGNOSIS — R31.9 HEMATURIA, UNSPECIFIED: ICD-10-CM

## 2021-01-01 DIAGNOSIS — E66.9 OBESITY, UNSPECIFIED: ICD-10-CM

## 2021-01-01 DIAGNOSIS — Z88.8 ALLERGY STATUS TO OTHER DRUGS, MEDICAMENTS AND BIOLOGICAL SUBSTANCES STATUS: ICD-10-CM

## 2021-01-01 DIAGNOSIS — N28.9 DISORDER OF KIDNEY AND URETER, UNSPECIFIED: ICD-10-CM

## 2021-01-01 DIAGNOSIS — Z80.52 FAMILY HISTORY OF MALIGNANT NEOPLASM OF BLADDER: ICD-10-CM

## 2021-01-01 DIAGNOSIS — Z79.84 LONG TERM (CURRENT) USE OF ORAL HYPOGLYCEMIC DRUGS: ICD-10-CM

## 2021-01-01 DIAGNOSIS — D64.9 ANEMIA, UNSPECIFIED: ICD-10-CM

## 2021-01-01 DIAGNOSIS — E78.00 PURE HYPERCHOLESTEROLEMIA, UNSPECIFIED: ICD-10-CM

## 2021-01-01 DIAGNOSIS — I12.9 HYPERTENSIVE CHRONIC KIDNEY DISEASE WITH STAGE 1 THROUGH STAGE 4 CHRONIC KIDNEY DISEASE, OR UNSPECIFIED CHRONIC KIDNEY DISEASE: ICD-10-CM

## 2021-01-01 DIAGNOSIS — N18.9 CHRONIC KIDNEY DISEASE, UNSPECIFIED: ICD-10-CM

## 2021-01-01 DIAGNOSIS — E11.22 TYPE 2 DIABETES MELLITUS WITH DIABETIC CHRONIC KIDNEY DISEASE: ICD-10-CM

## 2021-01-01 DIAGNOSIS — Z79.82 LONG TERM (CURRENT) USE OF ASPIRIN: ICD-10-CM

## 2021-01-01 DIAGNOSIS — Z86.51 PERSONAL HISTORY OF COMBAT AND OPERATIONAL STRESS REACTION: ICD-10-CM

## 2021-01-01 DIAGNOSIS — L51.1 STEVENS-JOHNSON SYNDROME: ICD-10-CM

## 2021-01-01 DIAGNOSIS — D75.89 OTHER SPECIFIED DISEASES OF BLOOD AND BLOOD-FORMING ORGANS: ICD-10-CM

## 2021-01-01 DIAGNOSIS — N18.6 END STAGE RENAL DISEASE: ICD-10-CM

## 2021-01-01 DIAGNOSIS — Z99.2 DEPENDENCE ON RENAL DIALYSIS: ICD-10-CM

## 2021-01-01 DIAGNOSIS — M10.9 GOUT, UNSPECIFIED: ICD-10-CM

## 2021-01-01 DIAGNOSIS — Z87.891 PERSONAL HISTORY OF NICOTINE DEPENDENCE: ICD-10-CM

## 2021-01-01 DIAGNOSIS — E78.5 HYPERLIPIDEMIA, UNSPECIFIED: ICD-10-CM

## 2021-01-01 DIAGNOSIS — Z79.4 LONG TERM (CURRENT) USE OF INSULIN: ICD-10-CM

## 2021-01-01 DIAGNOSIS — G47.33 OBSTRUCTIVE SLEEP APNEA (ADULT) (PEDIATRIC): ICD-10-CM

## 2021-01-01 DIAGNOSIS — Z51.81 ENCOUNTER FOR THERAPEUTIC DRUG LVL MONITORING: ICD-10-CM

## 2021-01-01 DIAGNOSIS — I12.0 HYPERTENSIVE CHRONIC KIDNEY DISEASE WITH STAGE 5 CHRONIC KIDNEY DISEASE OR END STAGE RENAL DISEASE: ICD-10-CM

## 2021-01-01 DIAGNOSIS — Z79.01 ENCOUNTER FOR THERAPEUTIC DRUG LVL MONITORING: ICD-10-CM

## 2021-01-01 DIAGNOSIS — Z88.8 ALLERGY STATUS TO OTHER DRUGS, MEDICAMENTS AND BIOLOGICAL SUBSTANCES: ICD-10-CM

## 2021-01-01 DIAGNOSIS — R06.00 DYSPNEA, UNSPECIFIED: ICD-10-CM

## 2021-01-01 LAB
A1C WITH ESTIMATED AVERAGE GLUCOSE RESULT: 5.8 % — HIGH (ref 4–5.6)
A1C WITH ESTIMATED AVERAGE GLUCOSE RESULT: 5.9 % — HIGH (ref 4–5.6)
ABO + RH PNL BLD: NORMAL
ALBUMIN SERPL ELPH-MCNC: 2.8 G/DL — LOW (ref 3.5–5.2)
ALBUMIN SERPL ELPH-MCNC: 2.8 G/DL — LOW (ref 3.5–5.2)
ALBUMIN SERPL ELPH-MCNC: 2.9 G/DL — LOW (ref 3.5–5.2)
ALBUMIN SERPL ELPH-MCNC: 3.7 G/DL
ALBUMIN SERPL ELPH-MCNC: 3.8 G/DL
ALBUMIN SERPL ELPH-MCNC: 4 G/DL — SIGNIFICANT CHANGE UP (ref 3.5–5.2)
ALBUMIN SERPL ELPH-MCNC: 4.1 G/DL — SIGNIFICANT CHANGE UP (ref 3.5–5.2)
ALP BLD-CCNC: 95 U/L
ALP BLD-CCNC: 96 U/L
ALP SERPL-CCNC: 107 U/L — SIGNIFICANT CHANGE UP (ref 30–115)
ALP SERPL-CCNC: 192 U/L — HIGH (ref 30–115)
ALP SERPL-CCNC: 213 U/L — HIGH (ref 30–115)
ALP SERPL-CCNC: 343 U/L — HIGH (ref 30–115)
ALP SERPL-CCNC: 99 U/L — SIGNIFICANT CHANGE UP (ref 30–115)
ALT FLD-CCNC: 14 U/L — SIGNIFICANT CHANGE UP (ref 0–41)
ALT FLD-CCNC: 16 U/L — SIGNIFICANT CHANGE UP (ref 0–41)
ALT FLD-CCNC: 16 U/L — SIGNIFICANT CHANGE UP (ref 0–41)
ALT FLD-CCNC: 21 U/L — SIGNIFICANT CHANGE UP (ref 0–41)
ALT FLD-CCNC: 30 U/L — SIGNIFICANT CHANGE UP (ref 0–41)
ALT SERPL-CCNC: 11 U/L
ALT SERPL-CCNC: 19 U/L
ANION GAP SERPL CALC-SCNC: 16 MMOL/L
ANION GAP SERPL CALC-SCNC: 16 MMOL/L — HIGH (ref 7–14)
ANION GAP SERPL CALC-SCNC: 17 MMOL/L — HIGH (ref 7–14)
ANION GAP SERPL CALC-SCNC: 17 MMOL/L — HIGH (ref 7–14)
ANION GAP SERPL CALC-SCNC: 18 MMOL/L — HIGH (ref 7–14)
ANION GAP SERPL CALC-SCNC: 19 MMOL/L — HIGH (ref 7–14)
ANION GAP SERPL CALC-SCNC: 20 MMOL/L
ANISOCYTOSIS BLD QL: SLIGHT — SIGNIFICANT CHANGE UP
ANISOCYTOSIS BLD QL: SLIGHT — SIGNIFICANT CHANGE UP
APPEARANCE UR: ABNORMAL
APPEARANCE: CLEAR
APTT BLD: 24.4 SEC — LOW (ref 27–39.2)
APTT BLD: 33.1 SEC — SIGNIFICANT CHANGE UP (ref 27–39.2)
APTT BLD: 42.6 SEC — HIGH (ref 27–39.2)
AST SERPL-CCNC: 14 U/L
AST SERPL-CCNC: 18 U/L — SIGNIFICANT CHANGE UP (ref 0–41)
AST SERPL-CCNC: 18 U/L — SIGNIFICANT CHANGE UP (ref 0–41)
AST SERPL-CCNC: 24 U/L — SIGNIFICANT CHANGE UP (ref 0–41)
AST SERPL-CCNC: 26 U/L — SIGNIFICANT CHANGE UP (ref 0–41)
AST SERPL-CCNC: 35 U/L
AST SERPL-CCNC: 41 U/L — SIGNIFICANT CHANGE UP (ref 0–41)
BACTERIA # UR AUTO: NEGATIVE — SIGNIFICANT CHANGE UP
BACTERIA UR CULT: NORMAL
BASE EXCESS BLDV CALC-SCNC: -3 MMOL/L — LOW (ref -2–3)
BASOPHILS # BLD AUTO: 0 K/UL — SIGNIFICANT CHANGE UP (ref 0–0.2)
BASOPHILS # BLD AUTO: 0 K/UL — SIGNIFICANT CHANGE UP (ref 0–0.2)
BASOPHILS # BLD AUTO: 0.05 K/UL — SIGNIFICANT CHANGE UP (ref 0–0.2)
BASOPHILS # BLD AUTO: 0.06 K/UL — SIGNIFICANT CHANGE UP (ref 0–0.2)
BASOPHILS # BLD AUTO: 0.06 K/UL — SIGNIFICANT CHANGE UP (ref 0–0.2)
BASOPHILS NFR BLD AUTO: 0 % — SIGNIFICANT CHANGE UP (ref 0–1)
BASOPHILS NFR BLD AUTO: 0 % — SIGNIFICANT CHANGE UP (ref 0–1)
BASOPHILS NFR BLD AUTO: 0.3 % — SIGNIFICANT CHANGE UP (ref 0–1)
BASOPHILS NFR BLD AUTO: 0.4 % — SIGNIFICANT CHANGE UP (ref 0–1)
BASOPHILS NFR BLD AUTO: 0.5 % — SIGNIFICANT CHANGE UP (ref 0–1)
BILIRUB SERPL-MCNC: 0.5 MG/DL
BILIRUB SERPL-MCNC: 0.5 MG/DL
BILIRUB SERPL-MCNC: 0.5 MG/DL — SIGNIFICANT CHANGE UP (ref 0.2–1.2)
BILIRUB SERPL-MCNC: 0.5 MG/DL — SIGNIFICANT CHANGE UP (ref 0.2–1.2)
BILIRUB SERPL-MCNC: 0.6 MG/DL — SIGNIFICANT CHANGE UP (ref 0.2–1.2)
BILIRUB UR-MCNC: NEGATIVE — SIGNIFICANT CHANGE UP
BILIRUBIN URINE: NEGATIVE
BLD GP AB SCN SERPL QL: NORMAL
BLD GP AB SCN SERPL QL: SIGNIFICANT CHANGE UP
BLOOD URINE: ABNORMAL
BUN SERPL-MCNC: 116 MG/DL — CRITICAL HIGH (ref 10–20)
BUN SERPL-MCNC: 138 MG/DL
BUN SERPL-MCNC: 24 MG/DL — HIGH (ref 10–20)
BUN SERPL-MCNC: 39 MG/DL
BUN SERPL-MCNC: 61 MG/DL — CRITICAL HIGH (ref 10–20)
BUN SERPL-MCNC: 79 MG/DL — CRITICAL HIGH (ref 10–20)
BUN SERPL-MCNC: 88 MG/DL — CRITICAL HIGH (ref 10–20)
C DIFF BY PCR RESULT: NEGATIVE — SIGNIFICANT CHANGE UP
C DIFF TOX GENS STL QL NAA+PROBE: SIGNIFICANT CHANGE UP
CA-I SERPL-SCNC: 1.13 MMOL/L — LOW (ref 1.15–1.33)
CALCIUM SERPL-MCNC: 7.6 MG/DL — LOW (ref 8.5–10.1)
CALCIUM SERPL-MCNC: 7.8 MG/DL — LOW (ref 8.5–10.1)
CALCIUM SERPL-MCNC: 8 MG/DL
CALCIUM SERPL-MCNC: 8.2 MG/DL — LOW (ref 8.5–10.1)
CALCIUM SERPL-MCNC: 8.5 MG/DL — SIGNIFICANT CHANGE UP (ref 8.5–10.1)
CALCIUM SERPL-MCNC: 8.6 MG/DL
CALCIUM SERPL-MCNC: 9.2 MG/DL — SIGNIFICANT CHANGE UP (ref 8.5–10.1)
CHLORIDE SERPL-SCNC: 100 MMOL/L — SIGNIFICANT CHANGE UP (ref 98–110)
CHLORIDE SERPL-SCNC: 101 MMOL/L
CHLORIDE SERPL-SCNC: 102 MMOL/L — SIGNIFICANT CHANGE UP (ref 98–110)
CHLORIDE SERPL-SCNC: 102 MMOL/L — SIGNIFICANT CHANGE UP (ref 98–110)
CHLORIDE SERPL-SCNC: 104 MMOL/L — SIGNIFICANT CHANGE UP (ref 98–110)
CHLORIDE SERPL-SCNC: 92 MMOL/L — LOW (ref 98–110)
CHLORIDE SERPL-SCNC: 99 MMOL/L
CO2 SERPL-SCNC: 18 MMOL/L
CO2 SERPL-SCNC: 19 MMOL/L — SIGNIFICANT CHANGE UP (ref 17–32)
CO2 SERPL-SCNC: 19 MMOL/L — SIGNIFICANT CHANGE UP (ref 17–32)
CO2 SERPL-SCNC: 20 MMOL/L — SIGNIFICANT CHANGE UP (ref 17–32)
CO2 SERPL-SCNC: 21 MMOL/L — SIGNIFICANT CHANGE UP (ref 17–32)
CO2 SERPL-SCNC: 24 MMOL/L
CO2 SERPL-SCNC: 26 MMOL/L — SIGNIFICANT CHANGE UP (ref 17–32)
COLOR SPEC: COLORLESS — SIGNIFICANT CHANGE UP
COLOR: NORMAL
CREAT SERPL-MCNC: 2.7 MG/DL — HIGH (ref 0.7–1.5)
CREAT SERPL-MCNC: 3.9 MG/DL
CREAT SERPL-MCNC: 6.4 MG/DL — CRITICAL HIGH (ref 0.7–1.5)
CREAT SERPL-MCNC: 7 MG/DL — CRITICAL HIGH (ref 0.7–1.5)
CREAT SERPL-MCNC: 7.5 MG/DL
CREAT SERPL-MCNC: 8 MG/DL — CRITICAL HIGH (ref 0.7–1.5)
CREAT SERPL-MCNC: 8.6 MG/DL — CRITICAL HIGH (ref 0.7–1.5)
CULTURE RESULTS: SIGNIFICANT CHANGE UP
CULTURE RESULTS: SIGNIFICANT CHANGE UP
DIFF PNL FLD: ABNORMAL
EOSINOPHIL # BLD AUTO: 0.01 K/UL — SIGNIFICANT CHANGE UP (ref 0–0.7)
EOSINOPHIL # BLD AUTO: 0.02 K/UL — SIGNIFICANT CHANGE UP (ref 0–0.7)
EOSINOPHIL # BLD AUTO: 0.07 K/UL — SIGNIFICANT CHANGE UP (ref 0–0.7)
EOSINOPHIL # BLD AUTO: 0.17 K/UL — SIGNIFICANT CHANGE UP (ref 0–0.7)
EOSINOPHIL # BLD AUTO: 0.34 K/UL — SIGNIFICANT CHANGE UP (ref 0–0.7)
EOSINOPHIL NFR BLD AUTO: 0.1 % — SIGNIFICANT CHANGE UP (ref 0–8)
EOSINOPHIL NFR BLD AUTO: 0.2 % — SIGNIFICANT CHANGE UP (ref 0–8)
EOSINOPHIL NFR BLD AUTO: 0.5 % — SIGNIFICANT CHANGE UP (ref 0–8)
EOSINOPHIL NFR BLD AUTO: 1.8 % — SIGNIFICANT CHANGE UP (ref 0–8)
EOSINOPHIL NFR BLD AUTO: 4.3 % — SIGNIFICANT CHANGE UP (ref 0–8)
EPI CELLS # UR: 4 /HPF — SIGNIFICANT CHANGE UP (ref 0–5)
ESTIMATED AVERAGE GLUCOSE: 120 MG/DL — HIGH (ref 68–114)
ESTIMATED AVERAGE GLUCOSE: 123 MG/DL — HIGH (ref 68–114)
FOLATE SERPL-MCNC: >20 NG/ML
GAS PNL BLDV: 138 MMOL/L — SIGNIFICANT CHANGE UP (ref 136–145)
GAS PNL BLDV: SIGNIFICANT CHANGE UP
GIANT PLATELETS BLD QL SMEAR: PRESENT — SIGNIFICANT CHANGE UP
GIANT PLATELETS BLD QL SMEAR: PRESENT — SIGNIFICANT CHANGE UP
GLUCOSE BLDC GLUCOMTR-MCNC: 132 MG/DL — HIGH (ref 70–99)
GLUCOSE BLDC GLUCOMTR-MCNC: 158 MG/DL — HIGH (ref 70–99)
GLUCOSE BLDC GLUCOMTR-MCNC: 179 MG/DL — HIGH (ref 70–99)
GLUCOSE BLDC GLUCOMTR-MCNC: 185 MG/DL — HIGH (ref 70–99)
GLUCOSE BLDC GLUCOMTR-MCNC: 190 MG/DL — HIGH (ref 70–99)
GLUCOSE BLDC GLUCOMTR-MCNC: 198 MG/DL — HIGH (ref 70–99)
GLUCOSE BLDC GLUCOMTR-MCNC: 268 MG/DL — HIGH (ref 70–99)
GLUCOSE BLDC GLUCOMTR-MCNC: 286 MG/DL — HIGH (ref 70–99)
GLUCOSE BLDC GLUCOMTR-MCNC: 61 MG/DL — LOW (ref 70–99)
GLUCOSE QUALITATIVE U: NEGATIVE
GLUCOSE SERPL-MCNC: 120 MG/DL — HIGH (ref 70–99)
GLUCOSE SERPL-MCNC: 156 MG/DL — HIGH (ref 70–99)
GLUCOSE SERPL-MCNC: 183 MG/DL
GLUCOSE SERPL-MCNC: 186 MG/DL — HIGH (ref 70–99)
GLUCOSE SERPL-MCNC: 57 MG/DL — LOW (ref 70–99)
GLUCOSE SERPL-MCNC: 85 MG/DL — SIGNIFICANT CHANGE UP (ref 70–99)
GLUCOSE SERPL-MCNC: 90 MG/DL
GLUCOSE UR QL: NEGATIVE — SIGNIFICANT CHANGE UP
HCO3 BLDV-SCNC: 23 MMOL/L — SIGNIFICANT CHANGE UP (ref 22–29)
HCT VFR BLD CALC: 17.3 %
HCT VFR BLD CALC: 18.8 % — LOW (ref 42–52)
HCT VFR BLD CALC: 19.6 %
HCT VFR BLD CALC: 20.3 %
HCT VFR BLD CALC: 21.2 %
HCT VFR BLD CALC: 22.7 %
HCT VFR BLD CALC: 23.1 %
HCT VFR BLD CALC: 23.4 %
HCT VFR BLD CALC: 23.5 %
HCT VFR BLD CALC: 23.5 %
HCT VFR BLD CALC: 23.7 %
HCT VFR BLD CALC: 23.7 %
HCT VFR BLD CALC: 23.9 % — LOW (ref 42–52)
HCT VFR BLD CALC: 24.1 %
HCT VFR BLD CALC: 24.1 % — LOW (ref 42–52)
HCT VFR BLD CALC: 24.6 %
HCT VFR BLD CALC: 24.7 % — LOW (ref 42–52)
HCT VFR BLD CALC: 24.9 %
HCT VFR BLD CALC: 25.2 %
HCT VFR BLD CALC: 25.7 % — LOW (ref 42–52)
HCT VFR BLD CALC: 25.8 %
HCT VFR BLD CALC: 27 %
HCT VFR BLD CALC: 27.5 %
HCT VFR BLDA CALC: 25 % — LOW (ref 39–51)
HCV AB S/CO SERPL IA: 0.05 COI — SIGNIFICANT CHANGE UP
HCV AB SERPL-IMP: SIGNIFICANT CHANGE UP
HGB BLD CALC-MCNC: 8.2 G/DL — LOW (ref 12.6–17.4)
HGB BLD-MCNC: 5.5 G/DL
HGB BLD-MCNC: 5.9 G/DL — CRITICAL LOW (ref 14–18)
HGB BLD-MCNC: 6.2 G/DL
HGB BLD-MCNC: 6.3 G/DL
HGB BLD-MCNC: 6.8 G/DL
HGB BLD-MCNC: 7 G/DL
HGB BLD-MCNC: 7.1 G/DL
HGB BLD-MCNC: 7.2 G/DL
HGB BLD-MCNC: 7.2 G/DL
HGB BLD-MCNC: 7.3 G/DL
HGB BLD-MCNC: 7.3 G/DL
HGB BLD-MCNC: 7.4 G/DL
HGB BLD-MCNC: 7.4 G/DL — LOW (ref 14–18)
HGB BLD-MCNC: 7.5 G/DL
HGB BLD-MCNC: 7.5 G/DL
HGB BLD-MCNC: 7.5 G/DL — LOW (ref 14–18)
HGB BLD-MCNC: 7.7 G/DL — LOW (ref 14–18)
HGB BLD-MCNC: 7.8 G/DL
HGB BLD-MCNC: 7.8 G/DL — LOW (ref 14–18)
HGB BLD-MCNC: 7.9 G/DL
HGB BLD-MCNC: 8.1 G/DL
HGB BLD-MCNC: 8.5 G/DL
HGB BLD-MCNC: 8.7 G/DL
HYALINE CASTS # UR AUTO: 2 /LPF — SIGNIFICANT CHANGE UP (ref 0–7)
HYPOCHROMIA BLD QL: SLIGHT — SIGNIFICANT CHANGE UP
IMM GRANULOCYTES NFR BLD AUTO: 21.5 % — HIGH (ref 0.1–0.3)
IMM GRANULOCYTES NFR BLD AUTO: 5.5 % — HIGH (ref 0.1–0.3)
IMM GRANULOCYTES NFR BLD AUTO: 6.9 % — HIGH (ref 0.1–0.3)
INR BLD: 1.3 RATIO — SIGNIFICANT CHANGE UP (ref 0.65–1.3)
INR BLD: 1.62 RATIO — HIGH (ref 0.65–1.3)
INR BLD: 3.5 RATIO — HIGH (ref 0.65–1.3)
INR PPP: 1.4 RATIO
INR PPP: 1.5 RATIO
INR PPP: 1.7 RATIO
INR PPP: 2 RATIO
INR PPP: 2.3 RATIO
INR PPP: 2.7 RATIO
INR PPP: 2.8 RATIO
KETONES UR-MCNC: NEGATIVE — SIGNIFICANT CHANGE UP
KETONES URINE: NEGATIVE
LACTATE BLDV-MCNC: 1.5 MMOL/L — SIGNIFICANT CHANGE UP (ref 0.5–2)
LACTATE SERPL-SCNC: 1.9 MMOL/L — SIGNIFICANT CHANGE UP (ref 0.7–2)
LDH SERPL-CCNC: 228 U/L
LDH SERPL-CCNC: 446 U/L
LEUKOCYTE ESTERASE UR-ACNC: ABNORMAL
LEUKOCYTE ESTERASE URINE: ABNORMAL
LYMPHOCYTES # BLD AUTO: 0.34 K/UL — LOW (ref 1.2–3.4)
LYMPHOCYTES # BLD AUTO: 0.49 K/UL — LOW (ref 1.2–3.4)
LYMPHOCYTES # BLD AUTO: 0.6 K/UL — LOW (ref 1.2–3.4)
LYMPHOCYTES # BLD AUTO: 0.61 K/UL — LOW (ref 1.2–3.4)
LYMPHOCYTES # BLD AUTO: 1.31 K/UL — SIGNIFICANT CHANGE UP (ref 1.2–3.4)
LYMPHOCYTES # BLD AUTO: 14.1 % — LOW (ref 20.5–51.1)
LYMPHOCYTES # BLD AUTO: 3.2 % — LOW (ref 20.5–51.1)
LYMPHOCYTES # BLD AUTO: 3.3 % — LOW (ref 20.5–51.1)
LYMPHOCYTES # BLD AUTO: 4.3 % — LOW (ref 20.5–51.1)
LYMPHOCYTES # BLD AUTO: 4.9 % — LOW (ref 20.5–51.1)
MACROCYTES BLD QL: SIGNIFICANT CHANGE UP
MACROCYTES BLD QL: SLIGHT — SIGNIFICANT CHANGE UP
MANUAL SMEAR VERIFICATION: SIGNIFICANT CHANGE UP
MCHC RBC-ENTMCNC: 30.3 G/DL
MCHC RBC-ENTMCNC: 30.4 G/DL
MCHC RBC-ENTMCNC: 30.4 G/DL — LOW (ref 32–37)
MCHC RBC-ENTMCNC: 30.5 G/DL
MCHC RBC-ENTMCNC: 30.6 G/DL
MCHC RBC-ENTMCNC: 30.8 G/DL
MCHC RBC-ENTMCNC: 30.8 G/DL
MCHC RBC-ENTMCNC: 30.9 G/DL
MCHC RBC-ENTMCNC: 31 G/DL
MCHC RBC-ENTMCNC: 31 G/DL — LOW (ref 32–37)
MCHC RBC-ENTMCNC: 31.1 G/DL
MCHC RBC-ENTMCNC: 31.1 G/DL — LOW (ref 32–37)
MCHC RBC-ENTMCNC: 31.2 G/DL — LOW (ref 32–37)
MCHC RBC-ENTMCNC: 31.3 G/DL
MCHC RBC-ENTMCNC: 31.3 G/DL
MCHC RBC-ENTMCNC: 31.4 G/DL
MCHC RBC-ENTMCNC: 31.4 G/DL — LOW (ref 32–37)
MCHC RBC-ENTMCNC: 31.5 G/DL
MCHC RBC-ENTMCNC: 31.6 G/DL
MCHC RBC-ENTMCNC: 31.6 G/DL
MCHC RBC-ENTMCNC: 31.8 G/DL
MCHC RBC-ENTMCNC: 32.1 G/DL
MCHC RBC-ENTMCNC: 32.2 G/DL
MCHC RBC-ENTMCNC: 32.6 PG
MCHC RBC-ENTMCNC: 32.8 PG
MCHC RBC-ENTMCNC: 32.9 PG
MCHC RBC-ENTMCNC: 33 PG
MCHC RBC-ENTMCNC: 33 PG — HIGH (ref 27–31)
MCHC RBC-ENTMCNC: 33 PG — HIGH (ref 27–31)
MCHC RBC-ENTMCNC: 33.2 PG
MCHC RBC-ENTMCNC: 33.2 PG — HIGH (ref 27–31)
MCHC RBC-ENTMCNC: 33.2 PG — HIGH (ref 27–31)
MCHC RBC-ENTMCNC: 33.3 PG
MCHC RBC-ENTMCNC: 33.3 PG — HIGH (ref 27–31)
MCHC RBC-ENTMCNC: 33.5 PG
MCHC RBC-ENTMCNC: 33.6 PG
MCHC RBC-ENTMCNC: 33.8 PG
MCHC RBC-ENTMCNC: 34 PG
MCHC RBC-ENTMCNC: 34.1 PG
MCHC RBC-ENTMCNC: 34.1 PG
MCHC RBC-ENTMCNC: 35.2 PG
MCV RBC AUTO: 102.4 FL
MCV RBC AUTO: 104.2 FL
MCV RBC AUTO: 105 FL — HIGH (ref 80–94)
MCV RBC AUTO: 105.1 FL
MCV RBC AUTO: 105.4 FL
MCV RBC AUTO: 106.2 FL
MCV RBC AUTO: 106.2 FL — HIGH (ref 80–94)
MCV RBC AUTO: 106.3 FL
MCV RBC AUTO: 106.5 FL — HIGH (ref 80–94)
MCV RBC AUTO: 106.6 FL
MCV RBC AUTO: 106.6 FL
MCV RBC AUTO: 106.8 FL
MCV RBC AUTO: 106.8 FL
MCV RBC AUTO: 106.9 FL
MCV RBC AUTO: 107.3 FL
MCV RBC AUTO: 107.4 FL
MCV RBC AUTO: 107.7 FL — HIGH (ref 80–94)
MCV RBC AUTO: 108 FL
MCV RBC AUTO: 108.6 FL
MCV RBC AUTO: 109.4 FL — HIGH (ref 80–94)
MCV RBC AUTO: 111.4 FL
MCV RBC AUTO: 111.4 FL
MCV RBC AUTO: 112.3 FL
METAMYELOCYTES # FLD: 1 % — HIGH (ref 0–0)
MONOCYTES # BLD AUTO: 0.49 K/UL — SIGNIFICANT CHANGE UP (ref 0.1–0.6)
MONOCYTES # BLD AUTO: 0.96 K/UL — HIGH (ref 0.1–0.6)
MONOCYTES # BLD AUTO: 1.2 K/UL — HIGH (ref 0.1–0.6)
MONOCYTES # BLD AUTO: 1.56 K/UL — HIGH (ref 0.1–0.6)
MONOCYTES # BLD AUTO: 1.66 K/UL — HIGH (ref 0.1–0.6)
MONOCYTES NFR BLD AUTO: 12.2 % — HIGH (ref 1.7–9.3)
MONOCYTES NFR BLD AUTO: 13.7 % — HIGH (ref 1.7–9.3)
MONOCYTES NFR BLD AUTO: 5.3 % — SIGNIFICANT CHANGE UP (ref 1.7–9.3)
MONOCYTES NFR BLD AUTO: 8.1 % — SIGNIFICANT CHANGE UP (ref 1.7–9.3)
MONOCYTES NFR BLD AUTO: 8.1 % — SIGNIFICANT CHANGE UP (ref 1.7–9.3)
MRSA PCR RESULT.: NEGATIVE — SIGNIFICANT CHANGE UP
MYELOCYTES NFR BLD: 0.9 % — HIGH (ref 0–0)
NEUTROPHILS # BLD AUTO: 12 K/UL — HIGH (ref 1.4–6.5)
NEUTROPHILS # BLD AUTO: 15.98 K/UL — HIGH (ref 1.4–6.5)
NEUTROPHILS # BLD AUTO: 6.15 K/UL — SIGNIFICANT CHANGE UP (ref 1.4–6.5)
NEUTROPHILS # BLD AUTO: 7.19 K/UL — HIGH (ref 1.4–6.5)
NEUTROPHILS # BLD AUTO: 7.32 K/UL — HIGH (ref 1.4–6.5)
NEUTROPHILS NFR BLD AUTO: 59.2 % — SIGNIFICANT CHANGE UP (ref 42.2–75.2)
NEUTROPHILS NFR BLD AUTO: 78.3 % — HIGH (ref 42.2–75.2)
NEUTROPHILS NFR BLD AUTO: 78.8 % — HIGH (ref 42.2–75.2)
NEUTROPHILS NFR BLD AUTO: 80.8 % — HIGH (ref 42.2–75.2)
NEUTROPHILS NFR BLD AUTO: 82.8 % — HIGH (ref 42.2–75.2)
NEUTS BAND # BLD: 5 % — SIGNIFICANT CHANGE UP (ref 0–6)
NITRITE UR-MCNC: NEGATIVE — SIGNIFICANT CHANGE UP
NITRITE URINE: NEGATIVE
NRBC # BLD: 0 /100 WBCS — SIGNIFICANT CHANGE UP (ref 0–0)
NRBC # BLD: 0 /100 — SIGNIFICANT CHANGE UP (ref 0–0)
NRBC # BLD: 1 /100 — HIGH (ref 0–0)
NRBC # BLD: SIGNIFICANT CHANGE UP /100 WBCS (ref 0–0)
OVALOCYTES BLD QL SMEAR: SLIGHT — SIGNIFICANT CHANGE UP
PCO2 BLDV: 46 MMHG — SIGNIFICANT CHANGE UP (ref 42–55)
PH BLDV: 7.31 — LOW (ref 7.32–7.43)
PH UR: 6.5 — SIGNIFICANT CHANGE UP (ref 5–8)
PH URINE: 7
PLAT MORPH BLD: NORMAL — SIGNIFICANT CHANGE UP
PLATELET # BLD AUTO: 112 K/UL
PLATELET # BLD AUTO: 117 K/UL
PLATELET # BLD AUTO: 118 K/UL
PLATELET # BLD AUTO: 121 K/UL
PLATELET # BLD AUTO: 127 K/UL
PLATELET # BLD AUTO: 128 K/UL
PLATELET # BLD AUTO: 133 K/UL
PLATELET # BLD AUTO: 135 K/UL
PLATELET # BLD AUTO: 140 K/UL
PLATELET # BLD AUTO: 141 K/UL
PLATELET # BLD AUTO: 143 K/UL
PLATELET # BLD AUTO: 149 K/UL
PLATELET # BLD AUTO: 156 K/UL — SIGNIFICANT CHANGE UP (ref 130–400)
PLATELET # BLD AUTO: 158 K/UL
PLATELET # BLD AUTO: 164 K/UL — SIGNIFICANT CHANGE UP (ref 130–400)
PLATELET # BLD AUTO: 168 K/UL
PLATELET # BLD AUTO: 168 K/UL
PLATELET # BLD AUTO: 170 K/UL
PLATELET # BLD AUTO: 171 K/UL — SIGNIFICANT CHANGE UP (ref 130–400)
PLATELET # BLD AUTO: 174 K/UL
PLATELET # BLD AUTO: 175 K/UL — SIGNIFICANT CHANGE UP (ref 130–400)
PLATELET # BLD AUTO: 185 K/UL — SIGNIFICANT CHANGE UP (ref 130–400)
PLATELET # BLD AUTO: 209 K/UL
PMV BLD: 10 FL
PMV BLD: 10.2 FL
PMV BLD: 10.2 FL
PMV BLD: 10.4 FL
PMV BLD: 10.9 FL
PMV BLD: 11 FL
PMV BLD: 11.3 FL
PMV BLD: 11.4 FL
PMV BLD: 11.5 FL
PMV BLD: 11.7 FL
PMV BLD: 11.8 FL
PMV BLD: 11.9 FL
PMV BLD: 11.9 FL
PMV BLD: 12.4 FL
PO2 BLDV: 21 MMHG — SIGNIFICANT CHANGE UP
POCT-PROTHROMBIN TIME: 17 SECS
POCT-PROTHROMBIN TIME: 18.2 SECS
POCT-PROTHROMBIN TIME: 20.7 SECS
POCT-PROTHROMBIN TIME: 24.6 SECS
POCT-PROTHROMBIN TIME: 27.8 SECS
POCT-PROTHROMBIN TIME: 32.7 SECS
POCT-PROTHROMBIN TIME: 33.1 SECS
POIKILOCYTOSIS BLD QL AUTO: SLIGHT — SIGNIFICANT CHANGE UP
POIKILOCYTOSIS BLD QL AUTO: SLIGHT — SIGNIFICANT CHANGE UP
POLYCHROMASIA BLD QL SMEAR: SIGNIFICANT CHANGE UP
POLYCHROMASIA BLD QL SMEAR: SLIGHT — SIGNIFICANT CHANGE UP
POTASSIUM BLDV-SCNC: 3.8 MMOL/L — SIGNIFICANT CHANGE UP (ref 3.5–5.1)
POTASSIUM SERPL-MCNC: 3.3 MMOL/L — LOW (ref 3.5–5)
POTASSIUM SERPL-MCNC: 4.3 MMOL/L — SIGNIFICANT CHANGE UP (ref 3.5–5)
POTASSIUM SERPL-MCNC: 4.7 MMOL/L — SIGNIFICANT CHANGE UP (ref 3.5–5)
POTASSIUM SERPL-MCNC: 4.8 MMOL/L — SIGNIFICANT CHANGE UP (ref 3.5–5)
POTASSIUM SERPL-MCNC: 4.8 MMOL/L — SIGNIFICANT CHANGE UP (ref 3.5–5)
POTASSIUM SERPL-SCNC: 3.3 MMOL/L — LOW (ref 3.5–5)
POTASSIUM SERPL-SCNC: 3.7 MMOL/L
POTASSIUM SERPL-SCNC: 4.3 MMOL/L
POTASSIUM SERPL-SCNC: 4.3 MMOL/L — SIGNIFICANT CHANGE UP (ref 3.5–5)
POTASSIUM SERPL-SCNC: 4.7 MMOL/L — SIGNIFICANT CHANGE UP (ref 3.5–5)
POTASSIUM SERPL-SCNC: 4.8 MMOL/L — SIGNIFICANT CHANGE UP (ref 3.5–5)
POTASSIUM SERPL-SCNC: 4.8 MMOL/L — SIGNIFICANT CHANGE UP (ref 3.5–5)
PROT SERPL-MCNC: 5.4 G/DL — LOW (ref 6–8)
PROT SERPL-MCNC: 5.6 G/DL — LOW (ref 6–8)
PROT SERPL-MCNC: 6 G/DL — SIGNIFICANT CHANGE UP (ref 6–8)
PROT SERPL-MCNC: 6.3 G/DL
PROT SERPL-MCNC: 6.5 G/DL
PROT SERPL-MCNC: 6.7 G/DL — SIGNIFICANT CHANGE UP (ref 6–8)
PROT SERPL-MCNC: 7.5 G/DL — SIGNIFICANT CHANGE UP (ref 6–8)
PROT UR-MCNC: ABNORMAL
PROTEIN URINE: ABNORMAL
PROTHROM AB SERPL-ACNC: 14.9 SEC — HIGH (ref 9.95–12.87)
PROTHROM AB SERPL-ACNC: 18.5 SEC — HIGH (ref 9.95–12.87)
PROTHROM AB SERPL-ACNC: 39.7 SEC — HIGH (ref 9.95–12.87)
QUALITY CONTROL: YES
RBC # BLD: 1.62 M/UL
RBC # BLD: 1.76 M/UL
RBC # BLD: 1.79 M/UL — LOW (ref 4.7–6.1)
RBC # BLD: 1.88 M/UL
RBC # BLD: 2.07 M/UL
RBC # BLD: 2.09 M/UL
RBC # BLD: 2.11 M/UL
RBC # BLD: 2.11 M/UL
RBC # BLD: 2.16 M/UL
RBC # BLD: 2.18 M/UL
RBC # BLD: 2.22 M/UL
RBC # BLD: 2.22 M/UL — LOW (ref 4.7–6.1)
RBC # BLD: 2.23 M/UL
RBC # BLD: 2.27 M/UL
RBC # BLD: 2.27 M/UL — LOW (ref 4.7–6.1)
RBC # BLD: 2.29 M/UL
RBC # BLD: 2.32 M/UL — LOW (ref 4.7–6.1)
RBC # BLD: 2.35 M/UL — LOW (ref 4.7–6.1)
RBC # BLD: 2.37 M/UL
RBC # BLD: 2.37 M/UL
RBC # BLD: 2.42 M/UL
RBC # BLD: 2.58 M/UL
RBC # BLD: 2.59 M/UL
RBC # FLD: 17.4 %
RBC # FLD: 17.6 %
RBC # FLD: 17.7 %
RBC # FLD: 17.9 %
RBC # FLD: 17.9 %
RBC # FLD: 18 %
RBC # FLD: 18 %
RBC # FLD: 18.1 %
RBC # FLD: 18.1 % — HIGH (ref 11.5–14.5)
RBC # FLD: 18.2 %
RBC # FLD: 18.6 %
RBC # FLD: 18.7 %
RBC # FLD: 18.8 %
RBC # FLD: 19.4 %
RBC # FLD: 19.6 %
RBC # FLD: 19.9 %
RBC # FLD: 19.9 %
RBC # FLD: 20.4 % — HIGH (ref 11.5–14.5)
RBC # FLD: 24.1 % — HIGH (ref 11.5–14.5)
RBC # FLD: 24.2 % — HIGH (ref 11.5–14.5)
RBC # FLD: 24.3 %
RBC # FLD: 24.8 % — HIGH (ref 11.5–14.5)
RBC # FLD: 25 %
RBC BLD AUTO: ABNORMAL
RBC CASTS # UR COMP ASSIST: 466 /HPF — HIGH (ref 0–4)
RETICS # AUTO: 5.8 %
RETICS AGGREG/RBC NFR: 109.8 K/UL
SAO2 % BLDV: 28.1 % — SIGNIFICANT CHANGE UP
SARS-COV-2 RNA SPEC QL NAA+PROBE: SIGNIFICANT CHANGE UP
SODIUM SERPL-SCNC: 136 MMOL/L — SIGNIFICANT CHANGE UP (ref 135–146)
SODIUM SERPL-SCNC: 137 MMOL/L — SIGNIFICANT CHANGE UP (ref 135–146)
SODIUM SERPL-SCNC: 138 MMOL/L — SIGNIFICANT CHANGE UP (ref 135–146)
SODIUM SERPL-SCNC: 139 MMOL/L
SODIUM SERPL-SCNC: 139 MMOL/L
SODIUM SERPL-SCNC: 140 MMOL/L — SIGNIFICANT CHANGE UP (ref 135–146)
SODIUM SERPL-SCNC: 141 MMOL/L — SIGNIFICANT CHANGE UP (ref 135–146)
SP GR SPEC: 1.01 — SIGNIFICANT CHANGE UP (ref 1.01–1.03)
SPECIFIC GRAVITY URINE: 1.01
SPECIMEN SOURCE: SIGNIFICANT CHANGE UP
SPECIMEN SOURCE: SIGNIFICANT CHANGE UP
SURGICAL PATHOLOGY STUDY: SIGNIFICANT CHANGE UP
UROBILINOGEN FLD QL: SIGNIFICANT CHANGE UP
UROBILINOGEN URINE: NORMAL
WBC # BLD: 12.14 K/UL — HIGH (ref 4.8–10.8)
WBC # BLD: 14.85 K/UL — HIGH (ref 4.8–10.8)
WBC # BLD: 19.27 K/UL — HIGH (ref 4.8–10.8)
WBC # BLD: 7.85 K/UL — SIGNIFICANT CHANGE UP (ref 4.8–10.8)
WBC # BLD: 9.29 K/UL — SIGNIFICANT CHANGE UP (ref 4.8–10.8)
WBC # FLD AUTO: 10.19 K/UL
WBC # FLD AUTO: 12.14 K/UL — HIGH (ref 4.8–10.8)
WBC # FLD AUTO: 12.24 K/UL
WBC # FLD AUTO: 14.85 K/UL — HIGH (ref 4.8–10.8)
WBC # FLD AUTO: 19.27 K/UL — HIGH (ref 4.8–10.8)
WBC # FLD AUTO: 5.96 K/UL
WBC # FLD AUTO: 6.2 K/UL
WBC # FLD AUTO: 6.22 K/UL
WBC # FLD AUTO: 6.91 K/UL
WBC # FLD AUTO: 7.43 K/UL
WBC # FLD AUTO: 7.71 K/UL
WBC # FLD AUTO: 7.85 K/UL — SIGNIFICANT CHANGE UP (ref 4.8–10.8)
WBC # FLD AUTO: 8 K/UL
WBC # FLD AUTO: 8.01 K/UL
WBC # FLD AUTO: 8.02 K/UL
WBC # FLD AUTO: 8.04 K/UL
WBC # FLD AUTO: 8.22 K/UL
WBC # FLD AUTO: 8.4 K/UL
WBC # FLD AUTO: 8.6 K/UL
WBC # FLD AUTO: 8.69 K/UL
WBC # FLD AUTO: 9.01 K/UL
WBC # FLD AUTO: 9.29 K/UL — SIGNIFICANT CHANGE UP (ref 4.8–10.8)
WBC # FLD AUTO: 9.7 K/UL
WBC UR QL: 34 /HPF — HIGH (ref 0–5)

## 2021-01-01 PROCEDURE — 99213 OFFICE O/P EST LOW 20 MIN: CPT

## 2021-01-01 PROCEDURE — 99284 EMERGENCY DEPT VISIT MOD MDM: CPT

## 2021-01-01 PROCEDURE — 99214 OFFICE O/P EST MOD 30 MIN: CPT

## 2021-01-01 PROCEDURE — 93010 ELECTROCARDIOGRAM REPORT: CPT

## 2021-01-01 PROCEDURE — 99232 SBSQ HOSP IP/OBS MODERATE 35: CPT

## 2021-01-01 PROCEDURE — 51720 TREATMENT OF BLADDER LESION: CPT

## 2021-01-01 PROCEDURE — 74176 CT ABD & PELVIS W/O CONTRAST: CPT | Mod: 26,MA

## 2021-01-01 PROCEDURE — 88305 TISSUE EXAM BY PATHOLOGIST: CPT | Mod: 26

## 2021-01-01 PROCEDURE — 52000 CYSTOURETHROSCOPY: CPT

## 2021-01-01 PROCEDURE — 52240 CYSTOSCOPY AND TREATMENT: CPT

## 2021-01-01 PROCEDURE — 71045 X-RAY EXAM CHEST 1 VIEW: CPT | Mod: 26

## 2021-01-01 PROCEDURE — 99238 HOSP IP/OBS DSCHRG MGMT 30/<: CPT

## 2021-01-01 PROCEDURE — 78815 PET IMAGE W/CT SKULL-THIGH: CPT | Mod: 26,PS,MH

## 2021-01-01 PROCEDURE — 99285 EMERGENCY DEPT VISIT HI MDM: CPT

## 2021-01-01 PROCEDURE — 99203 OFFICE O/P NEW LOW 30 MIN: CPT

## 2021-01-01 PROCEDURE — 78306 BONE IMAGING WHOLE BODY: CPT | Mod: 26,MH

## 2021-01-01 PROCEDURE — 99215 OFFICE O/P EST HI 40 MIN: CPT | Mod: 25

## 2021-01-01 PROCEDURE — 86077 PHYS BLOOD BANK SERV XMATCH: CPT

## 2021-01-01 PROCEDURE — 78803 RP LOCLZJ TUM SPECT 1 AREA: CPT | Mod: 26,MH

## 2021-01-01 RX ORDER — ERYTHROPOIETIN 10000 [IU]/ML
40000 INJECTION, SOLUTION INTRAVENOUS; SUBCUTANEOUS ONCE
Refills: 0 | Status: COMPLETED | OUTPATIENT
Start: 2021-01-01 | End: 2021-01-01

## 2021-01-01 RX ORDER — CEPHALEXIN 500 MG
1 CAPSULE ORAL
Qty: 12 | Refills: 0
Start: 2021-01-01 | End: 2021-01-01

## 2021-01-01 RX ORDER — FERUMOXYTOL 510 MG/17ML
510 INJECTION INTRAVENOUS ONCE
Refills: 0 | Status: COMPLETED | OUTPATIENT
Start: 2021-01-01 | End: 2021-01-01

## 2021-01-01 RX ORDER — SODIUM CHLORIDE 9 MG/ML
1000 INJECTION, SOLUTION INTRAVENOUS
Refills: 0 | Status: DISCONTINUED | OUTPATIENT
Start: 2021-01-01 | End: 2021-01-01

## 2021-01-01 RX ORDER — DEXTROSE 50 % IN WATER 50 %
25 SYRINGE (ML) INTRAVENOUS ONCE
Refills: 0 | Status: DISCONTINUED | OUTPATIENT
Start: 2021-01-01 | End: 2021-01-01

## 2021-01-01 RX ORDER — METRONIDAZOLE 500 MG
TABLET ORAL
Refills: 0 | Status: DISCONTINUED | OUTPATIENT
Start: 2021-01-01 | End: 2021-01-01

## 2021-01-01 RX ORDER — SODIUM CHLORIDE 9 MG/ML
1000 INJECTION INTRAMUSCULAR; INTRAVENOUS; SUBCUTANEOUS
Refills: 0 | Status: DISCONTINUED | OUTPATIENT
Start: 2021-01-01 | End: 2021-01-01

## 2021-01-01 RX ORDER — AMLODIPINE BESYLATE 2.5 MG/1
1 TABLET ORAL
Qty: 0 | Refills: 0 | DISCHARGE

## 2021-01-01 RX ORDER — IRON SUCROSE 20 MG/ML
100 INJECTION, SOLUTION INTRAVENOUS
Refills: 0 | Status: DISCONTINUED | OUTPATIENT
Start: 2021-01-01 | End: 2021-01-01

## 2021-01-01 RX ORDER — ERYTHROPOIETIN 10000 [IU]/ML
30000 INJECTION, SOLUTION INTRAVENOUS; SUBCUTANEOUS ONCE
Refills: 0 | Status: COMPLETED | OUTPATIENT
Start: 2021-01-01 | End: 2021-01-01

## 2021-01-01 RX ORDER — CEFEPIME 1 G/1
500 INJECTION, POWDER, FOR SOLUTION INTRAMUSCULAR; INTRAVENOUS EVERY 24 HOURS
Refills: 0 | Status: DISCONTINUED | OUTPATIENT
Start: 2021-01-01 | End: 2021-01-01

## 2021-01-01 RX ORDER — ONDANSETRON 8 MG/1
4 TABLET, FILM COATED ORAL EVERY 8 HOURS
Refills: 0 | Status: DISCONTINUED | OUTPATIENT
Start: 2021-01-01 | End: 2021-01-01

## 2021-01-01 RX ORDER — VANCOMYCIN HCL 1 G
1000 VIAL (EA) INTRAVENOUS ONCE
Refills: 0 | Status: COMPLETED | OUTPATIENT
Start: 2021-01-01 | End: 2021-01-01

## 2021-01-01 RX ORDER — WARFARIN SODIUM 2.5 MG/1
1 TABLET ORAL
Qty: 0 | Refills: 0 | DISCHARGE

## 2021-01-01 RX ORDER — GLIMEPIRIDE 1 MG
1 TABLET ORAL
Qty: 0 | Refills: 0 | DISCHARGE

## 2021-01-01 RX ORDER — METOPROLOL TARTRATE 50 MG
1 TABLET ORAL
Qty: 0 | Refills: 0 | DISCHARGE

## 2021-01-01 RX ORDER — METRONIDAZOLE 500 MG
500 TABLET ORAL EVERY 8 HOURS
Refills: 0 | Status: DISCONTINUED | OUTPATIENT
Start: 2021-01-01 | End: 2021-01-01

## 2021-01-01 RX ORDER — GLIMEPIRIDE 4 MG/1
4 TABLET ORAL DAILY
Refills: 0 | Status: DISCONTINUED | COMMUNITY
End: 2021-01-01

## 2021-01-01 RX ORDER — DEXTROSE 50 % IN WATER 50 %
50 SYRINGE (ML) INTRAVENOUS ONCE
Refills: 0 | Status: COMPLETED | OUTPATIENT
Start: 2021-01-01 | End: 2021-01-01

## 2021-01-01 RX ORDER — DEXTROSE 50 % IN WATER 50 %
12.5 SYRINGE (ML) INTRAVENOUS ONCE
Refills: 0 | Status: DISCONTINUED | OUTPATIENT
Start: 2021-01-01 | End: 2021-01-01

## 2021-01-01 RX ORDER — INSULIN LISPRO 100/ML
VIAL (ML) SUBCUTANEOUS
Refills: 0 | Status: DISCONTINUED | OUTPATIENT
Start: 2021-01-01 | End: 2021-01-01

## 2021-01-01 RX ORDER — ASPIRIN/CALCIUM CARB/MAGNESIUM 324 MG
1 TABLET ORAL
Qty: 0 | Refills: 0 | DISCHARGE

## 2021-01-01 RX ORDER — DEXTROSE 50 % IN WATER 50 %
15 SYRINGE (ML) INTRAVENOUS ONCE
Refills: 0 | Status: DISCONTINUED | OUTPATIENT
Start: 2021-01-01 | End: 2021-01-01

## 2021-01-01 RX ORDER — ONDANSETRON 8 MG/1
4 TABLET, FILM COATED ORAL ONCE
Refills: 0 | Status: DISCONTINUED | OUTPATIENT
Start: 2021-01-01 | End: 2021-01-01

## 2021-01-01 RX ORDER — COLCHICINE 0.6 MG/1
TABLET ORAL
Refills: 0 | Status: ACTIVE | COMMUNITY

## 2021-01-01 RX ORDER — BACILLUS CALMETTE-GUERIN SUBSTRAIN CONNAUGHT LIVE ANTIGEN 81 MG/3ML
50 INJECTION, POWDER, LYOPHILIZED, FOR SOLUTION INTRAVESICAL ONCE
Refills: 0 | Status: COMPLETED | OUTPATIENT
Start: 2021-01-01 | End: 2021-01-01

## 2021-01-01 RX ORDER — CHOLECALCIFEROL (VITAMIN D3) 125 MCG
0 CAPSULE ORAL
Qty: 0 | Refills: 0 | DISCHARGE

## 2021-01-01 RX ORDER — LANOLIN ALCOHOL/MO/W.PET/CERES
3 CREAM (GRAM) TOPICAL AT BEDTIME
Refills: 0 | Status: DISCONTINUED | OUTPATIENT
Start: 2021-01-01 | End: 2021-01-01

## 2021-01-01 RX ORDER — PREGABALIN 225 MG/1
1000 CAPSULE ORAL ONCE
Refills: 0 | Status: COMPLETED | OUTPATIENT
Start: 2021-01-01 | End: 2021-01-01

## 2021-01-01 RX ORDER — INSULIN GLARGINE 100 [IU]/ML
18 INJECTION, SOLUTION SUBCUTANEOUS
Qty: 0 | Refills: 0 | DISCHARGE

## 2021-01-01 RX ORDER — CEFEPIME 1 G/1
2000 INJECTION, POWDER, FOR SOLUTION INTRAMUSCULAR; INTRAVENOUS ONCE
Refills: 0 | Status: COMPLETED | OUTPATIENT
Start: 2021-01-01 | End: 2021-01-01

## 2021-01-01 RX ORDER — BUMETANIDE 0.25 MG/ML
1 INJECTION INTRAMUSCULAR; INTRAVENOUS DAILY
Refills: 0 | Status: DISCONTINUED | OUTPATIENT
Start: 2021-01-01 | End: 2021-01-01

## 2021-01-01 RX ORDER — HEPARIN SODIUM 5000 [USP'U]/ML
5000 INJECTION INTRAVENOUS; SUBCUTANEOUS EVERY 12 HOURS
Refills: 0 | Status: DISCONTINUED | OUTPATIENT
Start: 2021-01-01 | End: 2021-01-01

## 2021-01-01 RX ORDER — INSULIN LISPRO 100/ML
1 VIAL (ML) SUBCUTANEOUS ONCE
Refills: 0 | Status: COMPLETED | OUTPATIENT
Start: 2021-01-01 | End: 2021-01-01

## 2021-01-01 RX ORDER — ACETAMINOPHEN 500 MG
650 TABLET ORAL EVERY 6 HOURS
Refills: 0 | Status: DISCONTINUED | OUTPATIENT
Start: 2021-01-01 | End: 2021-01-01

## 2021-01-01 RX ORDER — OXYCODONE AND ACETAMINOPHEN 5; 325 MG/1; MG/1
1 TABLET ORAL ONCE
Refills: 0 | Status: DISCONTINUED | OUTPATIENT
Start: 2021-01-01 | End: 2021-01-01

## 2021-01-01 RX ORDER — METOPROLOL TARTRATE 50 MG
50 TABLET ORAL DAILY
Refills: 0 | Status: DISCONTINUED | OUTPATIENT
Start: 2021-01-01 | End: 2021-01-01

## 2021-01-01 RX ORDER — SODIUM BICARBONATE 1 MEQ/ML
0 SYRINGE (ML) INTRAVENOUS
Qty: 0 | Refills: 0 | DISCHARGE

## 2021-01-01 RX ORDER — METRONIDAZOLE 500 MG
500 TABLET ORAL ONCE
Refills: 0 | Status: COMPLETED | OUTPATIENT
Start: 2021-01-01 | End: 2021-01-01

## 2021-01-01 RX ORDER — OMEGA-3-ACID ETHYL ESTERS 1 G/1
1 CAPSULE, LIQUID FILLED ORAL
Refills: 0 | Status: ACTIVE | COMMUNITY

## 2021-01-01 RX ORDER — OMEGA-3 ACID ETHYL ESTERS 1 G
0 CAPSULE ORAL
Qty: 0 | Refills: 0 | DISCHARGE

## 2021-01-01 RX ORDER — HYDROMORPHONE HYDROCHLORIDE 2 MG/ML
0.5 INJECTION INTRAMUSCULAR; INTRAVENOUS; SUBCUTANEOUS
Refills: 0 | Status: DISCONTINUED | OUTPATIENT
Start: 2021-01-01 | End: 2021-01-01

## 2021-01-01 RX ORDER — BUMETANIDE 0.25 MG/ML
1 INJECTION INTRAMUSCULAR; INTRAVENOUS
Qty: 0 | Refills: 0 | DISCHARGE

## 2021-01-01 RX ORDER — ASPIRIN 81 MG/1
81 TABLET, DELAYED RELEASE ORAL
Refills: 0 | Status: ACTIVE | COMMUNITY

## 2021-01-01 RX ORDER — GLUCAGON INJECTION, SOLUTION 0.5 MG/.1ML
1 INJECTION, SOLUTION SUBCUTANEOUS ONCE
Refills: 0 | Status: DISCONTINUED | OUTPATIENT
Start: 2021-01-01 | End: 2021-01-01

## 2021-01-01 RX ORDER — ERYTHROPOIETIN 10000 [IU]/ML
20000 INJECTION, SOLUTION INTRAVENOUS; SUBCUTANEOUS
Refills: 0 | Status: DISCONTINUED | OUTPATIENT
Start: 2021-01-01 | End: 2021-01-01

## 2021-01-01 RX ADMIN — HEPARIN SODIUM 5000 UNIT(S): 5000 INJECTION INTRAVENOUS; SUBCUTANEOUS at 06:16

## 2021-01-01 RX ADMIN — Medication 3: at 18:26

## 2021-01-01 RX ADMIN — Medication 100 MILLIGRAM(S): at 22:11

## 2021-01-01 RX ADMIN — BACILLUS CALMETTE-GUERIN SUBSTRAIN CONNAUGHT LIVE ANTIGEN 50 MILLIGRAM(S): 81 INJECTION, POWDER, LYOPHILIZED, FOR SOLUTION INTRAVESICAL at 17:12

## 2021-01-01 RX ADMIN — ERYTHROPOIETIN 30000 UNIT(S): 10000 INJECTION, SOLUTION INTRAVENOUS; SUBCUTANEOUS at 12:04

## 2021-01-01 RX ADMIN — ERYTHROPOIETIN 30000 UNIT(S): 10000 INJECTION, SOLUTION INTRAVENOUS; SUBCUTANEOUS at 10:42

## 2021-01-01 RX ADMIN — Medication 3: at 16:31

## 2021-01-01 RX ADMIN — Medication 100 MILLIGRAM(S): at 14:44

## 2021-01-01 RX ADMIN — Medication 100 MILLIGRAM(S): at 18:58

## 2021-01-01 RX ADMIN — ERYTHROPOIETIN 30000 UNIT(S): 10000 INJECTION, SOLUTION INTRAVENOUS; SUBCUTANEOUS at 12:46

## 2021-01-01 RX ADMIN — Medication 250 MILLIGRAM(S): at 13:44

## 2021-01-01 RX ADMIN — ERYTHROPOIETIN 40000 UNIT(S): 10000 INJECTION, SOLUTION INTRAVENOUS; SUBCUTANEOUS at 11:40

## 2021-01-01 RX ADMIN — ERYTHROPOIETIN 20000 UNIT(S): 10000 INJECTION, SOLUTION INTRAVENOUS; SUBCUTANEOUS at 12:08

## 2021-01-01 RX ADMIN — BACILLUS CALMETTE-GUERIN SUBSTRAIN CONNAUGHT LIVE ANTIGEN 50 MILLIGRAM(S): 81 INJECTION, POWDER, LYOPHILIZED, FOR SOLUTION INTRAVESICAL at 15:25

## 2021-01-01 RX ADMIN — ERYTHROPOIETIN 40000 UNIT(S): 10000 INJECTION, SOLUTION INTRAVENOUS; SUBCUTANEOUS at 11:01

## 2021-01-01 RX ADMIN — CEFEPIME 100 MILLIGRAM(S): 1 INJECTION, POWDER, FOR SOLUTION INTRAMUSCULAR; INTRAVENOUS at 13:44

## 2021-01-01 RX ADMIN — Medication 100 MILLIGRAM(S): at 05:47

## 2021-01-01 RX ADMIN — FERUMOXYTOL 156 MILLIGRAM(S): 510 INJECTION INTRAVENOUS at 10:35

## 2021-01-01 RX ADMIN — Medication 50 MILLIGRAM(S): at 07:08

## 2021-01-01 RX ADMIN — IRON SUCROSE 100 MILLIGRAM(S): 20 INJECTION, SOLUTION INTRAVENOUS at 12:09

## 2021-01-01 RX ADMIN — BUMETANIDE 1 MILLIGRAM(S): 0.25 INJECTION INTRAMUSCULAR; INTRAVENOUS at 07:08

## 2021-01-01 RX ADMIN — ERYTHROPOIETIN 30000 UNIT(S): 10000 INJECTION, SOLUTION INTRAVENOUS; SUBCUTANEOUS at 11:10

## 2021-01-01 RX ADMIN — BUMETANIDE 1 MILLIGRAM(S): 0.25 INJECTION INTRAMUSCULAR; INTRAVENOUS at 05:48

## 2021-01-01 RX ADMIN — FERUMOXYTOL 156 MILLIGRAM(S): 510 INJECTION INTRAVENOUS at 11:34

## 2021-01-01 RX ADMIN — Medication 1 UNIT(S): at 22:42

## 2021-01-01 RX ADMIN — ERYTHROPOIETIN 30000 UNIT(S): 10000 INJECTION, SOLUTION INTRAVENOUS; SUBCUTANEOUS at 11:32

## 2021-01-01 RX ADMIN — Medication 50 MILLIGRAM(S): at 18:38

## 2021-01-01 RX ADMIN — Medication 50 MILLILITER(S): at 12:38

## 2021-01-01 RX ADMIN — HEPARIN SODIUM 5000 UNIT(S): 5000 INJECTION INTRAVENOUS; SUBCUTANEOUS at 17:15

## 2021-01-01 RX ADMIN — ERYTHROPOIETIN 40000 UNIT(S): 10000 INJECTION, SOLUTION INTRAVENOUS; SUBCUTANEOUS at 11:36

## 2021-01-01 RX ADMIN — CEFEPIME 100 MILLIGRAM(S): 1 INJECTION, POWDER, FOR SOLUTION INTRAMUSCULAR; INTRAVENOUS at 12:09

## 2021-01-01 RX ADMIN — ERYTHROPOIETIN 40000 UNIT(S): 10000 INJECTION, SOLUTION INTRAVENOUS; SUBCUTANEOUS at 11:33

## 2021-01-01 RX ADMIN — BUMETANIDE 1 MILLIGRAM(S): 0.25 INJECTION INTRAMUSCULAR; INTRAVENOUS at 18:37

## 2021-01-01 RX ADMIN — Medication 100 MILLIGRAM(S): at 21:33

## 2021-01-01 RX ADMIN — Medication 50 MILLIGRAM(S): at 05:48

## 2021-01-01 RX ADMIN — ERYTHROPOIETIN 40000 UNIT(S): 10000 INJECTION, SOLUTION INTRAVENOUS; SUBCUTANEOUS at 10:35

## 2021-01-01 RX ADMIN — HEPARIN SODIUM 5000 UNIT(S): 5000 INJECTION INTRAVENOUS; SUBCUTANEOUS at 05:48

## 2021-01-01 RX ADMIN — ERYTHROPOIETIN 40000 UNIT(S): 10000 INJECTION, SOLUTION INTRAVENOUS; SUBCUTANEOUS at 10:41

## 2021-01-01 RX ADMIN — ERYTHROPOIETIN 40000 UNIT(S): 10000 INJECTION, SOLUTION INTRAVENOUS; SUBCUTANEOUS at 11:35

## 2021-01-01 RX ADMIN — FERUMOXYTOL 510 MILLIGRAM(S): 510 INJECTION INTRAVENOUS at 11:05

## 2021-01-01 RX ADMIN — Medication 1: at 12:00

## 2021-01-01 RX ADMIN — PREGABALIN 1000 MICROGRAM(S): 225 CAPSULE ORAL at 14:40

## 2021-01-01 RX ADMIN — Medication 100 MILLIGRAM(S): at 06:21

## 2021-01-05 ENCOUNTER — RESULT REVIEW (OUTPATIENT)
Age: 71
End: 2021-01-05

## 2021-01-05 ENCOUNTER — OUTPATIENT (OUTPATIENT)
Dept: OUTPATIENT SERVICES | Facility: HOSPITAL | Age: 71
LOS: 1 days | Discharge: HOME | End: 2021-01-05
Payer: MEDICARE

## 2021-01-05 ENCOUNTER — NON-APPOINTMENT (OUTPATIENT)
Age: 71
End: 2021-01-05

## 2021-01-05 DIAGNOSIS — R39.9 UNSPECIFIED SYMPTOMS AND SIGNS INVOLVING THE GENITOURINARY SYSTEM: ICD-10-CM

## 2021-01-05 DIAGNOSIS — Z98.890 OTHER SPECIFIED POSTPROCEDURAL STATES: Chronic | ICD-10-CM

## 2021-01-05 DIAGNOSIS — R31.0 GROSS HEMATURIA: ICD-10-CM

## 2021-01-05 DIAGNOSIS — Z98.41 CATARACT EXTRACTION STATUS, RIGHT EYE: Chronic | ICD-10-CM

## 2021-01-05 PROCEDURE — 74176 CT ABD & PELVIS W/O CONTRAST: CPT | Mod: 26

## 2021-01-07 ENCOUNTER — APPOINTMENT (OUTPATIENT)
Dept: INFUSION THERAPY | Facility: CLINIC | Age: 71
End: 2021-01-07

## 2021-01-07 ENCOUNTER — LABORATORY RESULT (OUTPATIENT)
Age: 71
End: 2021-01-07

## 2021-01-07 LAB
HCT VFR BLD CALC: 26.7 %
HGB BLD-MCNC: 8.3 G/DL
MCHC RBC-ENTMCNC: 31.1 G/DL
MCHC RBC-ENTMCNC: 31.4 PG
MCV RBC AUTO: 101.1 FL
PLATELET # BLD AUTO: 140 K/UL
PMV BLD: 11.1 FL
RBC # BLD: 2.64 M/UL
RBC # FLD: 16 %
WBC # FLD AUTO: 7.82 K/UL

## 2021-01-07 RX ORDER — ERYTHROPOIETIN 10000 [IU]/ML
30000 INJECTION, SOLUTION INTRAVENOUS; SUBCUTANEOUS ONCE
Refills: 0 | Status: COMPLETED | OUTPATIENT
Start: 2021-01-07 | End: 2021-01-07

## 2021-01-07 RX ADMIN — ERYTHROPOIETIN 30000 UNIT(S): 10000 INJECTION, SOLUTION INTRAVENOUS; SUBCUTANEOUS at 11:31

## 2021-01-13 ENCOUNTER — APPOINTMENT (OUTPATIENT)
Dept: UROLOGY | Facility: CLINIC | Age: 71
End: 2021-01-13
Payer: MEDICARE

## 2021-01-13 VITALS
TEMPERATURE: 98.9 F | SYSTOLIC BLOOD PRESSURE: 156 MMHG | HEIGHT: 69 IN | DIASTOLIC BLOOD PRESSURE: 75 MMHG | HEART RATE: 67 BPM | WEIGHT: 233 LBS | BODY MASS INDEX: 34.51 KG/M2

## 2021-01-13 DIAGNOSIS — R31.0 GROSS HEMATURIA: ICD-10-CM

## 2021-01-13 PROCEDURE — 99214 OFFICE O/P EST MOD 30 MIN: CPT | Mod: 25

## 2021-01-13 PROCEDURE — 52000 CYSTOURETHROSCOPY: CPT

## 2021-01-13 NOTE — HISTORY OF PRESENT ILLNESS
[FreeTextEntry1] : 70-year-old with gross hematuria over the last year. He is on Coumadin. CT scan showed the possibility of a bladder tumor. He has significant renal insufficiency, A. fib, diabetes. Since last visit he has had a few episodes of hematuria, no dysuria, good urinary flow.  Cystoscopy today confirmed a 3 cm left lateral wall bladder tumor. See procedure note

## 2021-01-13 NOTE — REVIEW OF SYSTEMS
[Fever] : no fever [Nasal Discharge] : no nasal discharge [Chest Pain] : no chest pain [Cough] : no cough [Abdominal Pain] : no abdominal pain

## 2021-01-13 NOTE — ASSESSMENT
[FreeTextEntry1] : Intermittent gross hematuria secondary to anticoagulant and 3 cm bladder tumor left lateral wall. Discussed fully with the patient. Recommend cystoscopy with TURBT. Risk of bleeding, sepsis, perforation discussed. Will need catheter postop. Has significant comorbidities increasing risks including on anticoagulant for A. fib, diabetes, severe renal insufficiency. Will need cardiology and medical clearance.  will need to stop coumadin 4 days prior

## 2021-01-13 NOTE — PHYSICAL EXAM
[General Appearance - In No Acute Distress] : no acute distress [Edema] : no peripheral edema [Abdomen Soft] : soft [Abdomen Tenderness] : non-tender [Urethral Meatus] : meatus normal [Penis Abnormality] : normal circumcised penis [Scrotum] : the scrotum was normal [Normal Station and Gait] : the gait and station were normal for the patient's age [Oriented To Time, Place, And Person] : oriented to person, place, and time [Mood] : the mood was normal [Affect] : the affect was normal [Not Anxious] : not anxious

## 2021-01-14 ENCOUNTER — APPOINTMENT (OUTPATIENT)
Dept: INFUSION THERAPY | Facility: CLINIC | Age: 71
End: 2021-01-14

## 2021-01-14 ENCOUNTER — LABORATORY RESULT (OUTPATIENT)
Age: 71
End: 2021-01-14

## 2021-01-14 LAB
HCT VFR BLD CALC: 27.7 %
HGB BLD-MCNC: 8.9 G/DL
MCHC RBC-ENTMCNC: 31.9 PG
MCHC RBC-ENTMCNC: 32.1 G/DL
MCV RBC AUTO: 99.3 FL
PLATELET # BLD AUTO: 161 K/UL
PMV BLD: 10.5 FL
RBC # BLD: 2.79 M/UL
RBC # FLD: 15.9 %
WBC # FLD AUTO: 7.82 K/UL

## 2021-01-14 RX ORDER — ERYTHROPOIETIN 10000 [IU]/ML
30000 INJECTION, SOLUTION INTRAVENOUS; SUBCUTANEOUS ONCE
Refills: 0 | Status: COMPLETED | OUTPATIENT
Start: 2021-01-14 | End: 2021-01-14

## 2021-01-14 RX ADMIN — ERYTHROPOIETIN 30000 UNIT(S): 10000 INJECTION, SOLUTION INTRAVENOUS; SUBCUTANEOUS at 10:25

## 2021-01-20 ENCOUNTER — APPOINTMENT (OUTPATIENT)
Dept: UROLOGY | Facility: CLINIC | Age: 71
End: 2021-01-20

## 2021-01-21 ENCOUNTER — APPOINTMENT (OUTPATIENT)
Dept: INFUSION THERAPY | Facility: CLINIC | Age: 71
End: 2021-01-21

## 2021-01-21 ENCOUNTER — LABORATORY RESULT (OUTPATIENT)
Age: 71
End: 2021-01-21

## 2021-01-21 LAB
HCT VFR BLD CALC: 27 %
HGB BLD-MCNC: 8.6 G/DL
MCHC RBC-ENTMCNC: 31.3 PG
MCHC RBC-ENTMCNC: 31.9 G/DL
MCV RBC AUTO: 98.2 FL
PLATELET # BLD AUTO: 139 K/UL
PMV BLD: 11 FL
RBC # BLD: 2.75 M/UL
RBC # FLD: 15.6 %
WBC # FLD AUTO: 7.26 K/UL

## 2021-01-21 RX ORDER — ERYTHROPOIETIN 10000 [IU]/ML
30000 INJECTION, SOLUTION INTRAVENOUS; SUBCUTANEOUS ONCE
Refills: 0 | Status: COMPLETED | OUTPATIENT
Start: 2021-01-21 | End: 2021-01-21

## 2021-01-21 RX ADMIN — ERYTHROPOIETIN 30000 UNIT(S): 10000 INJECTION, SOLUTION INTRAVENOUS; SUBCUTANEOUS at 10:55

## 2021-01-26 ENCOUNTER — OUTPATIENT (OUTPATIENT)
Dept: OUTPATIENT SERVICES | Facility: HOSPITAL | Age: 71
LOS: 1 days | Discharge: HOME | End: 2021-01-26

## 2021-01-26 ENCOUNTER — LABORATORY RESULT (OUTPATIENT)
Age: 71
End: 2021-01-26

## 2021-01-26 DIAGNOSIS — Z98.41 CATARACT EXTRACTION STATUS, RIGHT EYE: Chronic | ICD-10-CM

## 2021-01-26 DIAGNOSIS — Z98.890 OTHER SPECIFIED POSTPROCEDURAL STATES: Chronic | ICD-10-CM

## 2021-01-26 DIAGNOSIS — Z11.59 ENCOUNTER FOR SCREENING FOR OTHER VIRAL DISEASES: ICD-10-CM

## 2021-01-28 ENCOUNTER — OUTPATIENT (OUTPATIENT)
Dept: OUTPATIENT SERVICES | Facility: HOSPITAL | Age: 71
LOS: 1 days | Discharge: HOME | End: 2021-01-28
Payer: MEDICARE

## 2021-01-28 ENCOUNTER — APPOINTMENT (OUTPATIENT)
Dept: INFUSION THERAPY | Facility: CLINIC | Age: 71
End: 2021-01-28

## 2021-01-28 ENCOUNTER — LABORATORY RESULT (OUTPATIENT)
Age: 71
End: 2021-01-28

## 2021-01-28 ENCOUNTER — NON-APPOINTMENT (OUTPATIENT)
Age: 71
End: 2021-01-28

## 2021-01-28 ENCOUNTER — RESULT REVIEW (OUTPATIENT)
Age: 71
End: 2021-01-28

## 2021-01-28 VITALS
HEART RATE: 70 BPM | OXYGEN SATURATION: 100 % | HEIGHT: 69 IN | TEMPERATURE: 96 F | SYSTOLIC BLOOD PRESSURE: 160 MMHG | DIASTOLIC BLOOD PRESSURE: 71 MMHG | RESPIRATION RATE: 16 BRPM | WEIGHT: 237 LBS

## 2021-01-28 DIAGNOSIS — C67.9 MALIGNANT NEOPLASM OF BLADDER, UNSPECIFIED: ICD-10-CM

## 2021-01-28 DIAGNOSIS — Z98.890 OTHER SPECIFIED POSTPROCEDURAL STATES: Chronic | ICD-10-CM

## 2021-01-28 DIAGNOSIS — Z01.818 ENCOUNTER FOR OTHER PREPROCEDURAL EXAMINATION: ICD-10-CM

## 2021-01-28 DIAGNOSIS — Z98.41 CATARACT EXTRACTION STATUS, RIGHT EYE: Chronic | ICD-10-CM

## 2021-01-28 LAB
A1C WITH ESTIMATED AVERAGE GLUCOSE RESULT: 6.7 % — HIGH (ref 4–5.6)
ALBUMIN SERPL ELPH-MCNC: 4 G/DL — SIGNIFICANT CHANGE UP (ref 3.5–5.2)
ALP SERPL-CCNC: 100 U/L — SIGNIFICANT CHANGE UP (ref 30–115)
ALT FLD-CCNC: 29 U/L — SIGNIFICANT CHANGE UP (ref 0–41)
ANION GAP SERPL CALC-SCNC: 13 MMOL/L — SIGNIFICANT CHANGE UP (ref 7–14)
APPEARANCE UR: CLEAR — SIGNIFICANT CHANGE UP
APTT BLD: 38.4 SEC — SIGNIFICANT CHANGE UP (ref 27–39.2)
AST SERPL-CCNC: 21 U/L — SIGNIFICANT CHANGE UP (ref 0–41)
BACTERIA # UR AUTO: NEGATIVE — SIGNIFICANT CHANGE UP
BASOPHILS # BLD AUTO: 0.04 K/UL — SIGNIFICANT CHANGE UP (ref 0–0.2)
BASOPHILS NFR BLD AUTO: 0.5 % — SIGNIFICANT CHANGE UP (ref 0–1)
BILIRUB SERPL-MCNC: 0.4 MG/DL — SIGNIFICANT CHANGE UP (ref 0.2–1.2)
BILIRUB UR-MCNC: NEGATIVE — SIGNIFICANT CHANGE UP
BUN SERPL-MCNC: 96 MG/DL — CRITICAL HIGH (ref 10–20)
CALCIUM SERPL-MCNC: 8.2 MG/DL — LOW (ref 8.5–10.1)
CHLORIDE SERPL-SCNC: 105 MMOL/L — SIGNIFICANT CHANGE UP (ref 98–110)
CO2 SERPL-SCNC: 21 MMOL/L — SIGNIFICANT CHANGE UP (ref 17–32)
COLOR SPEC: SIGNIFICANT CHANGE UP
CREAT SERPL-MCNC: 6.3 MG/DL — CRITICAL HIGH (ref 0.7–1.5)
DIFF PNL FLD: ABNORMAL
EOSINOPHIL # BLD AUTO: 0.19 K/UL — SIGNIFICANT CHANGE UP (ref 0–0.7)
EOSINOPHIL NFR BLD AUTO: 2.5 % — SIGNIFICANT CHANGE UP (ref 0–8)
EPI CELLS # UR: 3 /HPF — SIGNIFICANT CHANGE UP (ref 0–5)
ESTIMATED AVERAGE GLUCOSE: 146 MG/DL — HIGH (ref 68–114)
GLUCOSE SERPL-MCNC: 106 MG/DL — HIGH (ref 70–99)
GLUCOSE UR QL: SIGNIFICANT CHANGE UP
HCT VFR BLD CALC: 28.1 %
HCT VFR BLD CALC: 29.9 % — LOW (ref 42–52)
HGB BLD-MCNC: 9 G/DL
HGB BLD-MCNC: 9.1 G/DL — LOW (ref 14–18)
HYALINE CASTS # UR AUTO: 3 /LPF — SIGNIFICANT CHANGE UP (ref 0–7)
IMM GRANULOCYTES NFR BLD AUTO: 1.7 % — HIGH (ref 0.1–0.3)
INR BLD: 2.74 RATIO — HIGH (ref 0.65–1.3)
KETONES UR-MCNC: NEGATIVE — SIGNIFICANT CHANGE UP
LEUKOCYTE ESTERASE UR-ACNC: NEGATIVE — SIGNIFICANT CHANGE UP
LYMPHOCYTES # BLD AUTO: 0.89 K/UL — LOW (ref 1.2–3.4)
LYMPHOCYTES # BLD AUTO: 11.9 % — LOW (ref 20.5–51.1)
MCHC RBC-ENTMCNC: 30.4 G/DL — LOW (ref 32–37)
MCHC RBC-ENTMCNC: 30.4 PG — SIGNIFICANT CHANGE UP (ref 27–31)
MCHC RBC-ENTMCNC: 31.6 PG
MCHC RBC-ENTMCNC: 32 G/DL
MCV RBC AUTO: 100 FL — HIGH (ref 80–94)
MCV RBC AUTO: 98.6 FL
MONOCYTES # BLD AUTO: 1.03 K/UL — HIGH (ref 0.1–0.6)
MONOCYTES NFR BLD AUTO: 13.7 % — HIGH (ref 1.7–9.3)
NEUTROPHILS # BLD AUTO: 5.22 K/UL — SIGNIFICANT CHANGE UP (ref 1.4–6.5)
NEUTROPHILS NFR BLD AUTO: 69.7 % — SIGNIFICANT CHANGE UP (ref 42.2–75.2)
NITRITE UR-MCNC: NEGATIVE — SIGNIFICANT CHANGE UP
NRBC # BLD: 0 /100 WBCS — SIGNIFICANT CHANGE UP (ref 0–0)
PH UR: 6.5 — SIGNIFICANT CHANGE UP (ref 5–8)
PLATELET # BLD AUTO: 180 K/UL
PLATELET # BLD AUTO: 206 K/UL — SIGNIFICANT CHANGE UP (ref 130–400)
PMV BLD: 10.6 FL
POTASSIUM SERPL-MCNC: 4.5 MMOL/L — SIGNIFICANT CHANGE UP (ref 3.5–5)
POTASSIUM SERPL-SCNC: 4.5 MMOL/L — SIGNIFICANT CHANGE UP (ref 3.5–5)
PROT SERPL-MCNC: 6.8 G/DL — SIGNIFICANT CHANGE UP (ref 6–8)
PROT UR-MCNC: ABNORMAL
PROTHROM AB SERPL-ACNC: 31.5 SEC — HIGH (ref 9.95–12.87)
RBC # BLD: 2.85 M/UL
RBC # BLD: 2.99 M/UL — LOW (ref 4.7–6.1)
RBC # FLD: 15.3 %
RBC # FLD: 15.3 % — HIGH (ref 11.5–14.5)
RBC CASTS # UR COMP ASSIST: 76 /HPF — HIGH (ref 0–4)
SODIUM SERPL-SCNC: 139 MMOL/L — SIGNIFICANT CHANGE UP (ref 135–146)
SP GR SPEC: 1.01 — SIGNIFICANT CHANGE UP (ref 1.01–1.03)
UROBILINOGEN FLD QL: SIGNIFICANT CHANGE UP
WBC # BLD: 7.5 K/UL — SIGNIFICANT CHANGE UP (ref 4.8–10.8)
WBC # FLD AUTO: 7.5 K/UL — SIGNIFICANT CHANGE UP (ref 4.8–10.8)
WBC # FLD AUTO: 7.68 K/UL
WBC UR QL: 7 /HPF — HIGH (ref 0–5)

## 2021-01-28 PROCEDURE — 71046 X-RAY EXAM CHEST 2 VIEWS: CPT | Mod: 26

## 2021-01-28 PROCEDURE — 93010 ELECTROCARDIOGRAM REPORT: CPT

## 2021-01-28 RX ORDER — ERYTHROPOIETIN 10000 [IU]/ML
30000 INJECTION, SOLUTION INTRAVENOUS; SUBCUTANEOUS ONCE
Refills: 0 | Status: COMPLETED | OUTPATIENT
Start: 2021-01-28 | End: 2021-01-28

## 2021-01-28 RX ORDER — INSULIN GLARGINE 100 [IU]/ML
22 INJECTION, SOLUTION SUBCUTANEOUS
Qty: 0 | Refills: 0 | DISCHARGE

## 2021-01-28 RX ORDER — WARFARIN SODIUM 2.5 MG/1
1 TABLET ORAL
Qty: 0 | Refills: 0 | DISCHARGE

## 2021-01-28 RX ORDER — SODIUM BICARBONATE 1 MEQ/ML
0 SYRINGE (ML) INTRAVENOUS
Qty: 0 | Refills: 0 | DISCHARGE

## 2021-01-28 RX ORDER — AMLODIPINE BESYLATE 2.5 MG/1
0 TABLET ORAL
Qty: 0 | Refills: 0 | DISCHARGE

## 2021-01-28 RX ORDER — CHROMIUM PICOLINATE 200 MCG
0 TABLET ORAL
Qty: 0 | Refills: 0 | DISCHARGE

## 2021-01-28 RX ORDER — METOPROLOL TARTRATE 50 MG
0 TABLET ORAL
Qty: 0 | Refills: 0 | DISCHARGE

## 2021-01-28 RX ORDER — GLIMEPIRIDE 1 MG
0 TABLET ORAL
Qty: 0 | Refills: 0 | DISCHARGE

## 2021-01-28 RX ORDER — BUMETANIDE 0.25 MG/ML
0 INJECTION INTRAMUSCULAR; INTRAVENOUS
Qty: 0 | Refills: 0 | DISCHARGE

## 2021-01-28 RX ADMIN — ERYTHROPOIETIN 30000 UNIT(S): 10000 INJECTION, SOLUTION INTRAVENOUS; SUBCUTANEOUS at 12:05

## 2021-01-28 NOTE — H&P PST ADULT - NSICDXPASTMEDICALHX_GEN_ALL_CORE_FT
PAST MEDICAL HISTORY:  Afib     Anemia weekly procrit    Chronic gout     CKD (chronic kidney disease)     Diabetes     HTN (hypertension)     LUPE (obstructive sleep apnea)

## 2021-01-28 NOTE — H&P PST ADULT - HISTORY OF PRESENT ILLNESS
69 yo male presents w/ c/o 1 year on& off hematuria, "i was getting antibiotics for UTI, it would go away, dr rojas sent me for a CT scan& did a cystoscopy, there is a low grade tumor in my bladder, & i am getting it scraped out" pt presently asymptomatic; scheduled for cysto turbt  denies chest pain, palpitations, shortness of breath, dyspnea, or dysuria. exercise tolerance: 2 blocks/ flights of stairs w/o sob  pt denies any known exposure to COVID-19, denies any S&S   pt instructed re: self quarantine guidelines      Anesthesia Alert  NO--Difficult Airway  NO--History of neck surgery or radiation  NO--Limited ROM of neck  NO--History of Malignant hyperthermia  NO--No personal or family history of Pseudocholinesterase deficiency.  NO--Prior Anesthesia Complication  NO--Latex Allergy  NO--Loose teeth  NO--History of Rheumatoid Arthritis  YES--LUPE  NO--Other_____

## 2021-01-28 NOTE — H&P PST ADULT - NSICDXPASTSURGICALHX_GEN_ALL_CORE_FT
PAST SURGICAL HISTORY:  Cataract extraction status of eye, right     S/P rotator cuff repair left

## 2021-01-28 NOTE — H&P PST ADULT - ATTENDING COMMENTS
For TURBT, risks, bene;fits, alternatives discussed including bleeding, sepsis, catheter, perforation.  Sopped asa and coumadin for procedure, asa 1 week ago, coumadin 5 days ago.  chronic renal failure with highcreat.  pt understands possible worsening renal failure/dialysis in future.

## 2021-01-28 NOTE — H&P PST ADULT - OTHER CARE PROVIDERS
cardio: [facility] lv 2 mo ; [facility] lv last yr; coumadin center [facility]- appt 1/29, heme: [facility] lv 1 mo (anemia-weekly procrit)

## 2021-01-29 ENCOUNTER — OUTPATIENT (OUTPATIENT)
Dept: OUTPATIENT SERVICES | Facility: HOSPITAL | Age: 71
LOS: 1 days | Discharge: HOME | End: 2021-01-29

## 2021-01-29 ENCOUNTER — APPOINTMENT (OUTPATIENT)
Dept: MEDICATION MANAGEMENT | Facility: CLINIC | Age: 71
End: 2021-01-29

## 2021-01-29 VITALS — HEART RATE: 68 BPM | OXYGEN SATURATION: 98 % | RESPIRATION RATE: 16 BRPM

## 2021-01-29 DIAGNOSIS — Z98.890 OTHER SPECIFIED POSTPROCEDURAL STATES: Chronic | ICD-10-CM

## 2021-01-29 DIAGNOSIS — I48.91 UNSPECIFIED ATRIAL FIBRILLATION: ICD-10-CM

## 2021-01-29 DIAGNOSIS — Z79.01 LONG TERM (CURRENT) USE OF ANTICOAGULANTS: ICD-10-CM

## 2021-01-29 DIAGNOSIS — Z98.41 CATARACT EXTRACTION STATUS, RIGHT EYE: Chronic | ICD-10-CM

## 2021-01-29 LAB
CULTURE RESULTS: NO GROWTH — SIGNIFICANT CHANGE UP
INR PPP: 2.8 RATIO
POCT-PROTHROMBIN TIME: 33.5 SECS
QUALITY CONTROL: YES
SPECIMEN SOURCE: SIGNIFICANT CHANGE UP

## 2021-02-02 DIAGNOSIS — Z02.9 ENCOUNTER FOR ADMINISTRATIVE EXAMINATIONS, UNSPECIFIED: ICD-10-CM

## 2021-02-04 ENCOUNTER — LABORATORY RESULT (OUTPATIENT)
Age: 71
End: 2021-02-04

## 2021-02-04 ENCOUNTER — APPOINTMENT (OUTPATIENT)
Dept: INFUSION THERAPY | Facility: CLINIC | Age: 71
End: 2021-02-04

## 2021-02-04 LAB
HCT VFR BLD CALC: 28.4 %
HGB BLD-MCNC: 9 G/DL
MCHC RBC-ENTMCNC: 31 PG
MCHC RBC-ENTMCNC: 31.7 G/DL
MCV RBC AUTO: 97.9 FL
PLATELET # BLD AUTO: 162 K/UL
PMV BLD: 10.8 FL
RBC # BLD: 2.9 M/UL
RBC # FLD: 15.5 %
WBC # FLD AUTO: 6.85 K/UL

## 2021-02-04 RX ORDER — ERYTHROPOIETIN 10000 [IU]/ML
30000 INJECTION, SOLUTION INTRAVENOUS; SUBCUTANEOUS ONCE
Refills: 0 | Status: COMPLETED | OUTPATIENT
Start: 2021-02-04 | End: 2021-02-04

## 2021-02-04 RX ADMIN — ERYTHROPOIETIN 30000 UNIT(S): 10000 INJECTION, SOLUTION INTRAVENOUS; SUBCUTANEOUS at 10:19

## 2021-02-11 ENCOUNTER — APPOINTMENT (OUTPATIENT)
Dept: INFUSION THERAPY | Facility: CLINIC | Age: 71
End: 2021-02-11

## 2021-02-11 ENCOUNTER — LABORATORY RESULT (OUTPATIENT)
Age: 71
End: 2021-02-11

## 2021-02-11 LAB
HCT VFR BLD CALC: 28.7 %
HGB BLD-MCNC: 9.1 G/DL
MCHC RBC-ENTMCNC: 31.2 PG
MCHC RBC-ENTMCNC: 31.7 G/DL
MCV RBC AUTO: 98.3 FL
PLATELET # BLD AUTO: 147 K/UL
PMV BLD: 10.7 FL
RBC # BLD: 2.92 M/UL
RBC # FLD: 15.9 %
WBC # FLD AUTO: 7.12 K/UL

## 2021-02-11 RX ORDER — ERYTHROPOIETIN 10000 [IU]/ML
30000 INJECTION, SOLUTION INTRAVENOUS; SUBCUTANEOUS ONCE
Refills: 0 | Status: COMPLETED | OUTPATIENT
Start: 2021-02-11 | End: 2021-02-11

## 2021-02-11 RX ADMIN — ERYTHROPOIETIN 30000 UNIT(S): 10000 INJECTION, SOLUTION INTRAVENOUS; SUBCUTANEOUS at 10:53

## 2021-02-15 ENCOUNTER — LABORATORY RESULT (OUTPATIENT)
Age: 71
End: 2021-02-15

## 2021-02-15 ENCOUNTER — OUTPATIENT (OUTPATIENT)
Dept: OUTPATIENT SERVICES | Facility: HOSPITAL | Age: 71
LOS: 1 days | Discharge: HOME | End: 2021-02-15

## 2021-02-15 DIAGNOSIS — Z98.41 CATARACT EXTRACTION STATUS, RIGHT EYE: Chronic | ICD-10-CM

## 2021-02-15 DIAGNOSIS — Z98.890 OTHER SPECIFIED POSTPROCEDURAL STATES: Chronic | ICD-10-CM

## 2021-02-15 DIAGNOSIS — Z11.59 ENCOUNTER FOR SCREENING FOR OTHER VIRAL DISEASES: ICD-10-CM

## 2021-02-17 ENCOUNTER — LABORATORY RESULT (OUTPATIENT)
Age: 71
End: 2021-02-17

## 2021-02-17 ENCOUNTER — APPOINTMENT (OUTPATIENT)
Dept: INFUSION THERAPY | Facility: CLINIC | Age: 71
End: 2021-02-17

## 2021-02-17 LAB
HCT VFR BLD CALC: 30.8 %
HGB BLD-MCNC: 9.6 G/DL
MCHC RBC-ENTMCNC: 31.2 G/DL
MCHC RBC-ENTMCNC: 31.4 PG
MCV RBC AUTO: 100.7 FL
PLATELET # BLD AUTO: 149 K/UL
PMV BLD: 10.3 FL
RBC # BLD: 3.06 M/UL
RBC # FLD: 16.4 %
WBC # FLD AUTO: 7.82 K/UL

## 2021-02-17 RX ORDER — ERYTHROPOIETIN 10000 [IU]/ML
30000 INJECTION, SOLUTION INTRAVENOUS; SUBCUTANEOUS ONCE
Refills: 0 | Status: COMPLETED | OUTPATIENT
Start: 2021-02-17 | End: 2021-02-17

## 2021-02-17 RX ADMIN — ERYTHROPOIETIN 30000 UNIT(S): 10000 INJECTION, SOLUTION INTRAVENOUS; SUBCUTANEOUS at 11:40

## 2021-02-18 ENCOUNTER — RESULT REVIEW (OUTPATIENT)
Age: 71
End: 2021-02-18

## 2021-02-18 ENCOUNTER — APPOINTMENT (OUTPATIENT)
Dept: UROLOGY | Facility: HOSPITAL | Age: 71
End: 2021-02-18

## 2021-02-18 ENCOUNTER — OUTPATIENT (OUTPATIENT)
Dept: OUTPATIENT SERVICES | Facility: HOSPITAL | Age: 71
LOS: 1 days | Discharge: HOME | End: 2021-02-18
Payer: MEDICARE

## 2021-02-18 VITALS
TEMPERATURE: 97 F | HEART RATE: 82 BPM | RESPIRATION RATE: 19 BRPM | OXYGEN SATURATION: 99 % | DIASTOLIC BLOOD PRESSURE: 89 MMHG | WEIGHT: 240.08 LBS | SYSTOLIC BLOOD PRESSURE: 185 MMHG | HEIGHT: 69 IN

## 2021-02-18 VITALS — DIASTOLIC BLOOD PRESSURE: 62 MMHG | RESPIRATION RATE: 15 BRPM | HEART RATE: 67 BPM | SYSTOLIC BLOOD PRESSURE: 131 MMHG

## 2021-02-18 DIAGNOSIS — Z98.41 CATARACT EXTRACTION STATUS, RIGHT EYE: Chronic | ICD-10-CM

## 2021-02-18 DIAGNOSIS — Z98.890 OTHER SPECIFIED POSTPROCEDURAL STATES: Chronic | ICD-10-CM

## 2021-02-18 LAB — GLUCOSE BLDC GLUCOMTR-MCNC: 90 MG/DL — SIGNIFICANT CHANGE UP (ref 70–99)

## 2021-02-18 PROCEDURE — 88313 SPECIAL STAINS GROUP 2: CPT | Mod: 26

## 2021-02-18 PROCEDURE — 52240 CYSTOSCOPY AND TREATMENT: CPT

## 2021-02-18 PROCEDURE — 88307 TISSUE EXAM BY PATHOLOGIST: CPT | Mod: 26

## 2021-02-18 PROCEDURE — 88342 IMHCHEM/IMCYTCHM 1ST ANTB: CPT | Mod: 26

## 2021-02-18 RX ORDER — PHENAZOPYRIDINE HCL 100 MG
100 TABLET ORAL ONCE
Refills: 0 | Status: COMPLETED | OUTPATIENT
Start: 2021-02-18 | End: 2021-02-18

## 2021-02-18 RX ORDER — HYDROMORPHONE HYDROCHLORIDE 2 MG/ML
0.5 INJECTION INTRAMUSCULAR; INTRAVENOUS; SUBCUTANEOUS
Refills: 0 | Status: DISCONTINUED | OUTPATIENT
Start: 2021-02-18 | End: 2021-02-18

## 2021-02-18 RX ORDER — CEPHALEXIN 500 MG
1 CAPSULE ORAL
Qty: 12 | Refills: 0
Start: 2021-02-18 | End: 2021-02-20

## 2021-02-18 RX ORDER — PHENAZOPYRIDINE HCL 100 MG
1 TABLET ORAL
Qty: 21 | Refills: 0
Start: 2021-02-18 | End: 2021-02-24

## 2021-02-18 RX ORDER — ONDANSETRON 8 MG/1
4 TABLET, FILM COATED ORAL ONCE
Refills: 0 | Status: DISCONTINUED | OUTPATIENT
Start: 2021-02-18 | End: 2021-02-18

## 2021-02-18 RX ORDER — SODIUM CHLORIDE 9 MG/ML
1000 INJECTION, SOLUTION INTRAVENOUS
Refills: 0 | Status: DISCONTINUED | OUTPATIENT
Start: 2021-02-18 | End: 2021-02-18

## 2021-02-18 RX ADMIN — Medication 100 MILLIGRAM(S): at 09:43

## 2021-02-18 RX ADMIN — SODIUM CHLORIDE 75 MILLILITER(S): 9 INJECTION, SOLUTION INTRAVENOUS at 09:43

## 2021-02-18 NOTE — ASU DISCHARGE PLAN (ADULT/PEDIATRIC) - CARE PROVIDER_API CALL
Neto Montero)  Urology  [address] [place] [address], [address]  [place] [address]  Phone: [phone]  Fax: [phone]  Follow Up Time: 1-3 days

## 2021-02-18 NOTE — BRIEF OPERATIVE NOTE - NSICDXBRIEFPROCEDURE_GEN_ALL_CORE_FT
PROCEDURES:  Cystoscopy with fulguration and resection of bladder tumor 18-Feb-2021 08:53:15  Neto Montero

## 2021-02-18 NOTE — CHART NOTE - NSCHARTNOTEFT_GEN_A_CORE
Pt s/p cystoscopy TURBT with General Anesthesia   Uneventful course   Extubated in OR awake  To PACU on FM O2 stable good respiratory effort

## 2021-02-18 NOTE — ASU PATIENT PROFILE, ADULT - PMH
Afib    Anemia  weekly procrit  Chronic gout    CKD (chronic kidney disease)    Diabetes    HTN (hypertension)    LUPE (obstructive sleep apnea)

## 2021-02-22 ENCOUNTER — APPOINTMENT (OUTPATIENT)
Dept: UROLOGY | Facility: CLINIC | Age: 71
End: 2021-02-22
Payer: MEDICARE

## 2021-02-22 PROCEDURE — 99213 OFFICE O/P EST LOW 20 MIN: CPT

## 2021-02-22 NOTE — ASSESSMENT
[FreeTextEntry1] : Stable post op.  Stone removed.  Ok to start anticoagulant.  He understands risk of bleeding, recatheterization.  Pathology pending

## 2021-02-22 NOTE — REVIEW OF SYSTEMS
[Feeling Poorly] : not feeling poorly [Feeling Tired] : not feeling tired [Chest Pain] : no chest pain [Cough] : no cough [Constipation] : no constipation [Dysuria] : no dysuria [Skin Lesions] : no skin lesions [Confused] : no confusion

## 2021-02-22 NOTE — PHYSICAL EXAM
[General Appearance - In No Acute Distress] : no acute distress [Urethral Meatus] : meatus normal [Scrotum] : the scrotum was normal [Edema] : no peripheral edema [] : no respiratory distress [Oriented To Time, Place, And Person] : oriented to person, place, and time [Normal Station and Gait] : the gait and station were normal for the patient's age

## 2021-02-22 NOTE — HISTORY OF PRESENT ILLNESS
[Hematuria - Gross] : no gross hematuria [FreeTextEntry1] : 4 days post turbt/ urine remains clear via wiggins [Flank Pain] : no flank pain [Fever] : no fever

## 2021-02-25 ENCOUNTER — APPOINTMENT (OUTPATIENT)
Dept: INFUSION THERAPY | Facility: CLINIC | Age: 71
End: 2021-02-25

## 2021-02-25 ENCOUNTER — LABORATORY RESULT (OUTPATIENT)
Age: 71
End: 2021-02-25

## 2021-02-25 ENCOUNTER — OUTPATIENT (OUTPATIENT)
Dept: OUTPATIENT SERVICES | Facility: HOSPITAL | Age: 71
LOS: 1 days | Discharge: HOME | End: 2021-02-25

## 2021-02-25 ENCOUNTER — APPOINTMENT (OUTPATIENT)
Dept: MEDICATION MANAGEMENT | Facility: CLINIC | Age: 71
End: 2021-02-25

## 2021-02-25 VITALS — RESPIRATION RATE: 16 BRPM | HEART RATE: 72 BPM | OXYGEN SATURATION: 97 %

## 2021-02-25 DIAGNOSIS — I48.91 UNSPECIFIED ATRIAL FIBRILLATION: ICD-10-CM

## 2021-02-25 DIAGNOSIS — Z79.01 LONG TERM (CURRENT) USE OF ANTICOAGULANTS: ICD-10-CM

## 2021-02-25 DIAGNOSIS — Z98.41 CATARACT EXTRACTION STATUS, RIGHT EYE: Chronic | ICD-10-CM

## 2021-02-25 DIAGNOSIS — Z98.890 OTHER SPECIFIED POSTPROCEDURAL STATES: Chronic | ICD-10-CM

## 2021-02-25 LAB
HCT VFR BLD CALC: 27.1 %
HGB BLD-MCNC: 8.5 G/DL
INR PPP: 1.5 RATIO
MCHC RBC-ENTMCNC: 31.1 PG
MCHC RBC-ENTMCNC: 31.4 G/DL
MCV RBC AUTO: 99.3 FL
PLATELET # BLD AUTO: 146 K/UL
PMV BLD: 11 FL
POCT-PROTHROMBIN TIME: 17.8 SECS
QUALITY CONTROL: YES
RBC # BLD: 2.73 M/UL
RBC # FLD: 16.1 %
SURGICAL PATHOLOGY STUDY: SIGNIFICANT CHANGE UP
WBC # FLD AUTO: 8.89 K/UL

## 2021-02-25 RX ORDER — ERYTHROPOIETIN 10000 [IU]/ML
30000 INJECTION, SOLUTION INTRAVENOUS; SUBCUTANEOUS ONCE
Refills: 0 | Status: COMPLETED | OUTPATIENT
Start: 2021-02-25 | End: 2021-02-25

## 2021-02-25 RX ADMIN — ERYTHROPOIETIN 30000 UNIT(S): 10000 INJECTION, SOLUTION INTRAVENOUS; SUBCUTANEOUS at 11:29

## 2021-02-26 DIAGNOSIS — Z87.891 PERSONAL HISTORY OF NICOTINE DEPENDENCE: ICD-10-CM

## 2021-02-26 DIAGNOSIS — I50.9 HEART FAILURE, UNSPECIFIED: ICD-10-CM

## 2021-02-26 DIAGNOSIS — G47.33 OBSTRUCTIVE SLEEP APNEA (ADULT) (PEDIATRIC): ICD-10-CM

## 2021-02-26 DIAGNOSIS — D64.9 ANEMIA, UNSPECIFIED: ICD-10-CM

## 2021-02-26 DIAGNOSIS — Z79.4 LONG TERM (CURRENT) USE OF INSULIN: ICD-10-CM

## 2021-02-26 DIAGNOSIS — Z79.01 LONG TERM (CURRENT) USE OF ANTICOAGULANTS: ICD-10-CM

## 2021-02-26 DIAGNOSIS — M10.9 GOUT, UNSPECIFIED: ICD-10-CM

## 2021-02-26 DIAGNOSIS — C67.3 MALIGNANT NEOPLASM OF ANTERIOR WALL OF BLADDER: ICD-10-CM

## 2021-02-26 DIAGNOSIS — E11.22 TYPE 2 DIABETES MELLITUS WITH DIABETIC CHRONIC KIDNEY DISEASE: ICD-10-CM

## 2021-02-26 DIAGNOSIS — I12.9 HYPERTENSIVE CHRONIC KIDNEY DISEASE WITH STAGE 1 THROUGH STAGE 4 CHRONIC KIDNEY DISEASE, OR UNSPECIFIED CHRONIC KIDNEY DISEASE: ICD-10-CM

## 2021-02-26 DIAGNOSIS — I48.91 UNSPECIFIED ATRIAL FIBRILLATION: ICD-10-CM

## 2021-02-26 DIAGNOSIS — D49.4 NEOPLASM OF UNSPECIFIED BEHAVIOR OF BLADDER: ICD-10-CM

## 2021-03-04 ENCOUNTER — LABORATORY RESULT (OUTPATIENT)
Age: 71
End: 2021-03-04

## 2021-03-04 ENCOUNTER — APPOINTMENT (OUTPATIENT)
Dept: INFUSION THERAPY | Facility: CLINIC | Age: 71
End: 2021-03-04

## 2021-03-04 LAB
HCT VFR BLD CALC: 28.6 %
HGB BLD-MCNC: 8.9 G/DL
MCHC RBC-ENTMCNC: 30.8 PG
MCHC RBC-ENTMCNC: 31.1 G/DL
MCV RBC AUTO: 99 FL
PLATELET # BLD AUTO: 162 K/UL
PMV BLD: 10.8 FL
RBC # BLD: 2.89 M/UL
RBC # FLD: 16.3 %
WBC # FLD AUTO: 7.76 K/UL

## 2021-03-04 RX ORDER — ERYTHROPOIETIN 10000 [IU]/ML
30000 INJECTION, SOLUTION INTRAVENOUS; SUBCUTANEOUS ONCE
Refills: 0 | Status: COMPLETED | OUTPATIENT
Start: 2021-03-04 | End: 2021-03-04

## 2021-03-04 RX ORDER — POTASSIUM CHLORIDE 20 MEQ
20 PACKET (EA) ORAL ONCE
Refills: 0 | Status: DISCONTINUED | OUTPATIENT
Start: 2021-03-04 | End: 2021-03-04

## 2021-03-04 RX ADMIN — ERYTHROPOIETIN 30000 UNIT(S): 10000 INJECTION, SOLUTION INTRAVENOUS; SUBCUTANEOUS at 10:52

## 2021-03-05 ENCOUNTER — APPOINTMENT (OUTPATIENT)
Dept: UROLOGY | Facility: CLINIC | Age: 71
End: 2021-03-05
Payer: MEDICARE

## 2021-03-05 VITALS — WEIGHT: 233 LBS | TEMPERATURE: 97.3 F | BODY MASS INDEX: 34.51 KG/M2 | HEIGHT: 69 IN

## 2021-03-05 PROCEDURE — 99214 OFFICE O/P EST MOD 30 MIN: CPT

## 2021-03-05 NOTE — REVIEW OF SYSTEMS
[Feeling Poorly] : not feeling poorly [Nasal Discharge] : no nasal discharge [Chest Pain] : no chest pain [Cough] : no cough [Constipation] : no constipation [Dysuria] : no dysuria

## 2021-03-05 NOTE — HISTORY OF PRESENT ILLNESS
[FreeTextEntry1] : Status post TURBT of large bladder tumor. Patient is voiding well without hematuria and is back on anticoagulants.\par \par I discussed the pathology with them which shows high-grade bladder cancer which is at least T1 although we could not identify muscle in the specimen. There was areas of squamous differentiation

## 2021-03-05 NOTE — ASSESSMENT
[FreeTextEntry1] : High-grade T1 bladder cancer. Recommend second TURBT in 6 weeks. Patient has significant comorbidities including anticoagulants, renal failure. His risk for cardiopulmonary events and bleeding. Treatment will be determined by final pathology results and I discussed the possibility of chemotherapy/radiation versus intravesical therapy.Risks of bleeding, infection, perforation discussed. Will need postop catheter and routine preadmission testing

## 2021-03-11 ENCOUNTER — LABORATORY RESULT (OUTPATIENT)
Age: 71
End: 2021-03-11

## 2021-03-11 ENCOUNTER — APPOINTMENT (OUTPATIENT)
Dept: INFUSION THERAPY | Facility: CLINIC | Age: 71
End: 2021-03-11

## 2021-03-11 LAB
HCT VFR BLD CALC: 28.7 %
HGB BLD-MCNC: 9 G/DL
MCHC RBC-ENTMCNC: 30.9 PG
MCHC RBC-ENTMCNC: 31.4 G/DL
MCV RBC AUTO: 98.6 FL
PLATELET # BLD AUTO: 165 K/UL
PMV BLD: 11 FL
RBC # BLD: 2.91 M/UL
RBC # FLD: 17 %
WBC # FLD AUTO: 8.76 K/UL

## 2021-03-11 RX ORDER — ERYTHROPOIETIN 10000 [IU]/ML
30000 INJECTION, SOLUTION INTRAVENOUS; SUBCUTANEOUS ONCE
Refills: 0 | Status: COMPLETED | OUTPATIENT
Start: 2021-03-11 | End: 2021-03-11

## 2021-03-11 RX ADMIN — ERYTHROPOIETIN 30000 UNIT(S): 10000 INJECTION, SOLUTION INTRAVENOUS; SUBCUTANEOUS at 10:55

## 2021-03-12 ENCOUNTER — LABORATORY RESULT (OUTPATIENT)
Age: 71
End: 2021-03-12

## 2021-03-12 ENCOUNTER — OUTPATIENT (OUTPATIENT)
Dept: OUTPATIENT SERVICES | Facility: HOSPITAL | Age: 71
LOS: 1 days | Discharge: HOME | End: 2021-03-12

## 2021-03-12 DIAGNOSIS — Z98.41 CATARACT EXTRACTION STATUS, RIGHT EYE: Chronic | ICD-10-CM

## 2021-03-12 DIAGNOSIS — Z11.59 ENCOUNTER FOR SCREENING FOR OTHER VIRAL DISEASES: ICD-10-CM

## 2021-03-12 DIAGNOSIS — Z98.890 OTHER SPECIFIED POSTPROCEDURAL STATES: Chronic | ICD-10-CM

## 2021-03-18 ENCOUNTER — APPOINTMENT (OUTPATIENT)
Dept: MEDICATION MANAGEMENT | Facility: CLINIC | Age: 71
End: 2021-03-18

## 2021-03-18 ENCOUNTER — LABORATORY RESULT (OUTPATIENT)
Age: 71
End: 2021-03-18

## 2021-03-18 ENCOUNTER — APPOINTMENT (OUTPATIENT)
Dept: INFUSION THERAPY | Facility: CLINIC | Age: 71
End: 2021-03-18

## 2021-03-18 ENCOUNTER — OUTPATIENT (OUTPATIENT)
Dept: OUTPATIENT SERVICES | Facility: HOSPITAL | Age: 71
LOS: 1 days | Discharge: HOME | End: 2021-03-18

## 2021-03-18 VITALS — OXYGEN SATURATION: 98 % | HEART RATE: 80 BPM | RESPIRATION RATE: 16 BRPM

## 2021-03-18 DIAGNOSIS — Z98.890 OTHER SPECIFIED POSTPROCEDURAL STATES: Chronic | ICD-10-CM

## 2021-03-18 DIAGNOSIS — Z98.41 CATARACT EXTRACTION STATUS, RIGHT EYE: Chronic | ICD-10-CM

## 2021-03-18 DIAGNOSIS — I48.91 UNSPECIFIED ATRIAL FIBRILLATION: ICD-10-CM

## 2021-03-18 DIAGNOSIS — Z79.01 LONG TERM (CURRENT) USE OF ANTICOAGULANTS: ICD-10-CM

## 2021-03-18 LAB
HCT VFR BLD CALC: 27.1 %
HGB BLD-MCNC: 8.6 G/DL
INR PPP: 4.3 RATIO
MCHC RBC-ENTMCNC: 31.4 PG
MCHC RBC-ENTMCNC: 31.7 G/DL
MCV RBC AUTO: 98.9 FL
PLATELET # BLD AUTO: 132 K/UL
PMV BLD: 11.1 FL
POCT-PROTHROMBIN TIME: 53.8 SECS
QUALITY CONTROL: YES
RBC # BLD: 2.74 M/UL
RBC # FLD: 17.2 %
WBC # FLD AUTO: 8.23 K/UL

## 2021-03-18 RX ORDER — ERYTHROPOIETIN 10000 [IU]/ML
30000 INJECTION, SOLUTION INTRAVENOUS; SUBCUTANEOUS ONCE
Refills: 0 | Status: COMPLETED | OUTPATIENT
Start: 2021-03-18 | End: 2021-03-18

## 2021-03-18 RX ADMIN — ERYTHROPOIETIN 30000 UNIT(S): 10000 INJECTION, SOLUTION INTRAVENOUS; SUBCUTANEOUS at 11:07

## 2021-03-25 ENCOUNTER — APPOINTMENT (OUTPATIENT)
Dept: INFUSION THERAPY | Facility: CLINIC | Age: 71
End: 2021-03-25

## 2021-03-25 ENCOUNTER — APPOINTMENT (OUTPATIENT)
Dept: MEDICATION MANAGEMENT | Facility: CLINIC | Age: 71
End: 2021-03-25

## 2021-03-25 ENCOUNTER — LABORATORY RESULT (OUTPATIENT)
Age: 71
End: 2021-03-25

## 2021-03-25 ENCOUNTER — OUTPATIENT (OUTPATIENT)
Dept: OUTPATIENT SERVICES | Facility: HOSPITAL | Age: 71
LOS: 1 days | Discharge: HOME | End: 2021-03-25

## 2021-03-25 ENCOUNTER — OUTPATIENT (OUTPATIENT)
Dept: OUTPATIENT SERVICES | Facility: HOSPITAL | Age: 71
LOS: 1 days | Discharge: HOME | End: 2021-03-25
Payer: MEDICARE

## 2021-03-25 ENCOUNTER — NON-APPOINTMENT (OUTPATIENT)
Age: 71
End: 2021-03-25

## 2021-03-25 VITALS
TEMPERATURE: 96 F | DIASTOLIC BLOOD PRESSURE: 80 MMHG | WEIGHT: 242.95 LBS | RESPIRATION RATE: 16 BRPM | HEIGHT: 69 IN | SYSTOLIC BLOOD PRESSURE: 181 MMHG | HEART RATE: 85 BPM | OXYGEN SATURATION: 97 %

## 2021-03-25 DIAGNOSIS — Z98.890 OTHER SPECIFIED POSTPROCEDURAL STATES: Chronic | ICD-10-CM

## 2021-03-25 DIAGNOSIS — C67.9 MALIGNANT NEOPLASM OF BLADDER, UNSPECIFIED: ICD-10-CM

## 2021-03-25 DIAGNOSIS — Z01.818 ENCOUNTER FOR OTHER PREPROCEDURAL EXAMINATION: ICD-10-CM

## 2021-03-25 DIAGNOSIS — Z79.01 LONG TERM (CURRENT) USE OF ANTICOAGULANTS: ICD-10-CM

## 2021-03-25 DIAGNOSIS — Z98.41 CATARACT EXTRACTION STATUS, RIGHT EYE: Chronic | ICD-10-CM

## 2021-03-25 DIAGNOSIS — I48.91 UNSPECIFIED ATRIAL FIBRILLATION: ICD-10-CM

## 2021-03-25 LAB
A1C WITH ESTIMATED AVERAGE GLUCOSE RESULT: 6.9 % — HIGH (ref 4–5.6)
ALBUMIN SERPL ELPH-MCNC: 4.2 G/DL — SIGNIFICANT CHANGE UP (ref 3.5–5.2)
ALP SERPL-CCNC: 107 U/L — SIGNIFICANT CHANGE UP (ref 30–115)
ALT FLD-CCNC: 25 U/L — SIGNIFICANT CHANGE UP (ref 0–41)
ANION GAP SERPL CALC-SCNC: 16 MMOL/L — HIGH (ref 7–14)
APPEARANCE UR: CLEAR — SIGNIFICANT CHANGE UP
APTT BLD: 48 SEC — HIGH (ref 27–39.2)
AST SERPL-CCNC: 19 U/L — SIGNIFICANT CHANGE UP (ref 0–41)
BACTERIA # UR AUTO: NEGATIVE — SIGNIFICANT CHANGE UP
BASOPHILS # BLD AUTO: 0.05 K/UL — SIGNIFICANT CHANGE UP (ref 0–0.2)
BASOPHILS NFR BLD AUTO: 0.7 % — SIGNIFICANT CHANGE UP (ref 0–1)
BILIRUB SERPL-MCNC: 0.6 MG/DL — SIGNIFICANT CHANGE UP (ref 0.2–1.2)
BILIRUB UR-MCNC: NEGATIVE — SIGNIFICANT CHANGE UP
BUN SERPL-MCNC: 89 MG/DL — CRITICAL HIGH (ref 10–20)
CALCIUM SERPL-MCNC: 8.7 MG/DL — SIGNIFICANT CHANGE UP (ref 8.5–10.1)
CHLORIDE SERPL-SCNC: 107 MMOL/L — SIGNIFICANT CHANGE UP (ref 98–110)
CO2 SERPL-SCNC: 19 MMOL/L — SIGNIFICANT CHANGE UP (ref 17–32)
COLOR SPEC: SIGNIFICANT CHANGE UP
CREAT SERPL-MCNC: 5.3 MG/DL — CRITICAL HIGH (ref 0.7–1.5)
DIFF PNL FLD: SIGNIFICANT CHANGE UP
EOSINOPHIL # BLD AUTO: 0.25 K/UL — SIGNIFICANT CHANGE UP (ref 0–0.7)
EOSINOPHIL NFR BLD AUTO: 3.4 % — SIGNIFICANT CHANGE UP (ref 0–8)
EPI CELLS # UR: 1 /HPF — SIGNIFICANT CHANGE UP (ref 0–5)
ESTIMATED AVERAGE GLUCOSE: 151 MG/DL — HIGH (ref 68–114)
GLUCOSE SERPL-MCNC: 70 MG/DL — SIGNIFICANT CHANGE UP (ref 70–99)
GLUCOSE UR QL: NEGATIVE — SIGNIFICANT CHANGE UP
HCT VFR BLD CALC: 27.6 %
HCT VFR BLD CALC: 29.1 % — LOW (ref 42–52)
HGB BLD-MCNC: 8.6 G/DL
HGB BLD-MCNC: 8.9 G/DL — LOW (ref 14–18)
HYALINE CASTS # UR AUTO: 4 /LPF — SIGNIFICANT CHANGE UP (ref 0–7)
IMM GRANULOCYTES NFR BLD AUTO: 8.3 % — HIGH (ref 0.1–0.3)
INR BLD: 3.67 RATIO — HIGH (ref 0.65–1.3)
INR PPP: 3.8 RATIO
KETONES UR-MCNC: NEGATIVE — SIGNIFICANT CHANGE UP
LEUKOCYTE ESTERASE UR-ACNC: NEGATIVE — SIGNIFICANT CHANGE UP
LYMPHOCYTES # BLD AUTO: 0.81 K/UL — LOW (ref 1.2–3.4)
LYMPHOCYTES # BLD AUTO: 11 % — LOW (ref 20.5–51.1)
MCHC RBC-ENTMCNC: 30.6 G/DL — LOW (ref 32–37)
MCHC RBC-ENTMCNC: 31.2 G/DL
MCHC RBC-ENTMCNC: 31.2 PG — HIGH (ref 27–31)
MCHC RBC-ENTMCNC: 31.3 PG
MCV RBC AUTO: 100.4 FL
MCV RBC AUTO: 102.1 FL — HIGH (ref 80–94)
MONOCYTES # BLD AUTO: 1.32 K/UL — HIGH (ref 0.1–0.6)
MONOCYTES NFR BLD AUTO: 18 % — HIGH (ref 1.7–9.3)
NEUTROPHILS # BLD AUTO: 4.31 K/UL — SIGNIFICANT CHANGE UP (ref 1.4–6.5)
NEUTROPHILS NFR BLD AUTO: 58.6 % — SIGNIFICANT CHANGE UP (ref 42.2–75.2)
NITRITE UR-MCNC: NEGATIVE — SIGNIFICANT CHANGE UP
NRBC # BLD: 0 /100 WBCS — SIGNIFICANT CHANGE UP (ref 0–0)
PH UR: 6.5 — SIGNIFICANT CHANGE UP (ref 5–8)
PLATELET # BLD AUTO: 175 K/UL
PLATELET # BLD AUTO: 203 K/UL — SIGNIFICANT CHANGE UP (ref 130–400)
PMV BLD: 10.5 FL
POCT-PROTHROMBIN TIME: 46.1 SECS
POTASSIUM SERPL-MCNC: 5 MMOL/L — SIGNIFICANT CHANGE UP (ref 3.5–5)
POTASSIUM SERPL-SCNC: 5 MMOL/L — SIGNIFICANT CHANGE UP (ref 3.5–5)
PROT SERPL-MCNC: 7.1 G/DL — SIGNIFICANT CHANGE UP (ref 6–8)
PROT UR-MCNC: ABNORMAL
PROTHROM AB SERPL-ACNC: >40 SEC — HIGH (ref 9.95–12.87)
QUALITY CONTROL: YES
RBC # BLD: 2.75 M/UL
RBC # BLD: 2.85 M/UL — LOW (ref 4.7–6.1)
RBC # FLD: 17.4 %
RBC # FLD: 17.7 % — HIGH (ref 11.5–14.5)
RBC CASTS # UR COMP ASSIST: 2 /HPF — SIGNIFICANT CHANGE UP (ref 0–4)
SODIUM SERPL-SCNC: 142 MMOL/L — SIGNIFICANT CHANGE UP (ref 135–146)
SP GR SPEC: 1.01 — SIGNIFICANT CHANGE UP (ref 1.01–1.03)
UROBILINOGEN FLD QL: SIGNIFICANT CHANGE UP
WBC # BLD: 7.35 K/UL — SIGNIFICANT CHANGE UP (ref 4.8–10.8)
WBC # FLD AUTO: 7.35 K/UL — SIGNIFICANT CHANGE UP (ref 4.8–10.8)
WBC # FLD AUTO: 8.48 K/UL
WBC UR QL: 5 /HPF — SIGNIFICANT CHANGE UP (ref 0–5)

## 2021-03-25 PROCEDURE — 93010 ELECTROCARDIOGRAM REPORT: CPT

## 2021-03-25 RX ORDER — ERYTHROPOIETIN 10000 [IU]/ML
0 INJECTION, SOLUTION INTRAVENOUS; SUBCUTANEOUS
Qty: 0 | Refills: 0 | DISCHARGE

## 2021-03-25 RX ORDER — GARLIC 1000 MG
0 CAPSULE ORAL
Qty: 0 | Refills: 0 | DISCHARGE

## 2021-03-25 RX ORDER — ERYTHROPOIETIN 10000 [IU]/ML
30000 INJECTION, SOLUTION INTRAVENOUS; SUBCUTANEOUS ONCE
Refills: 0 | Status: COMPLETED | OUTPATIENT
Start: 2021-03-25 | End: 2021-03-25

## 2021-03-25 RX ADMIN — ERYTHROPOIETIN 30000 UNIT(S): 10000 INJECTION, SOLUTION INTRAVENOUS; SUBCUTANEOUS at 11:28

## 2021-03-25 NOTE — H&P PST ADULT - OTHER CARE PROVIDERS
Dr. Batista (cardiac) LV 2020 Dr. Kirkpatrick (pulmonary) LV 1/2021 Dr. Kleiner (renal) LV over a year ago

## 2021-03-25 NOTE — H&P PST ADULT - REASON FOR ADMISSION
69 yo male presents for PAST in preparation for cystoscopy transurethral resection of bladder tumor on 4/15/2021 under general anesthesia by Dr. Montero ([facility]).

## 2021-03-25 NOTE — H&P PST ADULT - NSICDXPASTSURGICALHX_GEN_ALL_CORE_FT
PAST SURGICAL HISTORY:  Cataract extraction status of eye, right     H/O detached retina repair     History of surgery TURBT    S/P rotator cuff repair left

## 2021-03-25 NOTE — H&P PST ADULT - ATTENDING COMMENTS
High grade T1 tcca bladder for possible turbt.  risks, benefirs, alternatives discussed including post op cath, perforation, bleeding, infection High grade T1 tcca bladder for possible turbt.  risks, benefits, alternatives discussed including post op cath, perforation, bleeding, infection    preop INR 1.8 therefore at risk for bleeding.  procedure will need to be postponed pending normalization of inr off coumadin

## 2021-03-25 NOTE — H&P PST ADULT - HISTORY OF PRESENT ILLNESS
Pt has hx of bladder CA. Pt states "I had this procedure before but I guess the tumor is a bit aggressive and now I need to do it again." Pt denies any symptoms at this time. Now for listed procedure. Denies any chest pain, difficulty breathing, SOB, palpitations, dysuria, URI, or any other infections in the last 2 weeks. Denies any recent travel, contact, or exposure to any persons with known or suspected COVID-19. Pt also denies COVID testing within the last 2 weeks. Denies any suicidal or homicidal ideations. Pt advised to self quarantine until day of procedure. Poor exercise tolerance as pt is unable to climb a flight of stairs without dyspnea. LUPE reviewed with patient. Pt verbalized understanding of all pre-operative instructions.    Anesthesia Alert  YES--Difficult Airway: Class IV  NO--History of neck surgery or radiation  NO--Limited ROM of neck  NO--History of Malignant hyperthermia  NO--No personal or family history of Pseudocholinesterase deficiency.  NO--Prior Anesthesia Complication  NO--Latex Allergy  NO--Loose teeth  NO--History of Rheumatoid Arthritis  YES--LUPE  NO--Other_____

## 2021-03-26 LAB
CULTURE RESULTS: SIGNIFICANT CHANGE UP
SPECIMEN SOURCE: SIGNIFICANT CHANGE UP

## 2021-03-29 DIAGNOSIS — Z02.9 ENCOUNTER FOR ADMINISTRATIVE EXAMINATIONS, UNSPECIFIED: ICD-10-CM

## 2021-04-01 ENCOUNTER — APPOINTMENT (OUTPATIENT)
Dept: MEDICATION MANAGEMENT | Facility: CLINIC | Age: 71
End: 2021-04-01

## 2021-04-01 ENCOUNTER — LABORATORY RESULT (OUTPATIENT)
Age: 71
End: 2021-04-01

## 2021-04-01 ENCOUNTER — OUTPATIENT (OUTPATIENT)
Dept: OUTPATIENT SERVICES | Facility: HOSPITAL | Age: 71
LOS: 1 days | Discharge: HOME | End: 2021-04-01

## 2021-04-01 ENCOUNTER — APPOINTMENT (OUTPATIENT)
Dept: INFUSION THERAPY | Facility: CLINIC | Age: 71
End: 2021-04-01

## 2021-04-01 VITALS — OXYGEN SATURATION: 99 % | RESPIRATION RATE: 16 BRPM | HEART RATE: 72 BPM

## 2021-04-01 DIAGNOSIS — Z79.01 LONG TERM (CURRENT) USE OF ANTICOAGULANTS: ICD-10-CM

## 2021-04-01 DIAGNOSIS — Z98.890 OTHER SPECIFIED POSTPROCEDURAL STATES: Chronic | ICD-10-CM

## 2021-04-01 DIAGNOSIS — Z98.41 CATARACT EXTRACTION STATUS, RIGHT EYE: Chronic | ICD-10-CM

## 2021-04-01 DIAGNOSIS — I48.91 UNSPECIFIED ATRIAL FIBRILLATION: ICD-10-CM

## 2021-04-01 LAB
INR PPP: 2.4 RATIO
POCT-PROTHROMBIN TIME: 28.5 SECS
QUALITY CONTROL: YES

## 2021-04-01 RX ORDER — ERYTHROPOIETIN 10000 [IU]/ML
30000 INJECTION, SOLUTION INTRAVENOUS; SUBCUTANEOUS ONCE
Refills: 0 | Status: COMPLETED | OUTPATIENT
Start: 2021-04-01 | End: 2021-04-01

## 2021-04-01 RX ADMIN — ERYTHROPOIETIN 30000 UNIT(S): 10000 INJECTION, SOLUTION INTRAVENOUS; SUBCUTANEOUS at 11:14

## 2021-04-02 LAB
HCT VFR BLD CALC: 27.3 %
HGB BLD-MCNC: 8.6 G/DL
MCHC RBC-ENTMCNC: 31.5 G/DL
MCHC RBC-ENTMCNC: 31.9 PG
MCV RBC AUTO: 101.1 FL
PLATELET # BLD AUTO: 156 K/UL
PMV BLD: 10.9 FL
RBC # BLD: 2.7 M/UL
RBC # FLD: 17.7 %
WBC # FLD AUTO: 8.01 K/UL

## 2021-04-03 ENCOUNTER — LABORATORY RESULT (OUTPATIENT)
Age: 71
End: 2021-04-03

## 2021-04-04 LAB
25(OH)D3 SERPL-MCNC: 75 NG/ML
BASOPHILS # BLD AUTO: 0 K/UL
BASOPHILS NFR BLD AUTO: 0 %
CALCIUM SERPL-MCNC: 8.8 MG/DL
CHOLEST SERPL-MCNC: 165 MG/DL
CREAT SPEC-SCNC: 42 MG/DL
EOSINOPHIL # BLD AUTO: 0.41 K/UL
EOSINOPHIL NFR BLD AUTO: 6 %
ESTIMATED AVERAGE GLUCOSE: 154 MG/DL
HBA1C MFR BLD HPLC: 7 %
HCT VFR BLD CALC: 29.1 %
HDLC SERPL-MCNC: 45 MG/DL
HGB BLD-MCNC: 8.7 G/DL
LDLC SERPL CALC-MCNC: 112 MG/DL
LYMPHOCYTES # BLD AUTO: 0.97 K/UL
LYMPHOCYTES NFR BLD AUTO: 14 %
MAN DIFF?: NORMAL
MCHC RBC-ENTMCNC: 29.9 G/DL
MCHC RBC-ENTMCNC: 31.3 PG
MCV RBC AUTO: 104.7 FL
MICROALBUMIN 24H UR DL<=1MG/L-MCNC: 36.7 MG/DL
MICROALBUMIN/CREAT 24H UR-RTO: 878 MG/G
MONOCYTES # BLD AUTO: 1.38 K/UL
MONOCYTES NFR BLD AUTO: 20 %
NEUTROPHILS # BLD AUTO: 4 K/UL
NEUTROPHILS NFR BLD AUTO: 48 %
NONHDLC SERPL-MCNC: 120 MG/DL
PARATHYROID HORMONE INTACT: 257 PG/ML
PHOSPHATE SERPL-MCNC: 5.9 MG/DL
PLATELET # BLD AUTO: 174 K/UL
RBC # BLD: 2.78 M/UL
RBC # FLD: 17.9 %
T3FREE SERPL-MCNC: 2.18 PG/ML
T4 FREE SERPL-MCNC: 1.3 NG/DL
TRIGL SERPL-MCNC: 58 MG/DL
TSH SERPL-ACNC: 0.88 UIU/ML
VIT B12 SERPL-MCNC: >2000 PG/ML
WBC # FLD AUTO: 6.9 K/UL

## 2021-04-05 LAB — 24R-OH-CALCIDIOL SERPL-MCNC: 52.6 PG/ML

## 2021-04-08 ENCOUNTER — OUTPATIENT (OUTPATIENT)
Dept: OUTPATIENT SERVICES | Facility: HOSPITAL | Age: 71
LOS: 1 days | Discharge: HOME | End: 2021-04-08

## 2021-04-08 ENCOUNTER — APPOINTMENT (OUTPATIENT)
Dept: INFUSION THERAPY | Facility: CLINIC | Age: 71
End: 2021-04-08

## 2021-04-08 ENCOUNTER — LABORATORY RESULT (OUTPATIENT)
Age: 71
End: 2021-04-08

## 2021-04-08 DIAGNOSIS — Z01.818 ENCOUNTER FOR OTHER PREPROCEDURAL EXAMINATION: ICD-10-CM

## 2021-04-08 DIAGNOSIS — D64.9 ANEMIA, UNSPECIFIED: ICD-10-CM

## 2021-04-08 DIAGNOSIS — Z98.890 OTHER SPECIFIED POSTPROCEDURAL STATES: Chronic | ICD-10-CM

## 2021-04-08 DIAGNOSIS — C67.9 MALIGNANT NEOPLASM OF BLADDER, UNSPECIFIED: ICD-10-CM

## 2021-04-08 DIAGNOSIS — Z98.41 CATARACT EXTRACTION STATUS, RIGHT EYE: Chronic | ICD-10-CM

## 2021-04-08 LAB
HCT VFR BLD CALC: 27.4 %
HGB BLD-MCNC: 8.6 G/DL
MCHC RBC-ENTMCNC: 31.4 G/DL
MCHC RBC-ENTMCNC: 31.7 PG
MCV RBC AUTO: 101.1 FL
PLATELET # BLD AUTO: 169 K/UL
PMV BLD: 11 FL
RBC # BLD: 2.71 M/UL
RBC # FLD: 17.5 %
WBC # FLD AUTO: 8.19 K/UL

## 2021-04-08 RX ORDER — ERYTHROPOIETIN 10000 [IU]/ML
30000 INJECTION, SOLUTION INTRAVENOUS; SUBCUTANEOUS ONCE
Refills: 0 | Status: COMPLETED | OUTPATIENT
Start: 2021-04-08 | End: 2021-04-08

## 2021-04-08 RX ADMIN — ERYTHROPOIETIN 30000 UNIT(S): 10000 INJECTION, SOLUTION INTRAVENOUS; SUBCUTANEOUS at 11:17

## 2021-04-12 ENCOUNTER — OUTPATIENT (OUTPATIENT)
Dept: OUTPATIENT SERVICES | Facility: HOSPITAL | Age: 71
LOS: 1 days | Discharge: HOME | End: 2021-04-12

## 2021-04-12 ENCOUNTER — LABORATORY RESULT (OUTPATIENT)
Age: 71
End: 2021-04-12

## 2021-04-12 ENCOUNTER — APPOINTMENT (OUTPATIENT)
Dept: ENDOCRINOLOGY | Facility: CLINIC | Age: 71
End: 2021-04-12
Payer: MEDICARE

## 2021-04-12 VITALS
OXYGEN SATURATION: 98 % | BODY MASS INDEX: 36.14 KG/M2 | WEIGHT: 244 LBS | HEIGHT: 69 IN | SYSTOLIC BLOOD PRESSURE: 130 MMHG | TEMPERATURE: 97.4 F | HEART RATE: 84 BPM | DIASTOLIC BLOOD PRESSURE: 78 MMHG

## 2021-04-12 DIAGNOSIS — Z98.890 OTHER SPECIFIED POSTPROCEDURAL STATES: Chronic | ICD-10-CM

## 2021-04-12 DIAGNOSIS — Z11.59 ENCOUNTER FOR SCREENING FOR OTHER VIRAL DISEASES: ICD-10-CM

## 2021-04-12 DIAGNOSIS — Z98.41 CATARACT EXTRACTION STATUS, RIGHT EYE: Chronic | ICD-10-CM

## 2021-04-12 PROCEDURE — 99213 OFFICE O/P EST LOW 20 MIN: CPT

## 2021-04-12 NOTE — PHYSICAL EXAM
[Alert] : alert [Well Nourished] : well nourished [No Acute Distress] : no acute distress [Well Developed] : well developed [Normal Sclera/Conjunctiva] : normal sclera/conjunctiva [EOMI] : extra ocular movement intact [No Proptosis] : no proptosis [Normal Oropharynx] : the oropharynx was normal [Thyroid Not Enlarged] : the thyroid was not enlarged [No Respiratory Distress] : no respiratory distress [No Thyroid Nodules] : no palpable thyroid nodules [No Accessory Muscle Use] : no accessory muscle use [Clear to Auscultation] : lungs were clear to auscultation bilaterally [Normal S1, S2] : normal S1 and S2 [Normal Rate] : heart rate was normal [Regular Rhythm] : with a regular rhythm [No Edema] : no peripheral edema [Pedal Pulses Normal] : the pedal pulses are present [Normal Bowel Sounds] : normal bowel sounds [Not Tender] : non-tender [Not Distended] : not distended [Soft] : abdomen soft [Normal Anterior Cervical Nodes] : no anterior cervical lymphadenopathy [Normal Posterior Cervical Nodes] : no posterior cervical lymphadenopathy [Spine Straight] : spine straight [No Spinal Tenderness] : no spinal tenderness [No Stigmata of Cushings Syndrome] : no stigmata of Cushings Syndrome [Normal Gait] : normal gait [Normal Strength/Tone] : muscle strength and tone were normal [No Rash] : no rash [Normal Reflexes] : deep tendon reflexes were 2+ and symmetric [No Tremors] : no tremors [Oriented x3] : oriented to person, place, and time [Acanthosis Nigricans] : no acanthosis nigricans

## 2021-04-12 NOTE — ASSESSMENT
[FreeTextEntry1] : I had a long discussion with patient regarding diabetes control including home readings. Goal HbA1c (7.0)  was discussed and counseling provided regarding diet/exercise and complications of diabetes (renal and cardiac and neurologic as well as  and need for eye exams and examination of feet. Lipids and importance of BP control also reviewed. HbA1c currently at 7.0 and microalbumin 878 mcg/mg creatinine.\par Pt with CKD  CMP ordered but not done Values from 11/2020 were BUN/Creatinine  94/6.0, H&H  8.7/29.1 EGFR 9 . Pt with efforts at goal HbA1c, control of BP and low protein diet, with ACE inhibitors. 25 Vit D was 75 and 1,25 normal at 52.6 (N 19.9 - 79.3)  To f/u with [facility].\par Lipid levels were reviewed with patient and importance and function of LDL, HDL and triglycerides discussed. Methods to increase HDL (exercise, fish, beans, oat meal, legumes etc.) discussed with pt. in conjunction with measures to decrease LDL and triglycerides  including diet and exercise.LDL/HDL was down from 148/43 => 112/45 . Current regimen of treatment to continue. Risks of statins were discussed in detail. \par \par Pt. has Calcium of  8.8. The PTH is elevated at 257 ( N < 65)  The pt. has been counseled to take extra fluids daily. Symptoms of hypercalcemia including polydipsia, polyuria, gastrointestinal pains, kidney stones, ulcers, bone pain , weakness and constipation were  all reviewed. Alternatives for treatment and prognosis were reviewed in detail.\par \par \par Pt. with recent detached retina and  also  hematuria and notes from Dr Montero regarding bladder cancer read and appreciated and discussed with patient.\par TFTs were fine,B12 and Vit D ( 75) normal.

## 2021-04-14 ENCOUNTER — OUTPATIENT (OUTPATIENT)
Dept: OUTPATIENT SERVICES | Facility: HOSPITAL | Age: 71
LOS: 1 days | Discharge: HOME | End: 2021-04-14

## 2021-04-14 ENCOUNTER — APPOINTMENT (OUTPATIENT)
Dept: MEDICATION MANAGEMENT | Facility: CLINIC | Age: 71
End: 2021-04-14

## 2021-04-14 ENCOUNTER — APPOINTMENT (OUTPATIENT)
Dept: INFUSION THERAPY | Facility: CLINIC | Age: 71
End: 2021-04-14

## 2021-04-14 ENCOUNTER — LABORATORY RESULT (OUTPATIENT)
Age: 71
End: 2021-04-14

## 2021-04-14 VITALS — RESPIRATION RATE: 16 BRPM | OXYGEN SATURATION: 98 % | HEART RATE: 76 BPM

## 2021-04-14 DIAGNOSIS — Z98.890 OTHER SPECIFIED POSTPROCEDURAL STATES: Chronic | ICD-10-CM

## 2021-04-14 DIAGNOSIS — Z98.41 CATARACT EXTRACTION STATUS, RIGHT EYE: Chronic | ICD-10-CM

## 2021-04-14 DIAGNOSIS — I48.91 UNSPECIFIED ATRIAL FIBRILLATION: ICD-10-CM

## 2021-04-14 DIAGNOSIS — Z79.01 LONG TERM (CURRENT) USE OF ANTICOAGULANTS: ICD-10-CM

## 2021-04-14 LAB
HCT VFR BLD CALC: 26.6 %
HGB BLD-MCNC: 8.3 G/DL
INR PPP: 1.9 RATIO
MCHC RBC-ENTMCNC: 31.2 G/DL
MCHC RBC-ENTMCNC: 31.6 PG
MCV RBC AUTO: 101.1 FL
PLATELET # BLD AUTO: 149 K/UL
PMV BLD: 11.2 FL
POCT-PROTHROMBIN TIME: 22 SECS
QUALITY CONTROL: YES
RBC # BLD: 2.63 M/UL
RBC # FLD: 17.3 %
WBC # FLD AUTO: 7.43 K/UL

## 2021-04-14 RX ORDER — ERYTHROPOIETIN 10000 [IU]/ML
30000 INJECTION, SOLUTION INTRAVENOUS; SUBCUTANEOUS ONCE
Refills: 0 | Status: COMPLETED | OUTPATIENT
Start: 2021-04-14 | End: 2021-04-14

## 2021-04-14 RX ADMIN — ERYTHROPOIETIN 30000 UNIT(S): 10000 INJECTION, SOLUTION INTRAVENOUS; SUBCUTANEOUS at 11:40

## 2021-04-19 ENCOUNTER — OUTPATIENT (OUTPATIENT)
Dept: OUTPATIENT SERVICES | Facility: HOSPITAL | Age: 71
LOS: 1 days | Discharge: HOME | End: 2021-04-19

## 2021-04-19 ENCOUNTER — LABORATORY RESULT (OUTPATIENT)
Age: 71
End: 2021-04-19

## 2021-04-19 DIAGNOSIS — Z98.41 CATARACT EXTRACTION STATUS, RIGHT EYE: Chronic | ICD-10-CM

## 2021-04-19 DIAGNOSIS — Z11.59 ENCOUNTER FOR SCREENING FOR OTHER VIRAL DISEASES: ICD-10-CM

## 2021-04-19 DIAGNOSIS — Z98.890 OTHER SPECIFIED POSTPROCEDURAL STATES: Chronic | ICD-10-CM

## 2021-04-20 ENCOUNTER — OUTPATIENT (OUTPATIENT)
Dept: OUTPATIENT SERVICES | Facility: HOSPITAL | Age: 71
LOS: 1 days | Discharge: HOME | End: 2021-04-20

## 2021-04-20 ENCOUNTER — APPOINTMENT (OUTPATIENT)
Dept: MEDICATION MANAGEMENT | Facility: CLINIC | Age: 71
End: 2021-04-20

## 2021-04-20 VITALS — OXYGEN SATURATION: 98 % | RESPIRATION RATE: 16 BRPM | HEART RATE: 76 BPM

## 2021-04-20 VITALS — HEART RATE: 76 BPM | RESPIRATION RATE: 16 BRPM | OXYGEN SATURATION: 98 %

## 2021-04-20 DIAGNOSIS — Z98.890 OTHER SPECIFIED POSTPROCEDURAL STATES: Chronic | ICD-10-CM

## 2021-04-20 DIAGNOSIS — Z79.01 LONG TERM (CURRENT) USE OF ANTICOAGULANTS: ICD-10-CM

## 2021-04-20 DIAGNOSIS — I48.91 UNSPECIFIED ATRIAL FIBRILLATION: ICD-10-CM

## 2021-04-20 DIAGNOSIS — Z98.41 CATARACT EXTRACTION STATUS, RIGHT EYE: Chronic | ICD-10-CM

## 2021-04-20 LAB
INR PPP: 1.4 RATIO
POCT-PROTHROMBIN TIME: 16.4 SECS
QUALITY CONTROL: YES

## 2021-04-21 ENCOUNTER — LABORATORY RESULT (OUTPATIENT)
Age: 71
End: 2021-04-21

## 2021-04-21 ENCOUNTER — APPOINTMENT (OUTPATIENT)
Dept: INFUSION THERAPY | Facility: CLINIC | Age: 71
End: 2021-04-21

## 2021-04-21 LAB
HCT VFR BLD CALC: 27.3 %
HGB BLD-MCNC: 8.6 G/DL
MCHC RBC-ENTMCNC: 31.5 G/DL
MCHC RBC-ENTMCNC: 32.3 PG
MCV RBC AUTO: 102.6 FL
PLATELET # BLD AUTO: 159 K/UL
PMV BLD: 10.9 FL
RBC # BLD: 2.66 M/UL
RBC # FLD: 17.5 %
WBC # FLD AUTO: 8.4 K/UL

## 2021-04-21 RX ORDER — ERYTHROPOIETIN 10000 [IU]/ML
30000 INJECTION, SOLUTION INTRAVENOUS; SUBCUTANEOUS ONCE
Refills: 0 | Status: COMPLETED | OUTPATIENT
Start: 2021-04-21 | End: 2021-04-21

## 2021-04-21 RX ADMIN — ERYTHROPOIETIN 30000 UNIT(S): 10000 INJECTION, SOLUTION INTRAVENOUS; SUBCUTANEOUS at 11:29

## 2021-04-22 ENCOUNTER — APPOINTMENT (OUTPATIENT)
Dept: UROLOGY | Facility: HOSPITAL | Age: 71
End: 2021-04-22

## 2021-04-26 ENCOUNTER — LABORATORY RESULT (OUTPATIENT)
Age: 71
End: 2021-04-26

## 2021-04-26 ENCOUNTER — OUTPATIENT (OUTPATIENT)
Dept: OUTPATIENT SERVICES | Facility: HOSPITAL | Age: 71
LOS: 1 days | Discharge: HOME | End: 2021-04-26

## 2021-04-26 DIAGNOSIS — Z98.890 OTHER SPECIFIED POSTPROCEDURAL STATES: Chronic | ICD-10-CM

## 2021-04-26 DIAGNOSIS — Z98.41 CATARACT EXTRACTION STATUS, RIGHT EYE: Chronic | ICD-10-CM

## 2021-04-26 DIAGNOSIS — Z11.59 ENCOUNTER FOR SCREENING FOR OTHER VIRAL DISEASES: ICD-10-CM

## 2021-04-28 ENCOUNTER — APPOINTMENT (OUTPATIENT)
Dept: INFUSION THERAPY | Facility: CLINIC | Age: 71
End: 2021-04-28

## 2021-04-28 ENCOUNTER — LABORATORY RESULT (OUTPATIENT)
Age: 71
End: 2021-04-28

## 2021-04-28 LAB
HCT VFR BLD CALC: 26.9 %
HGB BLD-MCNC: 8.3 G/DL
MCHC RBC-ENTMCNC: 30.9 G/DL
MCHC RBC-ENTMCNC: 31.8 PG
MCV RBC AUTO: 103.1 FL
PLATELET # BLD AUTO: 134 K/UL
PMV BLD: 11.1 FL
RBC # BLD: 2.61 M/UL
RBC # FLD: 17.6 %
WBC # FLD AUTO: 5.54 K/UL

## 2021-04-28 RX ORDER — ERYTHROPOIETIN 10000 [IU]/ML
30000 INJECTION, SOLUTION INTRAVENOUS; SUBCUTANEOUS ONCE
Refills: 0 | Status: COMPLETED | OUTPATIENT
Start: 2021-04-28 | End: 2021-04-28

## 2021-04-28 RX ADMIN — ERYTHROPOIETIN 30000 UNIT(S): 10000 INJECTION, SOLUTION INTRAVENOUS; SUBCUTANEOUS at 11:07

## 2021-04-28 NOTE — ASU PATIENT PROFILE, ADULT - PSH
Cataract extraction status of eye, right  left eye cataract  H/O detached retina repair  b/l eyes  History of surgery  TURBT  S/P rotator cuff repair  left

## 2021-04-29 ENCOUNTER — RESULT REVIEW (OUTPATIENT)
Age: 71
End: 2021-04-29

## 2021-04-29 ENCOUNTER — APPOINTMENT (OUTPATIENT)
Dept: MEDICATION MANAGEMENT | Facility: CLINIC | Age: 71
End: 2021-04-29

## 2021-04-29 ENCOUNTER — APPOINTMENT (OUTPATIENT)
Dept: UROLOGY | Facility: HOSPITAL | Age: 71
End: 2021-04-29

## 2021-04-29 ENCOUNTER — OUTPATIENT (OUTPATIENT)
Dept: OUTPATIENT SERVICES | Facility: HOSPITAL | Age: 71
LOS: 1 days | Discharge: HOME | End: 2021-04-29
Payer: MEDICARE

## 2021-04-29 VITALS
SYSTOLIC BLOOD PRESSURE: 169 MMHG | DIASTOLIC BLOOD PRESSURE: 74 MMHG | HEART RATE: 73 BPM | RESPIRATION RATE: 18 BRPM | OXYGEN SATURATION: 100 % | WEIGHT: 244.93 LBS | HEIGHT: 69 IN | TEMPERATURE: 97 F

## 2021-04-29 VITALS
HEART RATE: 71 BPM | RESPIRATION RATE: 18 BRPM | OXYGEN SATURATION: 98 % | SYSTOLIC BLOOD PRESSURE: 133 MMHG | DIASTOLIC BLOOD PRESSURE: 65 MMHG

## 2021-04-29 DIAGNOSIS — Z98.890 OTHER SPECIFIED POSTPROCEDURAL STATES: Chronic | ICD-10-CM

## 2021-04-29 DIAGNOSIS — Z98.41 CATARACT EXTRACTION STATUS, RIGHT EYE: Chronic | ICD-10-CM

## 2021-04-29 LAB — GLUCOSE BLDC GLUCOMTR-MCNC: 115 MG/DL — HIGH (ref 70–99)

## 2021-04-29 PROCEDURE — 52240 CYSTOSCOPY AND TREATMENT: CPT

## 2021-04-29 PROCEDURE — 88307 TISSUE EXAM BY PATHOLOGIST: CPT | Mod: 26

## 2021-04-29 RX ORDER — ONDANSETRON 8 MG/1
4 TABLET, FILM COATED ORAL ONCE
Refills: 0 | Status: DISCONTINUED | OUTPATIENT
Start: 2021-04-29 | End: 2021-05-14

## 2021-04-29 RX ORDER — CEPHALEXIN 500 MG
1 CAPSULE ORAL
Qty: 12 | Refills: 0
Start: 2021-04-29 | End: 2021-05-01

## 2021-04-29 RX ORDER — HYDROMORPHONE HYDROCHLORIDE 2 MG/ML
0.5 INJECTION INTRAMUSCULAR; INTRAVENOUS; SUBCUTANEOUS
Refills: 0 | Status: DISCONTINUED | OUTPATIENT
Start: 2021-04-29 | End: 2021-04-29

## 2021-04-29 RX ORDER — SODIUM CHLORIDE 9 MG/ML
1000 INJECTION, SOLUTION INTRAVENOUS
Refills: 0 | Status: DISCONTINUED | OUTPATIENT
Start: 2021-04-29 | End: 2021-05-14

## 2021-04-29 RX ORDER — MORPHINE SULFATE 50 MG/1
2 CAPSULE, EXTENDED RELEASE ORAL
Refills: 0 | Status: DISCONTINUED | OUTPATIENT
Start: 2021-04-29 | End: 2021-04-29

## 2021-04-29 RX ORDER — OXYCODONE AND ACETAMINOPHEN 5; 325 MG/1; MG/1
1 TABLET ORAL EVERY 4 HOURS
Refills: 0 | Status: DISCONTINUED | OUTPATIENT
Start: 2021-04-29 | End: 2021-04-29

## 2021-04-29 NOTE — PRE-ANESTHESIA EVALUATION ADULT - TEMPERATURE IN CELSIUS (DEGREES C)
----- Message from Peace Avila sent at 8/13/2020  3:24 PM CDT -----  Prpe ?  Call [phone]     36.1

## 2021-04-29 NOTE — BRIEF OPERATIVE NOTE - NSICDXBRIEFPROCEDURE_GEN_ALL_CORE_FT
PROCEDURES:  Cystoscopy with fulguration and resection of bladder tumor 29-Apr-2021 19:24:56  Neto Montero

## 2021-04-29 NOTE — PACU DISCHARGE NOTE - COMMENTS
70 y o male S/P Cystoscopy, Transurethral Resection of Bladder Tumor, GETA without complications. VS /61 HR 67 RR 16 T 97.6 SaO2 95%. Pt tolerated procedure well.

## 2021-04-29 NOTE — ASU DISCHARGE PLAN (ADULT/PEDIATRIC) - CARE PROVIDER_API CALL
Neto Montero)  Urology  [address] [place] [address], [address]  [place] [address]  Phone: [phone]  Fax: [phone]  Follow Up Time: 1 week

## 2021-04-29 NOTE — CHART NOTE - NSCHARTNOTEFT_GEN_A_CORE
See history and physical.  Pt for TURBT.  Pt was postponed 2 weeks ago and 1 week ago for elevated INR.  INR now 1.1.  Will proceed with TURP.  Risks, benefits, alternatives discussed including catheter, bleeding, sepsis, perforation, need for subsequent treatments, recurrence. See history and physical.  Pt for TURBT.  Pt was postponed 2 weeks ago and 1 week ago for elevated INR.  INR now 1.1.  Will proceed with TURP.  Risks, benefits, alternatives discussed including catheter, bleeding, sepsis, perforation, need for subsequent treatments, recurrence. Pt with severe CKD and understands risks with this including worsening

## 2021-04-30 ENCOUNTER — NON-APPOINTMENT (OUTPATIENT)
Age: 71
End: 2021-04-30

## 2021-05-04 ENCOUNTER — APPOINTMENT (OUTPATIENT)
Dept: UROLOGY | Facility: CLINIC | Age: 71
End: 2021-05-04
Payer: MEDICARE

## 2021-05-04 LAB — SURGICAL PATHOLOGY STUDY: SIGNIFICANT CHANGE UP

## 2021-05-04 PROCEDURE — 99213 OFFICE O/P EST LOW 20 MIN: CPT

## 2021-05-04 NOTE — PHYSICAL EXAM
[General Appearance - In No Acute Distress] : no acute distress [Costovertebral Angle Tenderness] : no ~M costovertebral angle tenderness [Urethral Meatus] : meatus normal [Scrotum] : the scrotum was normal [] : no respiratory distress [Oriented To Time, Place, And Person] : oriented to person, place, and time

## 2021-05-04 NOTE — ASSESSMENT
[FreeTextEntry1] : Stone removed for trial of voiding. He will start his anticoagulant. Fully discussed with patient and his wife. Pathology pending. Likely will need referral to oncology and uro-oncology. Treatment options depend on pathology but will consider intravesical therapies, radiation, chemotherapy, cystectomy and diversion. Patient likely not a good surgical candidate due to significant comorbidities however. Also renal failure may require tailoring of chemotherapy if chemotherapy needed

## 2021-05-04 NOTE — REVIEW OF SYSTEMS
[Fever] : no fever [Chest Pain] : no chest pain [Cough] : no cough [Constipation] : no constipation [Dysuria] : no dysuria [Confused] : no confusion

## 2021-05-04 NOTE — HISTORY OF PRESENT ILLNESS
[FreeTextEntry1] : 70-year-old with high-grade bladder cancer 5 days post TURBT. Tumor with high-grade and invasive. Pathology pending. Urine remains clear via catheter. He is not started anticoagulant

## 2021-05-06 ENCOUNTER — LABORATORY RESULT (OUTPATIENT)
Age: 71
End: 2021-05-06

## 2021-05-06 ENCOUNTER — APPOINTMENT (OUTPATIENT)
Dept: INFUSION THERAPY | Facility: CLINIC | Age: 71
End: 2021-05-06

## 2021-05-06 LAB
HCT VFR BLD CALC: 27.7 %
HGB BLD-MCNC: 8.7 G/DL
MCHC RBC-ENTMCNC: 31.4 G/DL
MCHC RBC-ENTMCNC: 33 PG
MCV RBC AUTO: 104.9 FL
PLATELET # BLD AUTO: 140 K/UL
PMV BLD: 11.3 FL
RBC # BLD: 2.64 M/UL
RBC # FLD: 18.1 %
WBC # FLD AUTO: 8.25 K/UL

## 2021-05-06 RX ORDER — ERYTHROPOIETIN 10000 [IU]/ML
30000 INJECTION, SOLUTION INTRAVENOUS; SUBCUTANEOUS ONCE
Refills: 0 | Status: COMPLETED | OUTPATIENT
Start: 2021-05-06 | End: 2021-05-06

## 2021-05-06 RX ADMIN — ERYTHROPOIETIN 30000 UNIT(S): 10000 INJECTION, SOLUTION INTRAVENOUS; SUBCUTANEOUS at 11:18

## 2021-05-07 DIAGNOSIS — Z79.82 LONG TERM (CURRENT) USE OF ASPIRIN: ICD-10-CM

## 2021-05-07 DIAGNOSIS — Z79.4 LONG TERM (CURRENT) USE OF INSULIN: ICD-10-CM

## 2021-05-07 DIAGNOSIS — E11.22 TYPE 2 DIABETES MELLITUS WITH DIABETIC CHRONIC KIDNEY DISEASE: ICD-10-CM

## 2021-05-07 DIAGNOSIS — Z79.01 LONG TERM (CURRENT) USE OF ANTICOAGULANTS: ICD-10-CM

## 2021-05-07 DIAGNOSIS — Z88.8 ALLERGY STATUS TO OTHER DRUGS, MEDICAMENTS AND BIOLOGICAL SUBSTANCES STATUS: ICD-10-CM

## 2021-05-07 DIAGNOSIS — I48.91 UNSPECIFIED ATRIAL FIBRILLATION: ICD-10-CM

## 2021-05-07 DIAGNOSIS — D09.0 CARCINOMA IN SITU OF BLADDER: ICD-10-CM

## 2021-05-07 DIAGNOSIS — N40.0 BENIGN PROSTATIC HYPERPLASIA WITHOUT LOWER URINARY TRACT SYMPTOMS: ICD-10-CM

## 2021-05-07 DIAGNOSIS — D63.1 ANEMIA IN CHRONIC KIDNEY DISEASE: ICD-10-CM

## 2021-05-07 DIAGNOSIS — N18.9 CHRONIC KIDNEY DISEASE, UNSPECIFIED: ICD-10-CM

## 2021-05-07 DIAGNOSIS — Z99.89 DEPENDENCE ON OTHER ENABLING MACHINES AND DEVICES: ICD-10-CM

## 2021-05-07 DIAGNOSIS — G47.33 OBSTRUCTIVE SLEEP APNEA (ADULT) (PEDIATRIC): ICD-10-CM

## 2021-05-07 DIAGNOSIS — I12.9 HYPERTENSIVE CHRONIC KIDNEY DISEASE WITH STAGE 1 THROUGH STAGE 4 CHRONIC KIDNEY DISEASE, OR UNSPECIFIED CHRONIC KIDNEY DISEASE: ICD-10-CM

## 2021-05-07 DIAGNOSIS — M10.30 GOUT DUE TO RENAL IMPAIRMENT, UNSPECIFIED SITE: ICD-10-CM

## 2021-05-11 ENCOUNTER — APPOINTMENT (OUTPATIENT)
Dept: UROLOGY | Facility: CLINIC | Age: 71
End: 2021-05-11
Payer: MEDICARE

## 2021-05-11 VITALS — BODY MASS INDEX: 36.43 KG/M2 | HEIGHT: 69 IN | TEMPERATURE: 98 F | WEIGHT: 246 LBS

## 2021-05-11 DIAGNOSIS — R35.0 FREQUENCY OF MICTURITION: ICD-10-CM

## 2021-05-11 PROCEDURE — 99214 OFFICE O/P EST MOD 30 MIN: CPT

## 2021-05-11 NOTE — REVIEW OF SYSTEMS
[Feeling Poorly] : not feeling poorly [Feeling Tired] : not feeling tired [Nasal Discharge] : no nasal discharge [Chest Pain] : no chest pain [Cough] : no cough [Constipation] : no constipation [Diarrhea] : no diarrhea [Dysuria] : no dysuria [Confused] : no confusion

## 2021-05-11 NOTE — HISTORY OF PRESENT ILLNESS
[FreeTextEntry1] : 70-year-old with history of bladder cancer 2 weeks post TURBT. Patient urinates with some frequency but no hematuria. No abdominal pain. Pathology shows T1 high-grade with negative muscle. 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

## 2021-05-11 NOTE — ASSESSMENT
[FreeTextEntry1] : I discussed with patient and his wife his pathology. I discussed risks benefits and alternatives and recommend intravesical BCG treatment. Risk of BCGitis discussed. Urine culture prior. I discussed the case with uro oncologist Dr. Espinal who agrees with the plan and will oversee the BCG treatments. Patient understands the need for follow up cystoscopy post treatment and possibility of progression and recurrence

## 2021-05-13 ENCOUNTER — LABORATORY RESULT (OUTPATIENT)
Age: 71
End: 2021-05-13

## 2021-05-13 ENCOUNTER — OUTPATIENT (OUTPATIENT)
Dept: OUTPATIENT SERVICES | Facility: HOSPITAL | Age: 71
LOS: 1 days | Discharge: HOME | End: 2021-05-13

## 2021-05-13 ENCOUNTER — APPOINTMENT (OUTPATIENT)
Dept: MEDICATION MANAGEMENT | Facility: CLINIC | Age: 71
End: 2021-05-13

## 2021-05-13 ENCOUNTER — APPOINTMENT (OUTPATIENT)
Dept: INFUSION THERAPY | Facility: CLINIC | Age: 71
End: 2021-05-13

## 2021-05-13 VITALS — HEART RATE: 67 BPM | RESPIRATION RATE: 18 BRPM | OXYGEN SATURATION: 96 %

## 2021-05-13 DIAGNOSIS — Z98.41 CATARACT EXTRACTION STATUS, RIGHT EYE: Chronic | ICD-10-CM

## 2021-05-13 DIAGNOSIS — I48.91 UNSPECIFIED ATRIAL FIBRILLATION: ICD-10-CM

## 2021-05-13 DIAGNOSIS — Z98.890 OTHER SPECIFIED POSTPROCEDURAL STATES: Chronic | ICD-10-CM

## 2021-05-13 DIAGNOSIS — Z79.01 LONG TERM (CURRENT) USE OF ANTICOAGULANTS: ICD-10-CM

## 2021-05-13 LAB
BACTERIA UR CULT: NORMAL
HCT VFR BLD CALC: 27.7 %
HGB BLD-MCNC: 8.8 G/DL
INR PPP: 2.3 RATIO
MCHC RBC-ENTMCNC: 31.8 G/DL
MCHC RBC-ENTMCNC: 32.8 PG
MCV RBC AUTO: 103.4 FL
PLATELET # BLD AUTO: 136 K/UL
PMV BLD: 11.2 FL
POCT-PROTHROMBIN TIME: 27.7 SECS
QUALITY CONTROL: YES
RBC # BLD: 2.68 M/UL
RBC # FLD: 17.4 %
WBC # FLD AUTO: 6.69 K/UL

## 2021-05-13 RX ORDER — ERYTHROPOIETIN 10000 [IU]/ML
30000 INJECTION, SOLUTION INTRAVENOUS; SUBCUTANEOUS ONCE
Refills: 0 | Status: COMPLETED | OUTPATIENT
Start: 2021-05-13 | End: 2021-05-13

## 2021-05-13 RX ADMIN — ERYTHROPOIETIN 30000 UNIT(S): 10000 INJECTION, SOLUTION INTRAVENOUS; SUBCUTANEOUS at 12:19

## 2021-05-17 ENCOUNTER — RX RENEWAL (OUTPATIENT)
Age: 71
End: 2021-05-17

## 2021-05-20 ENCOUNTER — APPOINTMENT (OUTPATIENT)
Dept: INFUSION THERAPY | Facility: CLINIC | Age: 71
End: 2021-05-20
Payer: MEDICARE

## 2021-05-20 ENCOUNTER — LABORATORY RESULT (OUTPATIENT)
Age: 71
End: 2021-05-20

## 2021-05-20 ENCOUNTER — APPOINTMENT (OUTPATIENT)
Dept: HEMATOLOGY ONCOLOGY | Facility: CLINIC | Age: 71
End: 2021-05-20
Payer: MEDICARE

## 2021-05-20 VITALS
WEIGHT: 246 LBS | HEIGHT: 69 IN | SYSTOLIC BLOOD PRESSURE: 143 MMHG | RESPIRATION RATE: 14 BRPM | DIASTOLIC BLOOD PRESSURE: 65 MMHG | HEART RATE: 72 BPM | BODY MASS INDEX: 36.43 KG/M2 | TEMPERATURE: 97.6 F

## 2021-05-20 DIAGNOSIS — D64.9 ANEMIA, UNSPECIFIED: ICD-10-CM

## 2021-05-20 LAB
ALBUMIN SERPL ELPH-MCNC: 4.1 G/DL
ALP BLD-CCNC: 101 U/L
ALT SERPL-CCNC: 20 U/L
ANION GAP SERPL CALC-SCNC: 16 MMOL/L
AST SERPL-CCNC: 20 U/L
BILIRUB SERPL-MCNC: 0.6 MG/DL
BUN SERPL-MCNC: 106 MG/DL
CALCIUM SERPL-MCNC: 8.4 MG/DL
CHLORIDE SERPL-SCNC: 104 MMOL/L
CO2 SERPL-SCNC: 21 MMOL/L
CREAT SERPL-MCNC: 5.8 MG/DL
GLUCOSE SERPL-MCNC: 68 MG/DL
HCT VFR BLD CALC: 26.7 %
HGB BLD-MCNC: 8.2 G/DL
IRON SATN MFR SERPL: 16 %
IRON SERPL-MCNC: 40 UG/DL
MCHC RBC-ENTMCNC: 30.7 G/DL
MCHC RBC-ENTMCNC: 31.9 PG
MCV RBC AUTO: 103.9 FL
PLATELET # BLD AUTO: 131 K/UL
PMV BLD: 11.6 FL
POTASSIUM SERPL-SCNC: 4.9 MMOL/L
PROT SERPL-MCNC: 6.8 G/DL
RBC # BLD: 2.57 M/UL
RBC # FLD: 17.4 %
SODIUM SERPL-SCNC: 141 MMOL/L
TIBC SERPL-MCNC: 256 UG/DL
UIBC SERPL-MCNC: 216 UG/DL
WBC # FLD AUTO: 7.51 K/UL

## 2021-05-20 PROCEDURE — 99214 OFFICE O/P EST MOD 30 MIN: CPT

## 2021-05-20 RX ORDER — OMEGA-3-ACID ETHYL ESTERS 1 G/1
CAPSULE, LIQUID FILLED ORAL
Refills: 0 | Status: DISCONTINUED | COMMUNITY
End: 2021-05-20

## 2021-05-20 RX ORDER — ERYTHROPOIETIN 10000 [IU]/ML
30000 INJECTION, SOLUTION INTRAVENOUS; SUBCUTANEOUS ONCE
Refills: 0 | Status: COMPLETED | OUTPATIENT
Start: 2021-05-20 | End: 2021-05-20

## 2021-05-20 RX ORDER — ASPIRIN 81 MG
81 TABLET, DELAYED RELEASE (ENTERIC COATED) ORAL
Refills: 0 | Status: DISCONTINUED | COMMUNITY
End: 2021-05-20

## 2021-05-20 RX ORDER — COLCHICINE 0.6 MG/1
0.6 TABLET ORAL
Refills: 0 | Status: DISCONTINUED | COMMUNITY
End: 2021-05-20

## 2021-05-20 RX ADMIN — ERYTHROPOIETIN 30000 UNIT(S): 10000 INJECTION, SOLUTION INTRAVENOUS; SUBCUTANEOUS at 10:56

## 2021-05-20 NOTE — HISTORY OF PRESENT ILLNESS
[Disease:__________________________] : Disease: [unfilled] [de-identified] : 5/20/2021:\par The patient is coming for his regularly scheduled follow up for his multifactorial anemia but mostly secondary to inflammation and his kidney disease.\par Recently he was diagnosed with stage 1 bladder cancer . He had intermittent gross hematuria, he had a TURBT , and now he is scheduled to receive intravesical BCG. He was offered hemodialysis for his worsening kidney function , but he is declining it as of yet. \par He reported fatigue, insomnia, shortness of breath , but denied any current hematuria or GI bleeding. No other complaints. \par

## 2021-05-20 NOTE — PHYSICAL EXAM
[Restricted in physically strenuous activity but ambulatory and able to carry out work of a light or sedentary nature] : Status 1- Restricted in physically strenuous activity but ambulatory and able to carry out work of a light or sedentary nature, e.g., light house work, office work [Obese] : obese [Normal] : no peripheral adenopathy appreciated [de-identified] : Venous stasis changes present bilaterally with hyperpigmentation of the skin. + LL edema [de-identified] : Obese. No palpable liver, spleen or other abnormal masses. [de-identified] : Mild arthritic changes [de-identified] : See Vascular above [de-identified] : Gait is normal.

## 2021-05-20 NOTE — ASSESSMENT
[FreeTextEntry1] : # Macrocytic anemia,  multifactorial from chronic inflammation and chronic kidney disease, on Retacrit 96179 units every week. His hemoglobin has been stable around 8.\par Will continue with weekly Retacrit. Will hold it if Hgb 10.5 or above.\par We will also check vitamin MMA and B 12 levels, as well as iron studies despite the macrocytic indexes. He was advised to restart taking iron pills for now, and continue with folate.\par His bone marrow studies a few years ago were non-diagnostic. \par \par # New diagnosis of stage 1 bladder cancer, s/p TURBT, scheduled to start intravesical BCG. \par \par The patient will call next week for results.\par He does not need to see us more frequently than every 6 months and as needed, but the H/H will be monitored closely for treatment purposes.\par \par All questions answered.\par \par The patient was seen and examined by Dr Hooper who agreed with the above plan.

## 2021-05-20 NOTE — REVIEW OF SYSTEMS
[Fatigue] : fatigue [Lower Ext Edema] : lower extremity edema [SOB on Exertion] : shortness of breath during exertion [Joint Pain] : joint pain [Joint Stiffness] : joint stiffness [Skin Wound] : skin wound [Insomnia] : insomnia [Negative] : Heme/Lymph [de-identified] : Secondary to sleep apnea

## 2021-05-21 LAB
FERRITIN SERPL-MCNC: 120 NG/ML
VIT B12 SERPL-MCNC: >2000 PG/ML

## 2021-05-26 ENCOUNTER — OUTPATIENT (OUTPATIENT)
Dept: OUTPATIENT SERVICES | Facility: HOSPITAL | Age: 71
LOS: 1 days | Discharge: HOME | End: 2021-05-26

## 2021-05-26 ENCOUNTER — APPOINTMENT (OUTPATIENT)
Dept: UROLOGY | Facility: CLINIC | Age: 71
End: 2021-05-26
Payer: MEDICARE

## 2021-05-26 VITALS
HEART RATE: 77 BPM | SYSTOLIC BLOOD PRESSURE: 121 MMHG | WEIGHT: 246 LBS | HEIGHT: 69 IN | TEMPERATURE: 97.3 F | DIASTOLIC BLOOD PRESSURE: 61 MMHG | BODY MASS INDEX: 36.43 KG/M2 | RESPIRATION RATE: 14 BRPM

## 2021-05-26 DIAGNOSIS — Z98.41 CATARACT EXTRACTION STATUS, RIGHT EYE: Chronic | ICD-10-CM

## 2021-05-26 DIAGNOSIS — Z98.890 OTHER SPECIFIED POSTPROCEDURAL STATES: Chronic | ICD-10-CM

## 2021-05-26 PROCEDURE — 51720 TREATMENT OF BLADDER LESION: CPT

## 2021-05-26 RX ORDER — BACILLUS CALMETTE-GUERIN SUBSTRAIN CONNAUGHT LIVE ANTIGEN 81 MG/3ML
50 INJECTION, POWDER, LYOPHILIZED, FOR SOLUTION INTRAVESICAL ONCE
Refills: 0 | Status: COMPLETED | OUTPATIENT
Start: 2021-05-26 | End: 2021-05-26

## 2021-05-26 RX ADMIN — BACILLUS CALMETTE-GUERIN SUBSTRAIN CONNAUGHT LIVE ANTIGEN 50 MILLIGRAM(S): 81 INJECTION, POWDER, LYOPHILIZED, FOR SOLUTION INTRAVESICAL at 16:22

## 2021-05-27 ENCOUNTER — APPOINTMENT (OUTPATIENT)
Dept: INFUSION THERAPY | Facility: CLINIC | Age: 71
End: 2021-05-27

## 2021-05-27 ENCOUNTER — LABORATORY RESULT (OUTPATIENT)
Age: 71
End: 2021-05-27

## 2021-05-27 DIAGNOSIS — C67.9 MALIGNANT NEOPLASM OF BLADDER, UNSPECIFIED: ICD-10-CM

## 2021-05-27 LAB
HCT VFR BLD CALC: 26.8 %
HGB BLD-MCNC: 8.4 G/DL
MCHC RBC-ENTMCNC: 31.3 G/DL
MCHC RBC-ENTMCNC: 32.1 PG
MCV RBC AUTO: 102.3 FL
PLATELET # BLD AUTO: 164 K/UL
PMV BLD: 11.2 FL
RBC # BLD: 2.62 M/UL
RBC # FLD: 17 %
WBC # FLD AUTO: 6.47 K/UL

## 2021-05-27 RX ORDER — ERYTHROPOIETIN 10000 [IU]/ML
30000 INJECTION, SOLUTION INTRAVENOUS; SUBCUTANEOUS ONCE
Refills: 0 | Status: COMPLETED | OUTPATIENT
Start: 2021-05-27 | End: 2021-05-27

## 2021-05-27 RX ADMIN — ERYTHROPOIETIN 30000 UNIT(S): 10000 INJECTION, SOLUTION INTRAVENOUS; SUBCUTANEOUS at 11:01

## 2021-06-02 ENCOUNTER — OUTPATIENT (OUTPATIENT)
Dept: OUTPATIENT SERVICES | Facility: HOSPITAL | Age: 71
LOS: 1 days | Discharge: HOME | End: 2021-06-02

## 2021-06-02 ENCOUNTER — APPOINTMENT (OUTPATIENT)
Dept: UROLOGY | Facility: CLINIC | Age: 71
End: 2021-06-02
Payer: MEDICARE

## 2021-06-02 VITALS
RESPIRATION RATE: 14 BRPM | WEIGHT: 246 LBS | DIASTOLIC BLOOD PRESSURE: 60 MMHG | SYSTOLIC BLOOD PRESSURE: 134 MMHG | HEART RATE: 77 BPM | BODY MASS INDEX: 36.43 KG/M2 | HEIGHT: 69 IN | TEMPERATURE: 97.7 F

## 2021-06-02 DIAGNOSIS — Z98.890 OTHER SPECIFIED POSTPROCEDURAL STATES: Chronic | ICD-10-CM

## 2021-06-02 DIAGNOSIS — Z98.41 CATARACT EXTRACTION STATUS, RIGHT EYE: Chronic | ICD-10-CM

## 2021-06-02 PROCEDURE — 51720 TREATMENT OF BLADDER LESION: CPT

## 2021-06-02 RX ORDER — BACILLUS CALMETTE-GUERIN SUBSTRAIN CONNAUGHT LIVE ANTIGEN 81 MG/3ML
50 INJECTION, POWDER, LYOPHILIZED, FOR SOLUTION INTRAVESICAL ONCE
Refills: 0 | Status: COMPLETED | OUTPATIENT
Start: 2021-06-02 | End: 2021-06-02

## 2021-06-02 RX ADMIN — BACILLUS CALMETTE-GUERIN SUBSTRAIN CONNAUGHT LIVE ANTIGEN 50 MILLIGRAM(S): 81 INJECTION, POWDER, LYOPHILIZED, FOR SOLUTION INTRAVESICAL at 16:53

## 2021-06-03 ENCOUNTER — LABORATORY RESULT (OUTPATIENT)
Age: 71
End: 2021-06-03

## 2021-06-03 ENCOUNTER — APPOINTMENT (OUTPATIENT)
Dept: INFUSION THERAPY | Facility: CLINIC | Age: 71
End: 2021-06-03

## 2021-06-03 ENCOUNTER — OUTPATIENT (OUTPATIENT)
Dept: OUTPATIENT SERVICES | Facility: HOSPITAL | Age: 71
LOS: 1 days | Discharge: HOME | End: 2021-06-03

## 2021-06-03 ENCOUNTER — APPOINTMENT (OUTPATIENT)
Dept: MEDICATION MANAGEMENT | Facility: CLINIC | Age: 71
End: 2021-06-03

## 2021-06-03 DIAGNOSIS — Z98.890 OTHER SPECIFIED POSTPROCEDURAL STATES: Chronic | ICD-10-CM

## 2021-06-03 DIAGNOSIS — Z79.01 LONG TERM (CURRENT) USE OF ANTICOAGULANTS: ICD-10-CM

## 2021-06-03 DIAGNOSIS — Z98.41 CATARACT EXTRACTION STATUS, RIGHT EYE: Chronic | ICD-10-CM

## 2021-06-03 DIAGNOSIS — I48.91 UNSPECIFIED ATRIAL FIBRILLATION: ICD-10-CM

## 2021-06-03 DIAGNOSIS — C67.9 MALIGNANT NEOPLASM OF BLADDER, UNSPECIFIED: ICD-10-CM

## 2021-06-03 LAB
HCT VFR BLD CALC: 27.9 %
HGB BLD-MCNC: 8.6 G/DL
INR PPP: 4.1 RATIO
MCHC RBC-ENTMCNC: 30.8 G/DL
MCHC RBC-ENTMCNC: 32.3 PG
MCV RBC AUTO: 104.9 FL
PLATELET # BLD AUTO: 155 K/UL
PMV BLD: 11.6 FL
POCT-PROTHROMBIN TIME: 49.5 SECS
QUALITY CONTROL: YES
RBC # BLD: 2.66 M/UL
RBC # FLD: 17.4 %
WBC # FLD AUTO: 6.92 K/UL

## 2021-06-03 RX ORDER — ERYTHROPOIETIN 10000 [IU]/ML
30000 INJECTION, SOLUTION INTRAVENOUS; SUBCUTANEOUS ONCE
Refills: 0 | Status: COMPLETED | OUTPATIENT
Start: 2021-06-03 | End: 2021-06-03

## 2021-06-03 RX ADMIN — ERYTHROPOIETIN 30000 UNIT(S): 10000 INJECTION, SOLUTION INTRAVENOUS; SUBCUTANEOUS at 10:25

## 2021-06-09 ENCOUNTER — APPOINTMENT (OUTPATIENT)
Dept: UROLOGY | Facility: CLINIC | Age: 71
End: 2021-06-09
Payer: MEDICARE

## 2021-06-09 ENCOUNTER — OUTPATIENT (OUTPATIENT)
Dept: OUTPATIENT SERVICES | Facility: HOSPITAL | Age: 71
LOS: 1 days | Discharge: HOME | End: 2021-06-09

## 2021-06-09 VITALS
HEART RATE: 80 BPM | DIASTOLIC BLOOD PRESSURE: 63 MMHG | RESPIRATION RATE: 14 BRPM | WEIGHT: 246 LBS | TEMPERATURE: 97.6 F | SYSTOLIC BLOOD PRESSURE: 142 MMHG | HEIGHT: 69 IN | BODY MASS INDEX: 36.43 KG/M2

## 2021-06-09 DIAGNOSIS — Z98.890 OTHER SPECIFIED POSTPROCEDURAL STATES: Chronic | ICD-10-CM

## 2021-06-09 DIAGNOSIS — Z98.41 CATARACT EXTRACTION STATUS, RIGHT EYE: Chronic | ICD-10-CM

## 2021-06-09 PROCEDURE — 51720 TREATMENT OF BLADDER LESION: CPT

## 2021-06-09 RX ORDER — BACILLUS CALMETTE-GUERIN SUBSTRAIN CONNAUGHT LIVE ANTIGEN 81 MG/3ML
50 INJECTION, POWDER, LYOPHILIZED, FOR SOLUTION INTRAVESICAL ONCE
Refills: 0 | Status: COMPLETED | OUTPATIENT
Start: 2021-06-09 | End: 2021-06-09

## 2021-06-09 RX ADMIN — BACILLUS CALMETTE-GUERIN SUBSTRAIN CONNAUGHT LIVE ANTIGEN 50 MILLIGRAM(S): 81 INJECTION, POWDER, LYOPHILIZED, FOR SOLUTION INTRAVESICAL at 18:08

## 2021-06-10 ENCOUNTER — APPOINTMENT (OUTPATIENT)
Dept: INFUSION THERAPY | Facility: CLINIC | Age: 71
End: 2021-06-10

## 2021-06-10 ENCOUNTER — LABORATORY RESULT (OUTPATIENT)
Age: 71
End: 2021-06-10

## 2021-06-10 LAB
HCT VFR BLD CALC: 26.4 %
HGB BLD-MCNC: 8.3 G/DL
MCHC RBC-ENTMCNC: 31.4 G/DL
MCHC RBC-ENTMCNC: 32.8 PG
MCV RBC AUTO: 104.3 FL
PLATELET # BLD AUTO: 129 K/UL
PMV BLD: 11.2 FL
RBC # BLD: 2.53 M/UL
RBC # FLD: 18.1 %
WBC # FLD AUTO: 7.22 K/UL

## 2021-06-10 RX ORDER — ERYTHROPOIETIN 10000 [IU]/ML
30000 INJECTION, SOLUTION INTRAVENOUS; SUBCUTANEOUS ONCE
Refills: 0 | Status: COMPLETED | OUTPATIENT
Start: 2021-06-10 | End: 2021-06-10

## 2021-06-10 RX ADMIN — ERYTHROPOIETIN 30000 UNIT(S): 10000 INJECTION, SOLUTION INTRAVENOUS; SUBCUTANEOUS at 11:46

## 2021-06-11 DIAGNOSIS — C67.9 MALIGNANT NEOPLASM OF BLADDER, UNSPECIFIED: ICD-10-CM

## 2021-06-16 ENCOUNTER — APPOINTMENT (OUTPATIENT)
Dept: UROLOGY | Facility: CLINIC | Age: 71
End: 2021-06-16
Payer: MEDICARE

## 2021-06-16 ENCOUNTER — OUTPATIENT (OUTPATIENT)
Dept: OUTPATIENT SERVICES | Facility: HOSPITAL | Age: 71
LOS: 1 days | Discharge: HOME | End: 2021-06-16

## 2021-06-16 VITALS
RESPIRATION RATE: 14 BRPM | DIASTOLIC BLOOD PRESSURE: 57 MMHG | TEMPERATURE: 98.6 F | HEART RATE: 80 BPM | BODY MASS INDEX: 36.43 KG/M2 | HEIGHT: 69 IN | WEIGHT: 246 LBS | SYSTOLIC BLOOD PRESSURE: 123 MMHG

## 2021-06-16 DIAGNOSIS — Z98.890 OTHER SPECIFIED POSTPROCEDURAL STATES: Chronic | ICD-10-CM

## 2021-06-16 DIAGNOSIS — Z98.41 CATARACT EXTRACTION STATUS, RIGHT EYE: Chronic | ICD-10-CM

## 2021-06-16 PROCEDURE — 51720 TREATMENT OF BLADDER LESION: CPT

## 2021-06-16 RX ORDER — BACILLUS CALMETTE-GUERIN SUBSTRAIN CONNAUGHT LIVE ANTIGEN 81 MG/3ML
50 INJECTION, POWDER, LYOPHILIZED, FOR SOLUTION INTRAVESICAL ONCE
Refills: 0 | Status: COMPLETED | OUTPATIENT
Start: 2021-06-16 | End: 2021-06-16

## 2021-06-16 RX ADMIN — BACILLUS CALMETTE-GUERIN SUBSTRAIN CONNAUGHT LIVE ANTIGEN 50 MILLIGRAM(S): 81 INJECTION, POWDER, LYOPHILIZED, FOR SOLUTION INTRAVESICAL at 15:45

## 2021-06-17 ENCOUNTER — LABORATORY RESULT (OUTPATIENT)
Age: 71
End: 2021-06-17

## 2021-06-17 ENCOUNTER — OUTPATIENT (OUTPATIENT)
Dept: OUTPATIENT SERVICES | Facility: HOSPITAL | Age: 71
LOS: 1 days | Discharge: HOME | End: 2021-06-17

## 2021-06-17 ENCOUNTER — APPOINTMENT (OUTPATIENT)
Dept: MEDICATION MANAGEMENT | Facility: CLINIC | Age: 71
End: 2021-06-17

## 2021-06-17 ENCOUNTER — APPOINTMENT (OUTPATIENT)
Dept: INFUSION THERAPY | Facility: CLINIC | Age: 71
End: 2021-06-17

## 2021-06-17 DIAGNOSIS — C67.9 MALIGNANT NEOPLASM OF BLADDER, UNSPECIFIED: ICD-10-CM

## 2021-06-17 DIAGNOSIS — I48.91 UNSPECIFIED ATRIAL FIBRILLATION: ICD-10-CM

## 2021-06-17 DIAGNOSIS — Z98.890 OTHER SPECIFIED POSTPROCEDURAL STATES: Chronic | ICD-10-CM

## 2021-06-17 DIAGNOSIS — Z79.01 LONG TERM (CURRENT) USE OF ANTICOAGULANTS: ICD-10-CM

## 2021-06-17 DIAGNOSIS — Z98.41 CATARACT EXTRACTION STATUS, RIGHT EYE: Chronic | ICD-10-CM

## 2021-06-17 LAB
HCT VFR BLD CALC: 27 %
HGB BLD-MCNC: 8.4 G/DL
INR PPP: 2.1 RATIO
MCHC RBC-ENTMCNC: 31.1 G/DL
MCHC RBC-ENTMCNC: 32.4 PG
MCV RBC AUTO: 104.2 FL
PLATELET # BLD AUTO: 124 K/UL
PMV BLD: 11.7 FL
POCT-PROTHROMBIN TIME: 34.7 SECS
QUALITY CONTROL: YES
RBC # BLD: 2.59 M/UL
RBC # FLD: 17.7 %
WBC # FLD AUTO: 6.91 K/UL

## 2021-06-17 RX ORDER — ERYTHROPOIETIN 10000 [IU]/ML
30000 INJECTION, SOLUTION INTRAVENOUS; SUBCUTANEOUS ONCE
Refills: 0 | Status: COMPLETED | OUTPATIENT
Start: 2021-06-17 | End: 2021-06-17

## 2021-06-17 RX ADMIN — ERYTHROPOIETIN 30000 UNIT(S): 10000 INJECTION, SOLUTION INTRAVENOUS; SUBCUTANEOUS at 10:40

## 2021-08-10 NOTE — HISTORY OF PRESENT ILLNESS
[FreeTextEntry1] : 70-year-old with high-grade bladder cancer stage TI along with CIS. He underwent intravesical BCG therapy. Cystoscopy shows large recurrent bladder tumor left lateral wall. He continues to have numerous comorbidities including A. fib on Coumadin, chronic anemia, chronic renal failure, diabetes, sleep apnea.

## 2021-08-10 NOTE — ASSESSMENT
[FreeTextEntry1] : Recurrent bladder cancer he will require TURBT if can be medically optimized. We'll need to stop Coumadin 4 days prior and possibly longer. Patient has significant comorbidities and will need medical and cardiac clearance. High-grade recurrent bladder cancer will likely require subsequent therapies postop including possible second course of BCG versus chemotherapy radiation. Patient unlikely to be a good candidate for cystectomy due to severe comorbidities

## 2021-08-10 NOTE — PHYSICAL EXAM
[General Appearance - In No Acute Distress] : no acute distress [Abdomen Soft] : soft [Costovertebral Angle Tenderness] : no ~M costovertebral angle tenderness [Urethral Meatus] : meatus normal [Scrotum] : the scrotum was normal [FreeTextEntry1] : Bilateral lower extremity edema [Oriented To Time, Place, And Person] : oriented to person, place, and time [Normal Station and Gait] : the gait and station were normal for the patient's age

## 2021-08-10 NOTE — REVIEW OF SYSTEMS
[Feeling Poorly] : not feeling poorly [Shortness Of Breath] : no shortness of breath [Cough] : no cough [Constipation] : no constipation [Dysuria] : no dysuria [Confused] : no confusion

## 2021-08-12 NOTE — H&P PST ADULT - HISTORY OF PRESENT ILLNESS
Pt denies cp palp uri cough . ET: 1 Fos with SOB .  patient has sob easily while walking  PT denies any open wounds, drainage or rashes. denies hx of covid-denies recent cough fever or infection. aware to self quaerantine preop. all instructions reviewed.    scheduled for 8/26 cysto TURBT. HX BLADDER CANCER  X 5 MONTHS. PATIENT HAD PRIOR BLADDER PROCEDURES  X 2 TIMES.     PATIENT HAS C/O HEMATURIA INTERMITTENT X 5 MONTHS.  C/O DYSURIA AT TIMES X 5 MONTHS . DENIES PAIN CURRENTLY.  PT ON COUMADIN X MANY YEARS FOR HX AFIB-FOLLOWS COUMADIN [place] . PT ADVISED TO FOLLOW UP REGARDING COUMADIN DR ROBISON AND COUMADIN CENTER TO ADVISE. PT STATES HE IS NON COMPLIANT WITH HIS ASA - HAS NOT TAKEN IN MANY WEEKS.   PRIOR CXR 1/2021 ON HIE AND CHART    HX OF IRON INFUSION -FOLLOWS DR ODOM IN [facility].  HX OF PRBC TRANSFUSION LAST WEEK PER PATIENT. PATIENT WAS ADVISED TO F/U WITH DR LI ENDOCRINE REGARDING 8/25 PM DOSE  PM DOSE INSULIN HALF OR HOLD  PT COMPLIANT AND HOLD ALL DIABETIC MEDS DOP.ADVISED TO BRING CPAP MACHINE DOP    Anesthesia Alert  yes  -Difficult Airway IV  NO--History of neck surgery or radiation  NO--Limited ROM of neck  NO--History of Malignant hyperthermia  NO--Personal or family history of Pseudocholinesterase deficiency.  NO--Prior Anesthesia Complication  NO--Latex Allergy  NO--Loose teeth  NO--History of Rheumatoid Arthritis  YES --LUPE  ON CPAP  yes --Bleeding risk  COUMADIN  NO--Other_____           Pt denies cp palp uri cough . ET: 1 Fos with SOB .  patient has sob easily while walking  PT denies any open wounds, drainage or rashes. denies hx of covid-denies recent cough fever or infection. aware to self quaerantine preop. all instructions reviewed.    scheduled for 8/26 cysto TURBT. HX BLADDER CANCER  X 5 MONTHS. PATIENT HAD PRIOR BLADDER PROCEDURES  X 2 TIMES.     PATIENT HAS C/O HEMATURIA INTERMITTENT X 5 MONTHS.  C/O DYSURIA AT TIMES X 5 MONTHS . DENIES PAIN CURRENTLY.  PT ON COUMADIN X MANY YEARS FOR HX AFIB-FOLLOWS COUMADIN [place] . PT ADVISED TO FOLLOW UP REGARDING COUMADIN DR ROBISON AND COUMADIN CENTER TO ADVISE. PT STATES HE IS NON COMPLIANT WITH HIS ASA - HAS NOT TAKEN IN MANY WEEKS.   PRIOR CXR 1/2021 ON HIE AND CHART    HX OF IRON INFUSION -FOLLOWS DR [facility]OM IN [facility].  HX OF PRBC TRANSFUSION LAST WEEK PER PATIENT. PATIENT WAS ADVISED TO F/U WITH DR LI ENDOCRINE REGARDING 8/25 PM DOSE  INSULIN HALF OR HOLD-  PT COMPLIANT AND advised HOLD ALL DIABETIC MEDS DOP.   ADVISED TO BRING CPAP MACHINE DOP    Anesthesia Alert  yes  -Difficult Airway IV  NO--History of neck surgery or radiation  NO--Limited ROM of neck  NO--History of Malignant hyperthermia  NO--Personal or family history of Pseudocholinesterase deficiency.  NO--Prior Anesthesia Complication  NO--Latex Allergy  NO--Loose teeth  NO--History of Rheumatoid Arthritis  YES --LUPE  ON CPAP  yes --Bleeding risk  COUMADIN  NO--Other_____

## 2021-08-12 NOTE — H&P PST ADULT - NSICDXPASTSURGICALHX_GEN_ALL_CORE_FT
PAST SURGICAL HISTORY:  Cataract extraction status of eye, right left eye cataract    H/O detached retina repair b/l eyes    History of surgery TURBT    S/P rotator cuff repair left     PAST SURGICAL HISTORY:  Cataract extraction status of eye, right left eye cataract    H/O cystoscopy     H/O detached retina repair b/l eyes    History of surgery TURBT    S/P rotator cuff repair left

## 2021-08-12 NOTE — H&P PST ADULT - ATTENDING COMMENTS
Pt with large recurrent high grade bladder cancer s/p resection X2, now with recurrence s/p bcg therapy.  pt has severe comorbidities including severe chronic kidney disease with increasing creat now 7.5.  pt continues to refuse dyalysis, but electolytes are normal and considered optimized, but high risk for procedure.  also a fib on coumadin.  coumadin held and inr yesterday 1.4 acceptable for todays procedure.  Pt absolutely understands the high risk of proceding including severe bleeding, worsening of renal function, death, sepsis, perforation, failure to cure, need for subequent therapies.  he understands there will be a post op catheter. His primary/cardiologist has cleared him for the procedure.

## 2021-08-12 NOTE — H&P PST ADULT - NSICDXPASTMEDICALHX_GEN_ALL_CORE_FT
PAST MEDICAL HISTORY:  Afib ON COUMADIN    Anemia weekly procrit      IRON INFUSIONS-FABRICIO    Chronic gout     CKD (chronic kidney disease)     Diabetes     Former smoker     HTN (hypertension)     Hypercholesterolemia     Obese     LUPE (obstructive sleep apnea)     Monse disease 2016

## 2021-08-17 PROBLEM — E78.00 PURE HYPERCHOLESTEROLEMIA, UNSPECIFIED: Chronic | Status: ACTIVE | Noted: 2021-01-01

## 2021-08-17 PROBLEM — E66.9 OBESITY, UNSPECIFIED: Chronic | Status: ACTIVE | Noted: 2021-01-01

## 2021-08-17 PROBLEM — Z87.891 PERSONAL HISTORY OF NICOTINE DEPENDENCE: Chronic | Status: ACTIVE | Noted: 2021-01-01

## 2021-08-26 NOTE — BRIEF OPERATIVE NOTE - OPERATION/FINDINGS
extensive broad based hemorrhgic bt.  all tumor resected or fulgurated. tumor involves entire left lateral wall and 50 percent anterior bladder

## 2021-08-26 NOTE — ASU PATIENT PROFILE, ADULT - NSICDXPASTSURGICALHX_GEN_ALL_CORE_FT
PAST SURGICAL HISTORY:  Cataract extraction status of eye, right left eye cataract    H/O cystoscopy     H/O detached retina repair b/l eyes    History of colonoscopy 2016    History of surgery TURBT    S/P rotator cuff repair left

## 2021-08-26 NOTE — ASU DISCHARGE PLAN (ADULT/PEDIATRIC) - CARE PROVIDER_API CALL
Neto Montero)  Urology  [address] [place] [address], [address]  [place] [address]  Phone: [phone]  Fax: [phone]  Follow Up Time: 1 week

## 2021-08-26 NOTE — BRIEF OPERATIVE NOTE - NSICDXBRIEFPROCEDURE_GEN_ALL_CORE_FT
PROCEDURES:  Cystoscopy with fulguration and resection of bladder tumor 26-Aug-2021 13:06:38  Neto Montero

## 2021-08-30 PROBLEM — N18.9 CRD (CHRONIC RENAL DISEASE): Status: ACTIVE | Noted: 2021-03-05

## 2021-08-30 NOTE — PHYSICAL EXAM
[General Appearance - In No Acute Distress] : no acute distress [Abdomen Tenderness] : non-tender [Urethral Meatus] : meatus normal [] : no respiratory distress [Oriented To Time, Place, And Person] : oriented to person, place, and time [Normal Station and Gait] : the gait and station were normal for the patient's age [FreeTextEntry1] : Severe bilateral lower extremity edema

## 2021-08-30 NOTE — ASSESSMENT
[FreeTextEntry1] : Fully discussed with patient.  Catheter removed for trial of void.\par \par Genital edema is a product of his systemic diseases especially his untreated CKD.  Recommend scrotal elevation.\par \par High-grade muscle invasive bladder cancer discussed fully with patient and his wife.  Patient would be high risk for cystectomy and urinary diversion and they are not interested in this approach anyway.  I also discussed with him the possibility of radiation therapy along with systemic chemotherapy however the chemotherapy would have to be tailored because of his kidney failure.  I recommend oncology consult and referred him to Dr. Bourgeois.  He is also considering going to [facility].

## 2021-08-30 NOTE — REVIEW OF SYSTEMS
[Feeling Poorly] : not feeling poorly [Feeling Tired] : not feeling tired [Chest Pain] : no chest pain [Shortness Of Breath] : no shortness of breath [Cough] : no cough [Constipation] : no constipation [Diarrhea] : no diarrhea [Confused] : no confusion

## 2021-08-30 NOTE — HISTORY OF PRESENT ILLNESS
[FreeTextEntry1] : 70-year-old with history of high-grade bladder cancer status post TURBT 4 days ago.  Patient uncomfortable with the catheter and bothered by penile and genital swelling.  He has a history of severe CKD and has refused dialysis with severe bilateral lower extremity and truncal edema.  Urine remains clear via catheter.\par \par Patient with our urging now has just seen nephrology and is scheduled to get dialysis catheter tomorrow and start dialysis this week.  Nephrologist Dr. Olivas recommend continue holding of Coumadin as he will get anticoagulation during dialysis.\par \par Pathology shows high-grade transitional cell carcinoma with squamous differentiation muscle invasive

## 2021-09-09 PROBLEM — I48.91 ATRIAL FIBRILLATION: Status: ACTIVE | Noted: 2017-09-21

## 2021-09-09 PROBLEM — Z51.81 ANTICOAGULATION GOAL OF INR 2 TO 3: Status: ACTIVE | Noted: 2020-08-14

## 2021-09-09 PROBLEM — Z79.01 LONG TERM (CURRENT) USE OF ANTICOAGULANTS: Status: ACTIVE | Noted: 2020-02-03

## 2021-09-14 PROBLEM — Z80.52 FAMILY HISTORY OF MALIGNANT NEOPLASM OF URINARY BLADDER: Status: ACTIVE | Noted: 2021-01-01

## 2021-09-14 NOTE — HISTORY OF PRESENT ILLNESS
[FreeTextEntry1] : Filemon Issa was seen in consultation with his wife for his bladder cancer.  \par \par He is a 70 year old with a history of superficial bladder cancer (treated with BCG and TURB) and failing renal failure (has declined dialysis until recently) who has been plagued with episodes of hematuria with outlet obstruction.  This has been going on for 10 yrs?  With the latest round of bleeding had a TURB on Aug 10 which found "high grade papillary urothelial carcinoma with focal squamous differentiation...invades into the muscularis" with "large recurrent bladder cancer of left lateral wall" (see above).  \par \par He is not deemed medically fit for a cystectomy.  He will see Dr. Hooper regarding possible chemotherapy.  \par \par He is here to discuss treatment options with radiation.

## 2021-09-14 NOTE — REVIEW OF SYSTEMS
[Patient Intake Form Reviewed] : Patient intake form was reviewed [Urinary Frequency] : urinary frequency [Anxiety] : anxiety [Depression] : depression [Easy Bleeding] : a tendency for easy bleeding [Negative] : Allergic/Immunologic [FreeTextEntry3] : blurred vision (seeing opthamologist) [FreeTextEntry5] : swelling of legs (before dialysis) [FreeTextEntry6] : SOB before dialysis [FreeTextEntry8] : blood in urine, kidney dysfunction, hemodialysis [de-identified] : low blood glucose [de-identified] : anemia

## 2021-09-14 NOTE — PHYSICAL EXAM
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  [de-identified] : No palpable nodes.  [de-identified] : Obese, soft, no palpable masses, no groin nodes.

## 2021-09-17 NOTE — REVIEW OF SYSTEMS
[Fatigue] : fatigue [Lower Ext Edema] : lower extremity edema [SOB on Exertion] : shortness of breath during exertion [Joint Pain] : joint pain [Joint Stiffness] : joint stiffness [Skin Wound] : skin wound [Insomnia] : insomnia [Negative] : Heme/Lymph [Recent Change In Weight] : ~T recent weight change [FreeTextEntry2] : Lost weight after starting HD (lost water weight) [FreeTextEntry3] : Decreased vision in b/l eyes.  [de-identified] : Secondary to sleep apnea

## 2021-09-17 NOTE — REASON FOR VISIT
[Follow-Up Visit] : a follow-up visit for [Spouse] : spouse [FreeTextEntry2] : Anemia of inflammation and kidney disease. Invasive bladder carcinoma

## 2021-09-17 NOTE — ASSESSMENT
[FreeTextEntry1] : # Recurrent bladder cancer- now high grade papillary urothelial carcinoma with focal squamous differentiation now muscle invasive \par H/O stage 1 bladder cancer Dx Jan 2021, s/p TURBT, s/p intravesical BCG. \par \par Was seen by Urology Dr. Montero -was deemed high risk for surgery.\par If this is localized disease and he is not a candidate for surgery then management would be with chemo-RT (which may be a little too harsh), versus RT alone. \par He met with Rad-Onc, Dr Lay, who is offering 40 Gy in 16 treatments. \par We could offer modified chemo- either with dose adjusted cisplatin (as pt is already on HD) or can consider non cisplatin options. The alternative is to proceed with RT alone.\par \par His last imaging was in Jan 2021. \par Ordered PET scan to r/o metastatic disease. \par Patient is planning to go for 2nd opinion at [facility] and was encouraged to do so. \par \par \par # Macrocytic anemia,  multifactorial from chronic inflammation and chronic kidney disease, hematuria, on Retacrit 96597 units every week (given during HD now). However, given the macrocytosis, a myelodysplastic process cannot be ruled out.\par Recent blood transfusion 1 week ago. \par \par Receiving Retacrit with HD\par Will hold Retacrit if Hgb 10.5 or above.\par B12- normal, Ferritin-120, Sat 16%, Iron-40. \par \par He was advised to restart taking iron pills for now, and continue with folate.\par His bone marrow studies a few years ago were non-diagnostic. He may need another one especially if the bladder situation is addressed successfully.\par \par He will follow up with us after PET scan and consultation with [facility]. \par He will call us earlier if any concerns or questions. \par All questions answered.\par \par The patient was seen and examined by Dr Hooper who agreed with the above plan.

## 2021-09-17 NOTE — PHYSICAL EXAM
[Restricted in physically strenuous activity but ambulatory and able to carry out work of a light or sedentary nature] : Status 1- Restricted in physically strenuous activity but ambulatory and able to carry out work of a light or sedentary nature, e.g., light house work, office work [Obese] : obese [Normal] : PERRL, EOMI, no conjunctival infection, anicteric [de-identified] : But fundoscopy not done. [de-identified] : Venous stasis changes present bilaterally with hyperpigmentation of the skin. + LL edema (better than prior) [de-identified] : Obese. No palpable liver, spleen or other abnormal masses. [de-identified] : Mild arthritic changes [de-identified] : See Vascular above

## 2021-09-17 NOTE — HISTORY OF PRESENT ILLNESS
[Disease:__________________________] : Disease: [unfilled] [Disease: _____________________] : Disease: [unfilled] [AJCC Stage: ____] : AJCC Stage: [unfilled] [de-identified] : The patient is here for follow up accompanied by his wife for diagnosis of recurrent bladder cancer now muscle invasive. \par He was having hematuria and his CT abdomen in Jan 2021 showed 2.9 cm bladder mass.  He was diagnosed with stage 1 bladder cancer had a TURBT and received intravesical BCG. He continued having persistent hematuria and underwent further urological evaluation. Cystoscopy showed large recurrent bladder tumor on the left lateral wall. He underwent transurethral resection of bladder tumor in August 2021 and pathology came back as high grade papillary urothelial carcinoma with focal squamous differentiation invading into the muscularis propria.\par He was evaluated by Urology, Dr Montero, and was deemed high risk for surgical resection and was referred to us for Chemo/RT options. \par He also met with Rad-Onc, Dr Lay. \par He was recently started on dialysis this month for worsening kidney function. \par \par He was transfused 1 unit PRBC last week for anemia 2/2 to hematuria and the kidney disease. He has decreased vision in b./l eyes for the last 1 week and saw ophthalmology and was told he had broken blood vessels and received an injection for it. \par Otherwise, prior to his vision changes, he has been very active, independent in ADLs and was driving his car. \par He has multiple co morbidities including DM, ESRD on HD, DM, HTN, DLD, A-fib on AC, LUPE. \par \par Pathology: Bladder tumor, transurethral resection: -  High grade papillary urothelial carcinoma with focal squamous differentiation\par -  Tumor invades into the muscularis propria. No definite lymphovascular invasion seen.  Foci of necrotizing and non-necrotizing epithelioid granulomas\par with multinucleated giant cells, consistent with clinical history of BCG therapy.\par \par CT A/p 1/5/21- IMPRESSION: Findings suspicious for left bladder neoplasm measuring up to 2.9 cm. Further evaluation is recommended. Small to moderate right pleural effusion and trace to small left pleural effusion with associated opacities. Right middle lobe 5 mm pulmonary nodule. Follow-up as per oncologic protocol.\par \par  [de-identified] : Transitional cell

## 2021-09-17 NOTE — DISCUSSION/SUMMARY
[FreeTextEntry1] : Received call from lab for Cr 7.5 and . Patient is aware of his kidney insufficiency and said he is following it with his PCP Dr. Pichardo, who also is aware of the blood work. He will followup with his PCP, he does not want dialysis. His K and Na are wnl.

## 2021-09-21 PROBLEM — N18.9 CHRONIC RENAL FAILURE: Status: ACTIVE | Noted: 2021-01-01

## 2021-09-21 PROBLEM — C67.9 BLADDER CANCER: Status: ACTIVE | Noted: 2021-01-13

## 2021-09-21 NOTE — ASSESSMENT
[FreeTextEntry1] : Patient's general medical condition looks improved since starting dialysis as he has lost significant amount of edema.  Patient is considering options and is going for opinion at [facility].  Return here after treatment.  PET scan was ordered by the oncologists and awaiting it to be done.  Intermittent hematuria.  Monitor hemoglobin.  He gets regular blood draws at dialysis

## 2021-09-21 NOTE — HISTORY OF PRESENT ILLNESS
[FreeTextEntry1] : 70-year-old with high-grade muscle invasive bladder cancer with history of TURBT and earlier in this year intravesical BCG.  He has seen medical oncology and radiation oncology and is considering his treatment options.  He has started dialysis.  Since starting dialysis he has intermittent gross hematuria

## 2021-09-24 PROBLEM — M89.9 BONE LESION: Status: ACTIVE | Noted: 2021-01-01

## 2021-09-28 NOTE — ADDENDUM
[FreeTextEntry1] : Prior to my entering room pt tripped and fell on scale when he was being weighed. He did not have LOC but had mild abrasion to his forehead. He denied dizziness and remainder of exam was uneventful with patient comfortable and fully oriented. He had been offered EMS but did not wish to as he felt well.

## 2021-09-28 NOTE — ASSESSMENT
[FreeTextEntry1] : Pt now on dialysis x last few weeks. \par I had a long discussion with patient regarding diabetes control including home readings. Goal HbA1c (< 7.0)  was discussed and counseling provided regarding diet/exercise and complications of diabetes (renal and cardiac and neurologic as well as  and need for eye exams and examination of feet. Lipids and importance of BP control also reviewed. HbA1c currently at 6.0 down from  7.0.\par Pt will d/c glimeperide and reduce lantus to 6 units hs. \par Pt with CKD.  BUN/Creatinine  42/4.45 H&H  7.7/25.2.. Pt with excellent  efforts at goal HbA1c, control of BP and low protein diet, with ACE inhibitors. 25  To f/u with [facility].\par Lipid levels were reviewed with patient and importance and function of LDL, HDL and triglycerides discussed. Methods to increase HDL (exercise, fish, beans, oat meal, legumes etc.) discussed with pt. in conjunction with measures to decrease LDL and triglycerides  including diet and exercise.LDL/HDL was 82/37. Current regimen of treatment to continue. Risks of statins were discussed in detail. \par \par Pt. has Calcium of  8.4. The PTH is elevated at 462 from 257 ( N < 65) \par \par Pt. with recent detached retina and  also  hematuria and notes from Dr Montero regarding bladder cancer read and appreciated and discussed with patient.\par TFTs were fine,B12 and Vit D ( 68.5 ) normal. \par PSA was fine at 0.73.

## 2021-10-04 NOTE — ED PROVIDER NOTE - WR ORDER STATUS 1
Received a message vis \"[facility]\" requesting a refill:  \"Yokasta VARELA tara would like a refill of the following medications:   pantoprazole (PROTONIX) 40 MG tablet [Chad Starkey, DO]     Preferred pharmacy: [id number] [place] [place] [facility] [address]\"     Spoke with [facility] who stated patient has #30/12 refills at the pharmacy of the Protonix. Refills are not needed at this time-       Performed

## 2021-10-04 NOTE — ED ADULT NURSE NOTE - NSIMPLEMENTINTERV_GEN_ALL_ED
Implemented All Fall Risk Interventions:  [place] to call system. Call bell, personal items and telephone within reach. Instruct patient to call for assistance. Room bathroom lighting operational. Non-slip footwear when patient is off stretcher. Physically safe environment: no spills, clutter or unnecessary equipment. Stretcher in lowest position, wheels locked, appropriate side rails in place. Provide visual cue, wrist band, yellow gown, etc. Monitor gait and stability. Monitor for mental status changes and reorient to person, place, and time. Review medications for side effects contributing to fall risk. Reinforce activity limits and safety measures with patient and family.

## 2021-10-04 NOTE — ED PROVIDER NOTE - CARE PROVIDER_API CALL
Jenae Hooper)  Hematology; Internal Medicine; Medical Oncology  [address] [place] [address]  Phone: [phone]  Fax: [phone]  Follow Up Time: 1-3 Days

## 2021-10-04 NOTE — ED PROVIDER NOTE - CLINICAL SUMMARY MEDICAL DECISION MAKING FREE TEXT BOX
71 yo male with PMH of HTN, CKD on HD M-W-F, Afib on Eliquis, DM, HLD, sleep apnea, anemia (gets Epogen weekly), bladder CA s/p scraping and BCG with no improvement, and hematuria now on and off for weeks presents to the ER for low H/H. States he's been feeling tired/generalized, gets SOB and winded easily x past week. No CP or palpitations. They did blood work on Friday and found HG was 6.6. WIfe called doctor today and found out H/H was low from blood draw a few days ago. Has had transfusion of PRBC in past (last was 8/21). No n/v/f/cough/abdomen pain/diarrhea/dysuria/LE edema or calf pain/HA/neck pain. Exam as per PA note. Labs, ekg, xray checked (evaluate for demand ischemia but EKG did not show this, pna, etc). Anemia confirmed. Pt T&S, and T&C done, ordered for 2 units PRBC here. EKG and Xray ok. If tolerates transfusion well, no complications s/p observation post transfusion to dc home.

## 2021-10-04 NOTE — ED PROVIDER NOTE - PHYSICAL EXAMINATION
CONSTITUTIONAL: Well-appearing; well-nourished; in no apparent distress.   EYES: PERRL; EOM intact.   ENT: normal nose; no rhinorrhea; normal pharynx with no tonsillar hypertrophy.   NECK: Supple; non-tender; no cervical lymphadenopathy.   CARDIOVASCULAR: Normal S1, S2; no murmurs, rubs, or gallops.   RESPIRATORY: Normal chest excursion with respiration; breath sounds clear and equal bilaterally; no wheezes, rhonchi, or rales.  GI/: Normal bowel sounds; non-distended; non-tender; no palpable organomegaly.   MS: No evidence of trauma or deformity. Normal ROM in all four extremities; non-tender to palpation; distal pulses are normal.   SKIN: + pallor  NEURO/PSYCH: A & O x 4; grossly unremarkable. mood and manner are appropriate.

## 2021-10-04 NOTE — ED PROVIDER NOTE - OBJECTIVE STATEMENT
70 year old M with hx of a fib in eliquis, anemia on weekly procrit, DM, HTN, bladder ca dx 02/21 s/p resection, ESRD on HD MWF since 09/21 presenting to ED with low hbg 5.5. Pt sts has had intermittent hematuria x past few weeks. + mult blood transfusions, last transfusion 08/21. + fatigue x 4 days and now c/o yu. No fever/chills, chest pain, LE swelling, bloody stools, nausea, vomiting, diarrhea.   renal: Dr. ponce moreno: [facility]

## 2021-10-04 NOTE — ED PROVIDER NOTE - PROGRESS NOTE DETAILS
Pt feels well, no fever/sob/rash. Pt to follow-up with Dr. Hooper. Return precautions provided. Pt agreeable to dDanielcDaniel

## 2021-10-04 NOTE — ED PROVIDER NOTE - ATTENDING CONTRIBUTION TO CARE
69 yo male with PMH of HTN, CKD on HD M-W-F, Afib on Eliquis, DM, HLD, sleep apnea, anemia (gets Epogen weekly), bladder CA s/p scraping and BCG with no improvement, and hematuria now on and off for weeks presents to the ER for low H/H. States he's been feeling tired/generalized, gets SOB and winded easily x past week. No CP or palpitations. They did blood work on Friday and found HG was 6.6. WIfe called doctor today and found out H/H was low from blood draw a few days ago. Has had transfusion of PRBC in past (last was 8/21). No n/v/f/cough/abdomen pain/diarrhea/dysuria/LE edema or calf pain/HA/neck pain. Exam as per PA note. Labs, ekg, xray checked (evaluate for demand ischemia but EKG did not show this, pna, etc). Anemia confirmed. Pt T&S, and T&C done, ordered for 2 units PRBC here. EKG and Xray ok. If tolerates transfusion well, no complications s/p observation post transfusion to dc home.     ALL: allopurinol, pravachol  SH quit smoking 30 yrs ago, no etoh  PMD Ceka,  Onc: Sandie Uroelisabeth Pate, Jasper Olivas

## 2021-10-04 NOTE — ED PROVIDER NOTE - NS ED ROS FT
Constitutional: + weakness/faitgue. no fever, chills,   Eyes: no redness/discharge/pain/vision changes  ENT: no rhinorrhea/ear pain/sore throat  Cardiac: No chest pain, SOB or edema.  Respiratory: No cough or respiratory distress  GI: No nausea, vomiting, diarrhea or abdominal pain.  : + hematuria. No dysuria, frequency, urgency  MS: no pain to back or extremities, no loss of ROM, no weakness  Neuro: No headache or weakness. No LOC.  Skin: No skin rash.  Endocrine: No history of thyroid disease or diabetes.  Except as documented in the HPI, all other systems are negative.

## 2021-10-04 NOTE — ED PROVIDER NOTE - PATIENT PORTAL LINK FT
You can access the FollowMyHealth Patient Portal offered by [facility] by registering at the following website: [email or web address]. By joining [employer]’s FollowMyHealth portal, you will also be able to view your health information using other applications (apps) compatible with our system.

## 2021-10-13 NOTE — REVIEW OF SYSTEMS
[Fatigue] : fatigue [Recent Change In Weight] : ~T recent weight change [Vision Problems] : vision problems [Lower Ext Edema] : lower extremity edema [SOB on Exertion] : shortness of breath during exertion [Joint Pain] : joint pain [Dizziness] : dizziness [Insomnia] : insomnia [Anxiety] : anxiety [Negative] : Endocrine [FreeTextEntry6] : Sleep apnea [FreeTextEntry7] : Nausea [FreeTextEntry8] : Hematuria

## 2021-10-13 NOTE — PHYSICAL EXAM
[Ambulatory and capable of all self care but unable to carry out any work activities] : Status 2- Ambulatory and capable of all self care but unable to carry out any work activities. Up and about more than 50% of waking hours [Normal] : grossly intact [de-identified] : Although still somewhat overweight [de-identified] : Conjunctival pallor [de-identified] : Although a very soft systolic murmur suspected [de-identified] : Peripheral edema reduced compared to previous [de-identified] : Arthritic changes noted [de-identified] : Looks a little down and anxious.

## 2021-10-13 NOTE — ASSESSMENT
[FreeTextEntry1] : 1. Anemia, macrocytic, multifactorial, including hematuria, kidney disease, and possibly early MDS. He may need further transfusions.\par 2. Hematuria, secondary to a bladder tumor, high grade, invasive now, followed by urologist, at least stage 1. Was treated previously with TURBT and intravesical BCG. Considered high risk for aggressive surgery. To continue follow up with urologist. May need repeat cystoscopy to stop hematuria.\par 3. Kidney disease, stage V, now on dialysis. \par 4. Thrombocytopenia, probably also multifactorial, including MDS, dilutional,\par \par The situation was discussed with the patient and his wife.\par We will repeat the CBC and probably plan for further transfusion.\par At this time, the patient should follow up with his urologist to address the issue of hematuria and R/O disease progression.\par From a hematological standpoint, eventually, the bone marrow studies need to be repeated since he is also developing thrombocytopenia although this may also be dilutional.\par \par All questions answered.\par \par The patient will have his CBC repeated in a few days and possibly undergo further transfusion within the next 2-3 days given his persistent hematuria.

## 2021-10-13 NOTE — HISTORY OF PRESENT ILLNESS
[de-identified] : The patient is coming for a follow up for his chronic anemia, multifactorial, gradually more macrocytic.\par He was recently, over the weekend, sent to the ER for PRBC transfusion, which was done and patient discharged.\par Unfortunately, he is still having significant hematuria from his bladder tumor.\par He is continuing with hemodialysis.\par He is still feeling weak, without energy although somewhat better following transfusion.\par He gets a little dizzy and nauseated after dialysis sessions.

## 2021-12-28 NOTE — ED ADULT NURSE NOTE - NSIMPLEMENTINTERV_GEN_ALL_ED
Implemented All Fall Risk Interventions:  [place] to call system. Call bell, personal items and telephone within reach. Instruct patient to call for assistance. Room bathroom lighting operational. Non-slip footwear when patient is off stretcher. Physically safe environment: no spills, clutter or unnecessary equipment. Stretcher in lowest position, wheels locked, appropriate side rails in place. Provide visual cue, wrist band, yellow gown, etc. Monitor gait and stability. Monitor for mental status changes and reorient to person, place, and time. Review medications for side effects contributing to fall risk. Reinforce activity limits and safety measures with patient and family.

## 2021-12-28 NOTE — ED PROVIDER NOTE - CARE PLAN
1 Principal Discharge DX:	Weakness  Secondary Diagnosis:	Hypoglycemia  Secondary Diagnosis:	Leukocytosis  Secondary Diagnosis:	ESRD on dialysis  Secondary Diagnosis:	Colitis

## 2021-12-28 NOTE — ED PROVIDER NOTE - OBJECTIVE STATEMENT
72 yo male, pmh of esrd on hd mwd, bladder ca on chemo/radiation last tx was last week, htn, hld, dm, afib on coumadin, presents to ed for hypoglycemia, per ems gave oral glucose, was called for weakness, mild, no specific pain or radiation. denies fever, chills, cp, sob, abd pain, nvd.

## 2021-12-28 NOTE — H&P ADULT - ASSESSMENT
72 yo M PMH ESRD on HD (Mon, Wed, Sat), Bladder Ca s/p chemo/radiation, Afib (no longer on AC), HLD, HTN, DM admitted for colitis     #Colitis   -CT A/P w/o contrast: Edema and wall thickening extending from the splenic flexure down to the sigmoid colon suspicious for colitis.   -WBC 19.27  -Cefepime, Flagyl  -ID consult  -Renal diet, low fiber    #ESRD on HD  #Missed Dialysis  #Anemia   -Nephrology consult (Dr. Olivas)  -Started dialysis in September. Missed yesterday's session   -HD likely tomorrow   -Anemia stable. Receives procrit 20 each dialysis   -K 4.8  -No indication for emergent renal replacement therapy  -Continue Bumex     #DM type II   #Episode of hypoglycemia (41)   -Hypoglycemia resolved  -On Glimepiride at home and Lantus nightly. Takes lantus as needed, different doses  -Will start insulin sliding scale     #Afib  -Was previously on Warfarin, then transitioned to Eliquis. Has not been on any anticoags or antiplatelets since November due to severe hematuria (pt also had thrombocytopenia 2/2 to chemotherapy, which has improved)     #Bladder CA  -Diagnosed in Feb 2021, s/p tumor resection  -Finished chemo and radiation 1-2 weeks ago  -Pt's radiation oncologist: Dr. 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 Perry

## 2021-12-28 NOTE — H&P ADULT - NSHPSOCIALHISTORY_GEN_ALL_CORE
Tobacco use: Quit >30 years ago  EtOH use: Denies  Illicit drug use: Denies  Marital Status: Lives with wife

## 2021-12-28 NOTE — ED PROVIDER NOTE - ATTENDING CONTRIBUTION TO CARE
71 yr old m w/ a pmh significant for afib (coumadin), gout, ckd, dm, htn, hld, víctor, ESRD (MWF) who presents with hypoglycemia. Pt states that since Monday he has been having diffuse abd pain and diarrhea (NB). Pt reports that he missed dialysis on Monday because of the symptoms. Today pt was noted to be weak and lethargic, fsg in the field was noted to be in the 40s. Pt given juice and D50 in the ED. Pt denies any other medical complaints.     VITAL SIGNS: I have reviewed nursing notes and confirm.  CONSTITUTIONAL: non-toxic, well appearing  SKIN: no rash, no petechiae.  EYES: EOMI, pink conjunctiva, anicteric  ENT: tongue midline, no exudates, MMM  NECK: Supple; no meningismus, no JVD  CARD: RRR, no murmurs, equal radial pulses bilaterally 2+  RESP: CTAB, no respiratory distress  ABD: Soft, diffuse abd tenderness, non-distended, no peritoneal signs, no HSM, no CVA tenderness  EXT: Normal ROM x4.   NEURO: Alert, oriented x3. CN2-12 intact, equal strength bilaterally, nl gait.  PSYCH: Cooperative, appropriate.    a/p  71 yr old m that presents with hypoglycemia  -labs  -ekg  -imaging  -reassess  -dispo pending

## 2021-12-28 NOTE — ED ADULT TRIAGE NOTE - CHIEF COMPLAINT QUOTE
BIBA as per EMS pt hypoglycemic at 41, po glucose was given sugar increased to 51. im glucagon 1mg was given.

## 2021-12-28 NOTE — H&P ADULT - NSICDXPASTMEDICALHX_GEN_ALL_CORE_FT
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

## 2021-12-28 NOTE — ED PROVIDER NOTE - PHYSICAL EXAMINATION
Physical Exam    Vital Signs: I have reviewed the initial vital signs.  Constitutional: well-nourished, appears stated age, no acute distress  Eyes: Conjunctiva pink, Sclera clear, .  Cardiovascular: S1 and S2, regular rate, regular rhythm, well-perfused extremities, radial pulses equal and 2+, pedal pulses 2+ and equal   Respiratory: unlabored respiratory effort, clear to auscultation bilaterally no wheezing, rales and rhonchi  Gastrointestinal: soft, non-tender abdomen, no pulsatile mass, normal bowl sounds  Musculoskeletal: supple neck, no lower extremity edema, no midline tenderness  Integumentary: warm, dry, no rash  Neurologic: awake, alert, nvi

## 2021-12-28 NOTE — H&P ADULT - ATTENDING COMMENTS
70 yo M PMH ESRD on HD (Mon, Wed, Sat), Bladder Ca s/p chemo/radiation, Afib (no longer on AC), HLD, HTN, DM admitted for hypoglycemia and colitis. FS noted to be around 47. Pt states he has been on glimepiride and lantus at home. He states his FS was around 280 yesterday evening, and gave himself 8U lantus. Will discontinue glimepiride. While inpatient, will continue only ISS for now given hypoglycemic episode today. Add on lantus if FS continues to be elevated in the morning.    He has been having diarrhea for 2 weeks after completing radiation therapy for bladder cancer. Pt states it has been somewhat improving since 1 day ago. Continue cefepime/flagyl, monitor WBC which was elevated to 19 on admission. ID consulted. Send for c.diff and GI PCR if pt continues to have diarrhea. f/u BCx, Ucx    Pt missed HD yesterday due to feeling deconditioned and weak. Follows Dr. Olivas for renal, will consult for HD.    #Progress Note Handoff  Pending (specify): Resolution of colitis, PT eval, monitoring FS, monitoring WBC  Family discussion: d/w pt regarding tx for colitis  Disposition: Home 72 yo M PMH ESRD on HD (Mon, Wed, Sat), Bladder Ca s/p chemo/radiation, Afib (no longer on AC), HLD, HTN, DM admitted for hypoglycemia and colitis. FS noted to be around 47. Pt states he has been on glimepiride and lantus at home. He states his FS was around 280 yesterday evening, and gave himself 8U lantus. Will discontinue glimepiride. While inpatient, will continue only ISS for now given hypoglycemic episode today. Add on lantus if FS continues to be elevated in the morning.    He has been having diarrhea for 2 weeks after completing radiation therapy for bladder cancer. Pt states it has been somewhat improving since yesterday. Continue cefepime/flagyl, monitor WBC which was elevated to 19 on admission. ID consulted. Will order C.diff and GI PCR. f/u BCx, Ucx    Pt missed HD yesterday due to feeling deconditioned and weak. Follows Dr. Olivas for renal, will consult for HD.    #Progress Note Handoff  Pending (specify): Resolution of colitis, PT eval, monitoring FS, monitoring WBC  Family discussion: d/w pt regarding tx for colitis  Disposition: Home

## 2021-12-28 NOTE — ED PROVIDER NOTE - EMPLOYMENT
- Normal postpartum exam  - Contraception: OCP (estrogen/progesterone)    - Depression Screen: Negative  - Feeding: Breastfeeding  - Psychosocial support: Reports adequate support from her  and parents  - Patient Education: "Fourth Trimester Project: [employer]"  - Cervical cancer screening Up to Date, next due 2023  - Follow up as needed 
- We discussed that clinical studies demonstrate conflicting results regarding the effects of combined oral contraceptives on breastfeeding  - We discussed that no consistent effects on infant growth or illness have been reported; We discussed that adverse health outcomes in infants exposed to COCs through breast milk have not been demonstrated; however, studies have been inadequately designed to determine whether a long term risk exists  - The patient feels adequately counseled regarding combined OCP use in lactation and feels comfortable with proceeding with the prescription 
N/A

## 2021-12-28 NOTE — H&P ADULT - NSHPPHYSICALEXAM_GEN_ALL_CORE
PHYSICAL EXAM:  Vital Signs Last 24 Hrs  T(F): 96.5 (12-28-21 @ 17:06), Max: 96.6 (12-28-21 @ 12:13)  HR: 83 (12-28-21 @ 17:06) (74 - 83)  BP: 136/83 (12-28-21 @ 17:06)  RR: 19 (12-28-21 @ 17:06) (18 - 20)  SpO2: 98% (12-28-21 @ 17:06) (98% - 98%)    Constitutional: NAD, A&O x3. Non-diaphoretic, non-toxic appearing  Eyes: PERRLA. EOMI. Sclera white.   Respiratory: +air entry, no rales, no rhonchi, no wheezes  Cardiovascular: +S1 and S2, regular rate and rhythm. No murmurs, rubs, gallops.  Gastrointestinal: +BS, soft, non-tender, not distended  Extremities:  no edema, no calf tenderness  Vascular: +dorsal pedis and radial pulses, no extremity cyanosis  Neurological: sensation intact, ROM equal B/L, CN II-XII intact  Skin: no rashes, normal turgor

## 2021-12-28 NOTE — H&P ADULT - HISTORY OF PRESENT ILLNESS
Pt is a 72 yo M PMH ESRD on HD (Mon, Wed, Sat), Bladder Ca s/p chemo/radiation (Finished tx last week), Afib (no longer on AC), HLD, HTN, DM BIBEMS for hypoglycemia (41). Pt's wife reports patient was confused therefore she called 911. Fingerstick was 41 and was given oral glucose. Pt reports he takes Lantus only if needed. Sugar was 240s last night and he took 8 units, however sometimes he doesn't take any lantus.     Pt also reports 2 week hx of abdominal pain and diarrhea. Was prescribed unknown medications by radiation oncologist with minimal relief. CT Abdomen done in the ER- significant for colitis.     Pt also reports he was due for dialysis yesterday however didn't go because he was too weak and because he had diarrhea     Denies fever/chills (although wife noticed diaphoresis), melena/hematochezia, CP, SOB, vomiting.

## 2021-12-28 NOTE — ED PROVIDER NOTE - NS ED ROS FT
Constitutional: (-) fever, (-) chills (+) weakness  Eyes: (-) visual changes  ENT: (-) nasal congestions  Cardiovascular: (-) chest pain, (-) syncope  Respiratory: (-) cough, (-) shortness of breath, (-) dyspnea,   Gastrointestinal: (-) vomiting, (-) diarrhea, (-)nausea,  Musculoskeletal: (-) neck pain, (-) back pain, (-) joint pain,  Integumentary: (-) rash, (-) edema, (-) bruises  Neurological: (-) headache, (-) loc, (-) dizziness, (-) tingling, (-)numbness,  Peripheral Vascular: (-) leg swelling  :  (-)dysuria,  (-) hematuria  Allergic/Immunologic: (-) pruritus

## 2021-12-28 NOTE — ED ADULT NURSE NOTE - DOES PATIENT HAVE ADVANCE DIRECTIVE
Informed mom form ready for p/u at [facility]. Copy sent to be scanned. Yes

## 2021-12-28 NOTE — H&P ADULT - NSHPLABSRESULTS_GEN_ALL_CORE
7.8    19.27 )-----------( 185      ( 28 Dec 2021 12:45 )             25.7       12-28    141  |  102  |  79<HH>  ----------------------------<  85  4.8   |  20  |  8.0<HH>    Ca    8.2<L>      28 Dec 2021 12:45    TPro  6.0  /  Alb  2.8<L>  /  TBili  0.6  /  DBili  x   /  AST  26  /  ALT  16  /  AlkPhos  192<H>  12-28            PT/INR - ( 28 Dec 2021 12:45 )   PT: 14.90 sec;   INR: 1.30 ratio         PTT - ( 28 Dec 2021 12:45 )  PTT:24.4 sec    Lactate Trend  12-28 @ 12:45 Lactate:1.9       CAPILLARY BLOOD GLUCOSE      POCT Blood Glucose.: 234 mg/dL (28 Dec 2021 16:13)    < from: CT Abdomen and Pelvis No Cont (12.28.21 @ 15:10) >    Edema and wall thickening extending from the splenic flexure down to the   sigmoid colon suspicious for colitis.    Mild left posterior lateral bladder wall thickening at the site of the   prior polypoid bladder mass.    Moderate to large stool burden in the right hemicolon.    Small right pleural effusion, decreased since 9/23/2021    < end of copied text >    < from: Xray Chest 1 View-PORTABLE IMMEDIATE (12.28.21 @ 13:30) >    Right basilar opacity. Stable cardiomegaly    < end of copied text >

## 2021-12-29 NOTE — PROGRESS NOTE ADULT - ASSESSMENT
70 yo M PMH ESRD on HD (Mon, Wed, Sat), Bladder Ca s/p chemo/radiation, Afib (no longer on AC), HLD, HTN, DM admitted for colitis     #Colitis   -CT A/P w/o contrast: Edema and wall thickening extending from the splenic flexure down to the sigmoid colon suspicious for colitis.   -WBC 19.27  -continue Cefepime, Flagyl  -ID consult appreciated  -Renal diet, low fiber  -Follow GI PCR  -C diff neg    #ESRD on HD  #Missed Dialysis  #Anemia   -Nephrology consult (Dr. Olivas)  -Started dialysis in September. Missed yesterday's session   -HD as per schedule  -Anemia stable. Receives procrit 20 each dialysis   -No indication for emergent renal replacement therapy  -Continue Bumex     #DM type II   #Episode of hypoglycemia (41)   -Hypoglycemia resolved  -On Glimepiride at home and Lantus nightly. Takes lantus as needed, different doses  -Will start insulin sliding scale     #Afib  -Was previously on Warfarin, then transitioned to Eliquis. Has not been on any anticoags or antiplatelets since November due to severe hematuria (pt also had thrombocytopenia 2/2 to chemotherapy, which has improved)     #Bladder CA  -Diagnosed in Feb 2021, s/p tumor resection  -Finished chemo and radiation 1-2 weeks ago  -Pt's radiation oncologist: Dr. Srinivasan (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

## 2021-12-29 NOTE — PROGRESS NOTE ADULT - SUBJECTIVE AND OBJECTIVE BOX
72 yo M PMH ESRD on HD (Mon, Wed, Sat), Bladder Ca s/p chemo/radiation, Afib (no longer on AC), HLD, HTN, DM admitted for colitis.    Today:  Seen at bedside, no new complaints.          REVIEW OF SYSTEMS:  No new complaints.      MEDICATIONS  (STANDING):  buMETAnide 1 milliGRAM(s) Oral daily  cefepime   IVPB 500 milliGRAM(s) IV Intermittent every 24 hours  dextrose 40% Gel 15 Gram(s) Oral once  dextrose 5%. 1000 milliLiter(s) (50 mL/Hr) IV Continuous <Continuous>  dextrose 5%. 1000 milliLiter(s) (100 mL/Hr) IV Continuous <Continuous>  dextrose 50% Injectable 25 Gram(s) IV Push once  dextrose 50% Injectable 12.5 Gram(s) IV Push once  dextrose 50% Injectable 25 Gram(s) IV Push once  epoetin tomas-epbx (RETACRIT) Injectable 55518 Unit(s) IV Push <User Schedule>  glucagon  Injectable 1 milliGRAM(s) IntraMuscular once  heparin   Injectable 5000 Unit(s) SubCutaneous every 12 hours  insulin lispro (ADMELOG) corrective regimen sliding scale   SubCutaneous three times a day before meals  iron sucrose Injectable 100 milliGRAM(s) IV Push <User Schedule>  metoprolol succinate ER 50 milliGRAM(s) Oral daily  metroNIDAZOLE  IVPB 500 milliGRAM(s) IV Intermittent every 8 hours  metroNIDAZOLE  IVPB        MEDICATIONS  (PRN):  acetaminophen     Tablet .. 650 milliGRAM(s) Oral every 6 hours PRN Temp greater or equal to 38C (100.4F), Mild Pain (1 - 3)  aluminum hydroxide/magnesium hydroxide/simethicone Suspension 30 milliLiter(s) Oral every 4 hours PRN Dyspepsia  melatonin 3 milliGRAM(s) Oral at bedtime PRN Insomnia  ondansetron Injectable 4 milliGRAM(s) IV Push every 8 hours PRN Nausea and/or Vomiting      Allergies  allopurinol (Hobbs-Russ)  Pravachol (Muscle Pain)        FAMILY HISTORY:  FH: lung cancer        Vital Signs Last 24 Hrs  T(C): 36.1 (29 Dec 2021 04:38), Max: 36.1 (29 Dec 2021 04:38)  T(F): 96.9 (29 Dec 2021 04:38), Max: 96.9 (29 Dec 2021 04:38)  HR: 76 (29 Dec 2021 13:00) (74 - 83)  BP: 115/55 (29 Dec 2021 13:00) (112/57 - 150/68)  RR: 17 (29 Dec 2021 13:00) (16 - 19)  SpO2: 98% (28 Dec 2021 17:06) (98% - 98%)    PHYSICAL EXAM:  GENERAL: NAD, well-groomed, well-developed  HEAD:  Atraumatic, Normocephalic  EYES: EOMI, PERRLA, conjunctiva and sclera clear  ENMT: No tonsillar erythema, exudates, or enlargement; Moist mucous membranes, Good dentition, No lesions  NECK: Supple, No JVD, Normal thyroid  NERVOUS SYSTEM:  Alert & Oriented X3, Good concentration  CHEST/LUNG: Clear to percussion bilaterally; No rales, rhonchi, wheezing, or rubs  HEART: Regular rate and rhythm; No murmurs, rubs, or gallops  ABDOMEN: Soft, Nontender, Nondistended; Bowel sounds present        LABS:                        7.4    14.85 )-----------( 171      ( 29 Dec 2021 06:16 )             23.9     12-29    138  |  102  |  88<HH>  ----------------------------<  57<L>  4.7   |  19  |  8.6<HH>    Ca    7.8<L>      29 Dec 2021 06:16    TPro  5.4<L>  /  Alb  2.8<L>  /  TBili  0.5  /  DBili  x   /  AST  24  /  ALT  16  /  AlkPhos  213<H>  12-29    PT/INR - ( 28 Dec 2021 12:45 )   PT: 14.90 sec;   INR: 1.30 ratio         PTT - ( 28 Dec 2021 12:45 )  PTT:24.4 sec

## 2021-12-29 NOTE — PROGRESS NOTE ADULT - SUBJECTIVE AND OBJECTIVE BOX
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    Former smoker    Hypercholesterolemia    S/P rotator cuff repair  left    Cataract extraction status of eye, right  left eye cataract    History of surgery  TURBT    H/O detached retina repair  b/l eyes    H/O cystoscopy    History of colonoscopy  2016      Allergies:  allopurinol (Hobbs-Russ)  Pravachol (Muscle Pain)    Home Medications Reviewed    SOCIAL HISTORY:  Denies ETOH,Smoking,   FAMILY HISTORY:  FH: lung cancer          REVIEW OF SYSTEMS:  CONSTITUTIONAL: + weakness, no fevers or chills  EYES/ENT: No visual changes;  No vertigo or throat pain   NECK: No pain or stiffness  RESPIRATORY: No cough, wheezing, hemoptysis; No shortness of breath  CARDIOVASCULAR: No chest pain or palpitations.  GASTROINTESTINAL: No abdominal or epigastric pain. No nausea, vomiting, or hematemesis; + diarrhea or constipation. No melena or hematochezia.  GENITOURINARY: No dysuria, frequency, foamy urine, urinary urgency, incontinence or hematuria  NEUROLOGICAL: confusion earlier  SKIN: No itching, burning, rashes, or lesions   VASCULAR: improved bilateral lower extremity edema.   All other review of systems is negative unless indicated above.    PHYSICAL EXAM:  Constitutional: NAD  HEENT: anicteric sclera, oropharynx clear, MMM  Neck: No JVD  Respiratory: CTAB, no wheezes, rales or rhonchi  Cardiovascular: S1, S2, RRR  Gastrointestinal: BS+, soft, NT/ND  Extremities: No cyanosis or clubbing. No peripheral edema + CVI changes  Neurological: A/O x 3, no focal deficits  Psychiatric: Normal mood, normal affect  : No CVA tenderness. No wiggins.   Skin: No rashes  Vascular Access: + TDC W    Hospital Medications:   MEDICATIONS  (STANDING):  buMETAnide 1 milliGRAM(s) Oral daily  cefepime   IVPB 500 milliGRAM(s) IV Intermittent every 24 hours  dextrose 40% Gel 15 Gram(s) Oral once  dextrose 5%. 1000 milliLiter(s) (50 mL/Hr) IV Continuous <Continuous>  dextrose 5%. 1000 milliLiter(s) (100 mL/Hr) IV Continuous <Continuous>  dextrose 50% Injectable 25 Gram(s) IV Push once  dextrose 50% Injectable 12.5 Gram(s) IV Push once  dextrose 50% Injectable 25 Gram(s) IV Push once  epoetin tomas-epbx (RETACRIT) Injectable 58677 Unit(s) IV Push <User Schedule>  glucagon  Injectable 1 milliGRAM(s) IntraMuscular once  heparin   Injectable 5000 Unit(s) SubCutaneous every 12 hours  insulin lispro (ADMELOG) corrective regimen sliding scale   SubCutaneous three times a day before meals  iron sucrose Injectable 100 milliGRAM(s) IV Push <User Schedule>  metoprolol succinate ER 50 milliGRAM(s) Oral daily  metroNIDAZOLE  IVPB 500 milliGRAM(s) IV Intermittent every 8 hours  metroNIDAZOLE  IVPB            VITALS:  T(F): 96.7 (12-29-21 @ 14:32), Max: 96.9 (12-29-21 @ 04:38)  HR: 79 (12-29-21 @ 14:32)  BP: 130/64 (12-29-21 @ 14:32)  RR: 17 (12-29-21 @ 14:32)  SpO2: 98% (12-28-21 @ 17:06)  Wt(kg): --    12-29 @ 07:01  -  12-29 @ 16:00  --------------------------------------------------------  IN: 0 mL / OUT: 1000 mL / NET: -1000 mL      Height (cm): 180.3 (12-28 @ 17:35)  Weight (kg): 86.3 (12-28 @ 17:35)  BMI (kg/m2): 26.5 (12-28 @ 17:35)  BSA (m2): 2.06 (12-28 @ 17:35)    LABS:  12-29    138  |  102  |  88<HH>  ----------------------------<  57<L>  4.7   |  19  |  8.6<HH>    Ca    7.8<L>      29 Dec 2021 06:16    TPro  5.4<L>  /  Alb  2.8<L>  /  TBili  0.5  /  DBili      /  AST  24  /  ALT  16  /  AlkPhos  213<H>  12-29                          7.4    14.85 )-----------( 171      ( 29 Dec 2021 06:16 )             23.9       Urine Studies:        RADIOLOGY & ADDITIONAL STUDIES:   NEPHROLOGY CONSULTATION NOTE      70 yo M PMH ESRD on HD (Mon, Wed, Sat), Bladder Ca s/p chemo/radiation (Finished tx last week), Afib (no longer on AC), HLD, HTN, DM BIBEMS for hypoglycemia (41), gout, LUPE, detached retina. Pt's wife reports patient was confused therefore she called 911. Fingerstick was 41 and was given oral glucose. Pt reports he takes Lantus only if needed. Sugar was 240s last night and he took 8 units, however sometimes he doesn't take any lantus.     Pt also reports 2 week hx of abdominal pain and diarrhea. Was prescribed unknown medications by radiation oncologist with minimal relief. CT Abdomen done in the ER- significant for colitis.     Pt also reports he was due for dialysis yesterday however didn't go because he was too weak and because he had diarrhea     Denies fever/chills (although wife noticed diaphoresis), melena/hematochezia, CP, SOB, vomiting.         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    Former smoker    Hypercholesterolemia    S/P rotator cuff repair  left    Cataract extraction status of eye, right  left eye cataract    History of surgery  TURBT    H/O detached retina repair  b/l eyes    H/O cystoscopy    History of colonoscopy  2016      Allergies:  allopurinol (Hobbs-Russ)  Pravachol (Muscle Pain)    Home Medications Reviewed    SOCIAL HISTORY:  Denies ETOH,Smoking,   FAMILY HISTORY:  FH: lung cancer          REVIEW OF SYSTEMS:  CONSTITUTIONAL: + weakness, no fevers or chills  EYES/ENT: No visual changes;  No vertigo or throat pain   NECK: No pain or stiffness  RESPIRATORY: No cough, wheezing, hemoptysis; No shortness of breath  CARDIOVASCULAR: No chest pain or palpitations.  GASTROINTESTINAL: No abdominal or epigastric pain. No nausea, vomiting, or hematemesis; + diarrhea or constipation. No melena or hematochezia.  GENITOURINARY: No dysuria, frequency, foamy urine, urinary urgency, incontinence or hematuria  NEUROLOGICAL: confusion earlier  SKIN: No itching, burning, rashes, or lesions   VASCULAR: improved bilateral lower extremity edema.   All other review of systems is negative unless indicated above.    PHYSICAL EXAM:  Constitutional: NAD  HEENT: anicteric sclera, oropharynx clear, MMM  Neck: No JVD  Respiratory: CTAB, no wheezes, rales or rhonchi  Cardiovascular: S1, S2, RRR  Gastrointestinal: BS+, soft, NT/ND  Extremities: No cyanosis or clubbing. No peripheral edema + CVI changes  Neurological: A/O x 3, no focal deficits  Psychiatric: Normal mood, normal affect  : No CVA tenderness. No wiggins.   Skin: No rashes  Vascular Access: + TDC [facility] Medications:   MEDICATIONS  (STANDING):  buMETAnide 1 milliGRAM(s) Oral daily  cefepime   IVPB 500 milliGRAM(s) IV Intermittent every 24 hours  dextrose 40% Gel 15 Gram(s) Oral once  dextrose 5%. 1000 milliLiter(s) (50 mL/Hr) IV Continuous <Continuous>  dextrose 5%. 1000 milliLiter(s) (100 mL/Hr) IV Continuous <Continuous>  dextrose 50% Injectable 25 Gram(s) IV Push once  dextrose 50% Injectable 12.5 Gram(s) IV Push once  dextrose 50% Injectable 25 Gram(s) IV Push once  epoetin tomas-epbx (RETACRIT) Injectable 81220 Unit(s) IV Push <User Schedule>  glucagon  Injectable 1 milliGRAM(s) IntraMuscular once  heparin   Injectable 5000 Unit(s) SubCutaneous every 12 hours  insulin lispro (ADMELOG) corrective regimen sliding scale   SubCutaneous three times a day before meals  iron sucrose Injectable 100 milliGRAM(s) IV Push <User Schedule>  metoprolol succinate ER 50 milliGRAM(s) Oral daily  metroNIDAZOLE  IVPB 500 milliGRAM(s) IV Intermittent every 8 hours  metroNIDAZOLE  IVPB            VITALS:  T(F): 96.7 (12-29-21 @ 14:32), Max: 96.9 (12-29-21 @ 04:38)  HR: 79 (12-29-21 @ 14:32)  BP: 130/64 (12-29-21 @ 14:32)  RR: 17 (12-29-21 @ 14:32)  SpO2: 98% (12-28-21 @ 17:06)  Wt(kg): --    12-29 @ 07:01  -  12-29 @ 16:00  --------------------------------------------------------  IN: 0 mL / OUT: 1000 mL / NET: -1000 mL      Height (cm): 180.3 (12-28 @ 17:35)  Weight (kg): 86.3 (12-28 @ 17:35)  BMI (kg/m2): 26.5 (12-28 @ 17:35)  BSA (m2): 2.06 (12-28 @ 17:35)    LABS:  12-29    138  |  102  |  88<HH>  ----------------------------<  57<L>  4.7   |  19  |  8.6<HH>    Ca    7.8<L>      29 Dec 2021 06:16    TPro  5.4<L>  /  Alb  2.8<L>  /  TBili  0.5  /  DBili      /  AST  24  /  ALT  16  /  AlkPhos  213<H>  12-29                          7.4    14.85 )-----------( 171      ( 29 Dec 2021 06:16 )             23.9       Urine Studies:        RADIOLOGY & ADDITIONAL STUDIES:

## 2021-12-29 NOTE — PROGRESS NOTE ADULT - ASSESSMENT
ESRD on HD    - MWF @ [facility]   - access via [facility] TDC   - missed HD on Monday  diarrhea / colitis / cdiff negative  bladder ca s/p recent XRT and chemotherapy  hypoglycemia improved  anemia  DM2  HTN    plan:    HD today  epogen / venofer with HD  PRN PRBC transfusion  cont metoprolol and bumex  abx per ID, f/u cultures and stool PCR  hem / onc at [facility]  keep off sulfonylurea, monitor BS        addendum:  HD today.  UF 1kg off only.  d/w HD nursing

## 2021-12-29 NOTE — CONSULT NOTE ADULT - ASSESSMENT
ASSESSMENT  71y M admitted with WEAKNESS    HPI:  Pt is a 72 yo M PMH ESRD on HD (Mon, Wed, Sat), Bladder Ca s/p chemo/radiation (Finished tx last week), Afib (no longer on AC), HLD, HTN, DM BIBEMS for hypoglycemia (41), gout, LUPE, detached retina. Pt's wife reports patient was confused therefore she called 911. Fingerstick was 41 and was given oral glucose. Pt reports he takes Lantus only if needed. Sugar was 240s last night and he took 8 units, however sometimes he doesn't take any lantus. Pt also reports 2 week hx of abdominal pain and diarrhea. Was prescribed unknown medications by radiation oncologist with minimal relief. CT Abdomen done in the ER- significant for colitis. Pt also reports he was due for dialysis yesterday however didn't go because he was too weak and because he had diarrhea. Denies fever/chills (although wife noticed diaphoresis), melena/hematochezia, CP, SOB, vomiting.     PROBLEMS  Colitis R/O radiation induced vs infectious  C difficile toxin PCR negative  CT with edema and wall thickening extending from the splenic flexure down to the sigmoid colon suspicious for colitis. Bladder mass    New problem with additional W/U  acute illness with systemic symptoms    On cefepime   IVPB 500 milliGRAM(s) IV Intermittent every 24 hours  metroNIDAZOLE  IVPB 500 milliGRAM(s) IV Intermittent every 8 hours    Hypoglycemia & confusion  SIRS (T<96.8F, WBC>12). R/O gram negative pneumonia due to DM (Pt with RLL opacity on Cxray)  Pleural effusion on CT    PLAN  - Stool GI PCR  - Await blood cultures, urine culture  - Continue Flagyl & Cefepime for now  - Nasal MRSA PCR  - Repeat wbc  - Await renal consult

## 2021-12-30 NOTE — DISCHARGE NOTE NURSING/CASE MANAGEMENT/SOCIAL WORK - NSDCVIVACCINE_GEN_ALL_CORE_FT
BCG; 26-May-2021 16:22; Karley Madsen (RN); IntraVesical.; 50 milliGRAM(s); VIS (VIS Presented: 26-May-2021);   BCG; 02-Jun-2021 16:53; Karley Madsen (RN); IntraVesical.; 50 milliGRAM(s); VIS (VIS Presented: 02-Jun-2021);   BCG; 09-Jun-2021 18:08; Karley Madsen (RN); IntraVesical.; 50 milliGRAM(s); VIS (VIS Presented: 09-Jun-2021);   BCG; 16-Jun-2021 15:45; Karley Madsen (RN); IntraVesical.; 50 milliGRAM(s); VIS (VIS Presented: 16-Jun-2021);   BCG; 23-Jun-2021 15:25; Karley Madsen (RN); IntraVesical.; 50 milliGRAM(s); VIS (VIS Presented: 23-Jun-2021);   BCG; 30-Jun-2021 17:12; Karley Madsen (RN); IntraVesical.; 50 milliGRAM(s); VIS (VIS Presented: 30-Jun-2021);

## 2021-12-30 NOTE — DISCHARGE NOTE PROVIDER - NSDCFUSCHEDAPPT_GEN_ALL_CORE_FT
MESHA ZALDIVAR ; 01/06/2022 ; [facility] [address] ; 01/10/2022 ; [facility] [address]

## 2021-12-30 NOTE — DISCHARGE NOTE PROVIDER - HOSPITAL COURSE
72 yo M PMH ESRD on HD (Mon, Wed, Sat), Bladder Ca s/p chemo/radiation, Afib (no longer on AC), HLD, HTN, DM admitted for colitis.     #Colitis   -CT A/P w/o contrast: Edema and wall thickening extending from the splenic flexure down to the sigmoid colon suspicious for colitis.   -WBC 19.27-->  -GI PCR neg  -C diff neg  -Afebrile  -Tolerating diet with normal BM today  -LA home with outpatient follow up.

## 2021-12-30 NOTE — PROGRESS NOTE ADULT - ASSESSMENT
ESRD on HD    - MWF @ [facility]   - access via [facility] TDC   - last HD Wed  diarrhea / colitis / cdiff negative + stool PCR negative   - resolved  bladder ca s/p recent XRT and chemotherapy  hypoglycemia improved off sulfonyurea   anemia  DM2  HTN    plan:    next HD tomorrow inpatient or at [facility]   epogen with HD  cont metoprolol and bumex  abx per ID, complete PO or dc altogether?  keep off sulfonylurea   f/u CBC today, PRN PRBC transfusion   pt wants to go home, stable for dc from renal standpoint if today's hgb stable  full code

## 2021-12-30 NOTE — DISCHARGE NOTE PROVIDER - NSDCMRMEDTOKEN_GEN_ALL_CORE_FT
bumetanide 1 mg oral tablet: 1 tab(s) orally once a day  glimepiride 1 mg oral tablet: 1 tab(s) orally once a day  Lantus: 18 unit(s) subcutaneous 2 times a day  metoprolol succinate 50 mg oral tablet, extended release: 1 tab(s) orally once a day

## 2021-12-30 NOTE — DISCHARGE NOTE NURSING/CASE MANAGEMENT/SOCIAL WORK - NSDCPEFALRISK_GEN_ALL_CORE
For information on Fall & Injury Prevention, visit: [email or web address] OR  [email or web address] OR  https://www.cdc.gov/steadi/patient.html

## 2021-12-30 NOTE — DISCHARGE NOTE PROVIDER - CARE PROVIDER_API CALL
Santo Pichardo (MD)  Medicine  [address] [place] [address]  Phone: [phone]  Fax: [phone]  Follow Up Time:

## 2021-12-30 NOTE — DISCHARGE NOTE PROVIDER - NSDCCPCAREPLAN_GEN_ALL_CORE_FT
PRINCIPAL DISCHARGE DIAGNOSIS  Diagnosis: Colitis  Assessment and Plan of Treatment: Likely a viral infection or secondary to your recent radiation.  Please follow with your primary care doctor within 1 week.

## 2021-12-30 NOTE — DISCHARGE NOTE PROVIDER - NSDCHC_MEDRECSTATUS_GEN_ALL_CORE
Admission Reconciliation is Completed  Discharge Reconciliation is Completed X Size Of Lesion In Cm (Optional): 1.3

## 2021-12-30 NOTE — DISCHARGE NOTE NURSING/CASE MANAGEMENT/SOCIAL WORK - PATIENT PORTAL LINK FT
You can access the FollowMyHealth Patient Portal offered by [facility] by registering at the following website: [email or web address]. By joining [email or web address]’s FollowMyHealth portal, you will also be able to view your health information using other applications (apps) compatible with our system.

## 2021-12-30 NOTE — PROGRESS NOTE ADULT - SUBJECTIVE AND OBJECTIVE BOX
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    Former smoker    Hypercholesterolemia    S/P rotator cuff repair  left    Cataract extraction status of eye, right  left eye cataract    History of surgery  TURBT    H/O detached retina repair  b/l eyes    H/O cystoscopy    History of colonoscopy  2016      Allergies:  allopurinol (Hobbs-Russ)  Pravachol (Muscle Pain)    Home Medications Reviewed    SOCIAL HISTORY:  Denies ETOH,Smoking,   FAMILY HISTORY:  FH: lung cancer          REVIEW OF SYSTEMS:  CONSTITUTIONAL: + weakness, no fevers or chills  EYES/ENT: No visual changes;  No vertigo or throat pain   NECK: No pain or stiffness  RESPIRATORY: No cough, wheezing, hemoptysis; No shortness of breath  CARDIOVASCULAR: No chest pain or palpitations.  GASTROINTESTINAL: No abdominal or epigastric pain. No nausea, vomiting, or hematemesis; + diarrhea or constipation. No melena or hematochezia.  GENITOURINARY: No dysuria, frequency, foamy urine, urinary urgency, incontinence or hematuria  NEUROLOGICAL: confusion earlier  SKIN: No itching, burning, rashes, or lesions   VASCULAR: improved bilateral lower extremity edema.   All other review of systems is negative unless indicated above.    advance care planning and directives reviewed     PHYSICAL EXAM:  Constitutional: NAD  HEENT: anicteric sclera, oropharynx clear, MMM  Neck: No JVD  Respiratory: CTAB, no wheezes, rales or rhonchi  Cardiovascular: S1, S2, RRR  Gastrointestinal: BS+, soft, NT/ND  Extremities: No cyanosis or clubbing. No peripheral edema + CVI changes  Neurological: A/O x 3, no focal deficits  Psychiatric: Normal mood, normal affect  : No CVA tenderness. No wiggins.   Skin: No rashes  Vascular Access: + TDC [facility]    Hospital Medications:   MEDICATIONS  (STANDING):  buMETAnide 1 milliGRAM(s) Oral daily  cefepime   IVPB 500 milliGRAM(s) IV Intermittent every 24 hours  dextrose 40% Gel 15 Gram(s) Oral once  dextrose 5%. 1000 milliLiter(s) (50 mL/Hr) IV Continuous <Continuous>  dextrose 5%. 1000 milliLiter(s) (100 mL/Hr) IV Continuous <Continuous>  dextrose 50% Injectable 25 Gram(s) IV Push once  dextrose 50% Injectable 12.5 Gram(s) IV Push once  dextrose 50% Injectable 25 Gram(s) IV Push once  epoetin tomas-epbx (RETACRIT) Injectable 16361 Unit(s) IV Push <User Schedule>  glucagon  Injectable 1 milliGRAM(s) IntraMuscular once  heparin   Injectable 5000 Unit(s) SubCutaneous every 12 hours  insulin lispro (ADMELOG) corrective regimen sliding scale   SubCutaneous three times a day before meals  iron sucrose Injectable 100 milliGRAM(s) IV Push <User Schedule>  metoprolol succinate ER 50 milliGRAM(s) Oral daily  metroNIDAZOLE  IVPB 500 milliGRAM(s) IV Intermittent every 8 hours  metroNIDAZOLE  IVPB            VITALS:  T(F): 96.8 (12-30-21 @ 05:18), Max: 98.2 (12-29-21 @ 21:00)  HR: 90 (12-30-21 @ 05:18)  BP: 133/88 (12-30-21 @ 05:18)  RR: 16 (12-30-21 @ 05:18)  SpO2: --  Wt(kg): --    12-29 @ 07:01  -  12-30 @ 07:00  --------------------------------------------------------  IN: 0 mL / OUT: 1000 mL / NET: -1000 mL          LABS:  12-29    138  |  102  |  88<HH>  ----------------------------<  57<L>  4.7   |  19  |  8.6<HH>    Ca    7.8<L>      29 Dec 2021 06:16    TPro  5.4<L>  /  Alb  2.8<L>  /  TBili  0.5  /  DBili      /  AST  24  /  ALT  16  /  AlkPhos  213<H>  12-29                          7.4    14.85 )-----------( 171      ( 29 Dec 2021 06:16 )             23.9       Urine Studies:        RADIOLOGY & ADDITIONAL STUDIES:

## 2022-01-01 ENCOUNTER — LABORATORY RESULT (OUTPATIENT)
Age: 72
End: 2022-01-01

## 2022-01-01 ENCOUNTER — APPOINTMENT (OUTPATIENT)
Dept: INFUSION THERAPY | Facility: CLINIC | Age: 72
End: 2022-01-01

## 2022-01-01 ENCOUNTER — APPOINTMENT (OUTPATIENT)
Dept: HEMATOLOGY ONCOLOGY | Facility: CLINIC | Age: 72
End: 2022-01-01
Payer: MEDICARE

## 2022-01-01 ENCOUNTER — NON-APPOINTMENT (OUTPATIENT)
Age: 72
End: 2022-01-01

## 2022-01-01 ENCOUNTER — APPOINTMENT (OUTPATIENT)
Dept: HEMATOLOGY ONCOLOGY | Facility: CLINIC | Age: 72
End: 2022-01-01

## 2022-01-01 ENCOUNTER — OUTPATIENT (OUTPATIENT)
Dept: OUTPATIENT SERVICES | Facility: HOSPITAL | Age: 72
LOS: 1 days | Discharge: HOME | End: 2022-01-01
Payer: MEDICARE

## 2022-01-01 ENCOUNTER — OUTPATIENT (OUTPATIENT)
Dept: OUTPATIENT SERVICES | Facility: HOSPITAL | Age: 72
LOS: 1 days | Discharge: HOME | End: 2022-01-01

## 2022-01-01 ENCOUNTER — INPATIENT (INPATIENT)
Facility: HOSPITAL | Age: 72
LOS: 0 days | End: 2022-06-22
Attending: INTERNAL MEDICINE | Admitting: INTERNAL MEDICINE
Payer: MEDICARE

## 2022-01-01 ENCOUNTER — APPOINTMENT (OUTPATIENT)
Dept: ENDOCRINOLOGY | Facility: CLINIC | Age: 72
End: 2022-01-01
Payer: MEDICARE

## 2022-01-01 ENCOUNTER — RESULT REVIEW (OUTPATIENT)
Age: 72
End: 2022-01-01

## 2022-01-01 ENCOUNTER — APPOINTMENT (OUTPATIENT)
Dept: ENDOCRINOLOGY | Facility: CLINIC | Age: 72
End: 2022-01-01

## 2022-01-01 VITALS
BODY MASS INDEX: 28.14 KG/M2 | TEMPERATURE: 98.6 F | HEART RATE: 90 BPM | RESPIRATION RATE: 14 BRPM | SYSTOLIC BLOOD PRESSURE: 126 MMHG | DIASTOLIC BLOOD PRESSURE: 60 MMHG | HEIGHT: 69 IN | WEIGHT: 190 LBS

## 2022-01-01 VITALS
HEART RATE: 90 BPM | BODY MASS INDEX: 27.55 KG/M2 | DIASTOLIC BLOOD PRESSURE: 65 MMHG | TEMPERATURE: 97.9 F | HEIGHT: 69 IN | RESPIRATION RATE: 14 BRPM | WEIGHT: 186 LBS | SYSTOLIC BLOOD PRESSURE: 127 MMHG

## 2022-01-01 VITALS — SYSTOLIC BLOOD PRESSURE: 173 MMHG | HEART RATE: 140 BPM | DIASTOLIC BLOOD PRESSURE: 86 MMHG | OXYGEN SATURATION: 100 %

## 2022-01-01 VITALS — OXYGEN SATURATION: 76 % | RESPIRATION RATE: 14 BRPM

## 2022-01-01 DIAGNOSIS — Z98.890 OTHER SPECIFIED POSTPROCEDURAL STATES: Chronic | ICD-10-CM

## 2022-01-01 DIAGNOSIS — C67.9 MALIGNANT NEOPLASM OF BLADDER, UNSPECIFIED: ICD-10-CM

## 2022-01-01 DIAGNOSIS — Z02.9 ENCOUNTER FOR ADMINISTRATIVE EXAMINATIONS, UNSPECIFIED: ICD-10-CM

## 2022-01-01 DIAGNOSIS — I48.91 UNSPECIFIED ATRIAL FIBRILLATION: ICD-10-CM

## 2022-01-01 DIAGNOSIS — Z87.891 PERSONAL HISTORY OF NICOTINE DEPENDENCE: ICD-10-CM

## 2022-01-01 DIAGNOSIS — C78.00 SECONDARY MALIGNANT NEOPLASM OF UNSPECIFIED LUNG: ICD-10-CM

## 2022-01-01 DIAGNOSIS — Z98.41 CATARACT EXTRACTION STATUS, RIGHT EYE: Chronic | ICD-10-CM

## 2022-01-01 DIAGNOSIS — E11.649 TYPE 2 DIABETES MELLITUS WITH HYPOGLYCEMIA WITHOUT COMA: ICD-10-CM

## 2022-01-01 DIAGNOSIS — E11.9 TYPE 2 DIABETES MELLITUS W/OUT COMPLICATIONS: ICD-10-CM

## 2022-01-01 DIAGNOSIS — K52.9 NONINFECTIVE GASTROENTERITIS AND COLITIS, UNSPECIFIED: ICD-10-CM

## 2022-01-01 DIAGNOSIS — R65.11 SYSTEMIC INFLAMMATORY RESPONSE SYNDROME (SIRS) OF NON-INFECTIOUS ORIGIN WITH ACUTE ORGAN DYSFUNCTION: ICD-10-CM

## 2022-01-01 DIAGNOSIS — D53.9 NUTRITIONAL ANEMIA, UNSPECIFIED: ICD-10-CM

## 2022-01-01 DIAGNOSIS — R91.8 OTHER NONSPECIFIC ABNORMAL FINDING OF LUNG FIELD: ICD-10-CM
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
## 2022-01-01 DIAGNOSIS — Z79.84 LONG TERM (CURRENT) USE OF ORAL HYPOGLYCEMIC DRUGS: ICD-10-CM
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
## 2022-01-01 DIAGNOSIS — Z79.899 OTHER LONG TERM (CURRENT) DRUG THERAPY: ICD-10-CM

## 2022-01-01 DIAGNOSIS — K52.0 GASTROENTERITIS AND COLITIS DUE TO RADIATION: ICD-10-CM

## 2022-01-01 DIAGNOSIS — G47.33 OBSTRUCTIVE SLEEP APNEA (ADULT) (PEDIATRIC): ICD-10-CM

## 2022-01-01 DIAGNOSIS — N18.6 END STAGE RENAL DISEASE: ICD-10-CM

## 2022-01-01 DIAGNOSIS — I12.0 HYPERTENSIVE CHRONIC KIDNEY DISEASE WITH STAGE 5 CHRONIC KIDNEY DISEASE OR END STAGE RENAL DISEASE: ICD-10-CM

## 2022-01-01 DIAGNOSIS — D64.9 ANEMIA, UNSPECIFIED: ICD-10-CM
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
## 2022-01-01 DIAGNOSIS — E53.8 DEFICIENCY OF OTHER SPECIFIED B GROUP VITAMINS: ICD-10-CM

## 2022-01-01 DIAGNOSIS — Z85.51 PERSONAL HISTORY OF MALIGNANT NEOPLASM OF BLADDER: ICD-10-CM

## 2022-01-01 DIAGNOSIS — D63.1 ANEMIA IN CHRONIC KIDNEY DISEASE: ICD-10-CM

## 2022-01-01 DIAGNOSIS — Z79.4 LONG TERM (CURRENT) USE OF INSULIN: ICD-10-CM

## 2022-01-01 DIAGNOSIS — E11.22 TYPE 2 DIABETES MELLITUS WITH DIABETIC CHRONIC KIDNEY DISEASE: ICD-10-CM

## 2022-01-01 DIAGNOSIS — Z99.2 DEPENDENCE ON RENAL DIALYSIS: ICD-10-CM

## 2022-01-01 DIAGNOSIS — E78.5 HYPERLIPIDEMIA, UNSPECIFIED: ICD-10-CM

## 2022-01-01 DIAGNOSIS — A08.4 VIRAL INTESTINAL INFECTION, UNSPECIFIED: ICD-10-CM

## 2022-01-01 LAB
24R-OH-CALCIDIOL SERPL-MCNC: 27.7 PG/ML
ABO + RH PNL BLD: NORMAL
ALBUMIN MFR SERPL ELPH: 51.4 %
ALBUMIN SERPL ELPH-MCNC: 2.2 G/DL — LOW (ref 3.5–5.2)
ALBUMIN SERPL ELPH-MCNC: 3.4 G/DL
ALBUMIN SERPL ELPH-MCNC: 3.6 G/DL
ALBUMIN SERPL ELPH-MCNC: 3.6 G/DL
ALBUMIN SERPL-MCNC: 3.2 G/DL
ALBUMIN/GLOB SERPL: 1 RATIO
ALP BLD-CCNC: 81 U/L
ALP BLD-CCNC: 86 U/L
ALP BLD-CCNC: 87 U/L
ALP SERPL-CCNC: 177 U/L — HIGH (ref 30–115)
ALPHA1 GLOB MFR SERPL ELPH: 7.1 %
ALPHA1 GLOB SERPL ELPH-MCNC: 0.4 G/DL
ALPHA2 GLOB MFR SERPL ELPH: 10.1 %
ALPHA2 GLOB SERPL ELPH-MCNC: 0.6 G/DL
ALT FLD-CCNC: 125 U/L — HIGH (ref 0–41)
ALT SERPL-CCNC: 17 U/L
ALT SERPL-CCNC: 8 U/L
ALT SERPL-CCNC: 9 U/L
ANION GAP SERPL CALC-SCNC: 15 MMOL/L — HIGH (ref 7–14)
ANION GAP SERPL CALC-SCNC: 16 MMOL/L
ANION GAP SERPL CALC-SCNC: 16 MMOL/L
ANION GAP SERPL CALC-SCNC: 18 MMOL/L
ANISOCYTOSIS BLD QL: SLIGHT — SIGNIFICANT CHANGE UP
APTT BLD: 34.5 SEC — SIGNIFICANT CHANGE UP (ref 27–39.2)
AST SERPL-CCNC: 13 U/L
AST SERPL-CCNC: 13 U/L
AST SERPL-CCNC: 21 U/L
AST SERPL-CCNC: 283 U/L — HIGH (ref 0–41)
B-GLOBULIN MFR SERPL ELPH: 9.5 %
B-GLOBULIN SERPL ELPH-MCNC: 0.6 G/DL
BASOPHILS # BLD AUTO: 0 K/UL — SIGNIFICANT CHANGE UP (ref 0–0.2)
BASOPHILS NFR BLD AUTO: 0 % — SIGNIFICANT CHANGE UP (ref 0–1)
BILIRUB SERPL-MCNC: 0.4 MG/DL — SIGNIFICANT CHANGE UP (ref 0.2–1.2)
BILIRUB SERPL-MCNC: 0.5 MG/DL
BILIRUB SERPL-MCNC: 0.5 MG/DL
BILIRUB SERPL-MCNC: 0.6 MG/DL
BLD GP AB SCN SERPL QL: NORMAL
BLD GP AB SCN SERPL QL: SIGNIFICANT CHANGE UP
BUN SERPL-MCNC: 28 MG/DL — HIGH (ref 10–20)
BUN SERPL-MCNC: 51 MG/DL
BUN SERPL-MCNC: 53 MG/DL
BUN SERPL-MCNC: 55 MG/DL
BURR CELLS BLD QL SMEAR: PRESENT — SIGNIFICANT CHANGE UP
CALCIUM SERPL-MCNC: 8.3 MG/DL — LOW (ref 8.5–10.1)
CALCIUM SERPL-MCNC: 8.6 MG/DL
CALCIUM SERPL-MCNC: 8.8 MG/DL
CALCIUM SERPL-MCNC: 8.8 MG/DL
CALCIUM SERPL-MCNC: 8.9 MG/DL
CHLORIDE SERPL-SCNC: 102 MMOL/L
CHLORIDE SERPL-SCNC: 103 MMOL/L
CHLORIDE SERPL-SCNC: 107 MMOL/L — SIGNIFICANT CHANGE UP (ref 98–110)
CHLORIDE SERPL-SCNC: 99 MMOL/L
CHOLEST SERPL-MCNC: 126 MG/DL
CO2 SERPL-SCNC: 24 MMOL/L
CO2 SERPL-SCNC: 24 MMOL/L
CO2 SERPL-SCNC: 27 MMOL/L
CO2 SERPL-SCNC: 29 MMOL/L — SIGNIFICANT CHANGE UP (ref 17–32)
CREAT SERPL-MCNC: 1 MG/DL — SIGNIFICANT CHANGE UP (ref 0.7–1.5)
CREAT SERPL-MCNC: 4.3 MG/DL
CREAT SERPL-MCNC: 4.4 MG/DL
CREAT SERPL-MCNC: 4.8 MG/DL
CREAT SPEC-SCNC: 52 MG/DL
CULTURE RESULTS: SIGNIFICANT CHANGE UP
CULTURE RESULTS: SIGNIFICANT CHANGE UP
DEPRECATED KAPPA LC FREE/LAMBDA SER: 1.21 RATIO
EGFR: 12 ML/MIN/1.73M2
EGFR: 80 ML/MIN/1.73M2 — SIGNIFICANT CHANGE UP
EOSINOPHIL # BLD AUTO: 0 K/UL — SIGNIFICANT CHANGE UP (ref 0–0.7)
EOSINOPHIL NFR BLD AUTO: 0 % — SIGNIFICANT CHANGE UP (ref 0–8)
ESTIMATED AVERAGE GLUCOSE: 146 MG/DL
GAMMA GLOB FLD ELPH-MCNC: 1.4 G/DL
GAMMA GLOB MFR SERPL ELPH: 21.9 %
GAS PNL BLDA: SIGNIFICANT CHANGE UP
GIANT PLATELETS BLD QL SMEAR: PRESENT — SIGNIFICANT CHANGE UP
GLUCOSE BLDC GLUCOMTR-MCNC: 137 MG/DL — HIGH (ref 70–99)
GLUCOSE SERPL-MCNC: 102 MG/DL
GLUCOSE SERPL-MCNC: 130 MG/DL
GLUCOSE SERPL-MCNC: 134 MG/DL
GLUCOSE SERPL-MCNC: 182 MG/DL — HIGH (ref 70–99)
HBA1C MFR BLD HPLC: 6.7 %
HCT VFR BLD CALC: 20.3 %
HCT VFR BLD CALC: 21.6 % — LOW (ref 42–52)
HCT VFR BLD CALC: 22.3 %
HCT VFR BLD CALC: 23.1 %
HCT VFR BLD CALC: 23.3 %
HCT VFR BLD CALC: 23.5 %
HCT VFR BLD CALC: 24 %
HCT VFR BLD CALC: 24.7 %
HDLC SERPL-MCNC: 55 MG/DL
HGB BLD-MCNC: 6.2 G/DL
HGB BLD-MCNC: 6.9 G/DL
HGB BLD-MCNC: 6.9 G/DL
HGB BLD-MCNC: 7 G/DL
HGB BLD-MCNC: 7 G/DL — LOW (ref 14–18)
HGB BLD-MCNC: 7.1 G/DL
HGB BLD-MCNC: 7.4 G/DL
HGB BLD-MCNC: 7.5 G/DL
HYPOCHROMIA BLD QL: SIGNIFICANT CHANGE UP
IGA SER QL IEP: 300 MG/DL
IGG SER QL IEP: 1554 MG/DL
IGM SER QL IEP: 178 MG/DL
INR BLD: 1.31 RATIO — HIGH (ref 0.65–1.3)
INTERPRETATION SERPL IEP-IMP: NORMAL
KAPPA LC CSF-MCNC: 15.22 MG/DL
KAPPA LC SERPL-MCNC: 18.37 MG/DL
LDLC SERPL CALC-MCNC: 62 MG/DL
LYMPHOCYTES # BLD AUTO: 22.8 % — SIGNIFICANT CHANGE UP (ref 20.5–51.1)
LYMPHOCYTES # BLD AUTO: 4.86 K/UL — HIGH (ref 1.2–3.4)
M PROTEIN MFR SERPL ELPH: NORMAL
M PROTEIN SPEC IFE-MCNC: NORMAL
MACROCYTES BLD QL: SLIGHT — SIGNIFICANT CHANGE UP
MAGNESIUM SERPL-MCNC: 2.3 MG/DL — SIGNIFICANT CHANGE UP (ref 1.8–2.4)
MANUAL SMEAR VERIFICATION: SIGNIFICANT CHANGE UP
MCHC RBC-ENTMCNC: 29.4 G/DL
MCHC RBC-ENTMCNC: 30 G/DL
MCHC RBC-ENTMCNC: 30.3 PG — SIGNIFICANT CHANGE UP (ref 27–31)
MCHC RBC-ENTMCNC: 30.4 G/DL
MCHC RBC-ENTMCNC: 30.5 G/DL
MCHC RBC-ENTMCNC: 30.7 G/DL
MCHC RBC-ENTMCNC: 30.8 G/DL
MCHC RBC-ENTMCNC: 30.9 G/DL
MCHC RBC-ENTMCNC: 32.4 G/DL — SIGNIFICANT CHANGE UP (ref 32–37)
MCHC RBC-ENTMCNC: 33 PG
MCHC RBC-ENTMCNC: 33.6 PG
MCHC RBC-ENTMCNC: 34 PG
MCHC RBC-ENTMCNC: 34 PG
MCHC RBC-ENTMCNC: 34.1 PG
MCHC RBC-ENTMCNC: 34.8 PG
MCHC RBC-ENTMCNC: 35.9 PG
MCV RBC AUTO: 108.8 FL
MCV RBC AUTO: 109.1 FL
MCV RBC AUTO: 109.9 FL
MCV RBC AUTO: 111.5 FL
MCV RBC AUTO: 115.8 FL
MCV RBC AUTO: 115.9 FL
MCV RBC AUTO: 116.7 FL
MCV RBC AUTO: 93.5 FL — SIGNIFICANT CHANGE UP (ref 80–94)
METAMYELOCYTES # FLD: 1.7 % — HIGH (ref 0–0)
METHYLMALONATE SERPL-SCNC: 664 NMOL/L
METHYLMALONATE SERPL-SCNC: 743 NMOL/L
MICROALBUMIN 24H UR DL<=1MG/L-MCNC: 63.5 MG/DL
MICROALBUMIN/CREAT 24H UR-RTO: 1227 MG/G
MICROCYTES BLD QL: SLIGHT — SIGNIFICANT CHANGE UP
MONOCLON BAND OBS SERPL: NORMAL
MONOCYTES # BLD AUTO: 1.13 K/UL — HIGH (ref 0.1–0.6)
MONOCYTES NFR BLD AUTO: 5.3 % — SIGNIFICANT CHANGE UP (ref 1.7–9.3)
MYELOCYTES NFR BLD: 5.3 % — HIGH (ref 0–0)
NEUTROPHILS # BLD AUTO: 13.64 K/UL — HIGH (ref 1.4–6.5)
NEUTROPHILS NFR BLD AUTO: 63.1 % — SIGNIFICANT CHANGE UP (ref 42.2–75.2)
NEUTS BAND # BLD: 0.9 % — SIGNIFICANT CHANGE UP (ref 0–6)
NONHDLC SERPL-MCNC: 71 MG/DL
NRBC # BLD: 5 /100 — HIGH (ref 0–0)
NRBC # BLD: SIGNIFICANT CHANGE UP /100 WBCS (ref 0–0)
OVALOCYTES BLD QL SMEAR: SLIGHT — SIGNIFICANT CHANGE UP
PARATHYROID HORMONE INTACT: 150 PG/ML
PHOSPHATE SERPL-MCNC: 3.9 MG/DL
PLAT MORPH BLD: NORMAL — SIGNIFICANT CHANGE UP
PLATELET # BLD AUTO: 103 K/UL
PLATELET # BLD AUTO: 131 K/UL
PLATELET # BLD AUTO: 135 K/UL
PLATELET # BLD AUTO: 142 K/UL
PLATELET # BLD AUTO: 143 K/UL
PLATELET # BLD AUTO: 150 K/UL
PLATELET # BLD AUTO: 184 K/UL
PLATELET # BLD AUTO: 60 K/UL — LOW (ref 130–400)
PLATELET COUNT - ESTIMATE: ABNORMAL
PMV BLD: 10.2 FL
PMV BLD: 10.7 FL
PMV BLD: 10.8 FL
PMV BLD: 11 FL
PMV BLD: 11 FL
PMV BLD: 11.3 FL
PMV BLD: 11.5 FL
POIKILOCYTOSIS BLD QL AUTO: SLIGHT — SIGNIFICANT CHANGE UP
POLYCHROMASIA BLD QL SMEAR: SLIGHT — SIGNIFICANT CHANGE UP
POTASSIUM SERPL-MCNC: 5.7 MMOL/L — HIGH (ref 3.5–5)
POTASSIUM SERPL-SCNC: 4.1 MMOL/L
POTASSIUM SERPL-SCNC: 4.4 MMOL/L
POTASSIUM SERPL-SCNC: 4.4 MMOL/L
POTASSIUM SERPL-SCNC: 5.7 MMOL/L — HIGH (ref 3.5–5)
PROT SERPL-MCNC: 3.8 G/DL — LOW (ref 6–8)
PROT SERPL-MCNC: 6.3 G/DL
PROT SERPL-MCNC: 6.3 G/DL
PROT SERPL-MCNC: 7 G/DL
PROT SERPL-MCNC: 7 G/DL
PROTHROM AB SERPL-ACNC: 15 SEC — HIGH (ref 9.95–12.87)
RBC # BLD: 1.82 M/UL
RBC # BLD: 1.98 M/UL
RBC # BLD: 2.01 M/UL
RBC # BLD: 2.03 M/UL
RBC # BLD: 2.03 M/UL
RBC # BLD: 2.2 M/UL
RBC # BLD: 2.27 M/UL
RBC # BLD: 2.31 M/UL — LOW (ref 4.7–6.1)
RBC # FLD: 20.5 % — HIGH (ref 11.5–14.5)
RBC # FLD: 22.7 %
RBC # FLD: 23 %
RBC # FLD: 24 %
RBC # FLD: 24 %
RBC # FLD: 25.1 %
RBC # FLD: 26.5 %
RBC # FLD: NORMAL %
RBC BLD AUTO: ABNORMAL
SCHISTOCYTES BLD QL AUTO: SLIGHT — SIGNIFICANT CHANGE UP
SODIUM SERPL-SCNC: 142 MMOL/L
SODIUM SERPL-SCNC: 142 MMOL/L
SODIUM SERPL-SCNC: 145 MMOL/L
SODIUM SERPL-SCNC: 151 MMOL/L — HIGH (ref 135–146)
SPECIMEN SOURCE: SIGNIFICANT CHANGE UP
SPECIMEN SOURCE: SIGNIFICANT CHANGE UP
TRIGL SERPL-MCNC: 61 MG/DL
TROPONIN T SERPL-MCNC: 0.26 NG/ML — CRITICAL HIGH
VARIANT LYMPHS # BLD: 0.9 % — SIGNIFICANT CHANGE UP (ref 0–5)
VIT B12 SERPL-MCNC: 1385 PG/ML
VIT B12 SERPL-MCNC: >2000 PG/ML
WBC # BLD: 21.31 K/UL — HIGH (ref 4.8–10.8)
WBC # FLD AUTO: 12.02 K/UL
WBC # FLD AUTO: 12.37 K/UL
WBC # FLD AUTO: 21.31 K/UL — HIGH (ref 4.8–10.8)
WBC # FLD AUTO: 7.28 K/UL
WBC # FLD AUTO: 7.89 K/UL
WBC # FLD AUTO: 8.55 K/UL
WBC # FLD AUTO: 8.81 K/UL
WBC # FLD AUTO: 8.96 K/UL

## 2022-01-01 PROCEDURE — 99213 OFFICE O/P EST LOW 20 MIN: CPT | Mod: 95

## 2022-01-01 PROCEDURE — 71045 X-RAY EXAM CHEST 1 VIEW: CPT | Mod: 26

## 2022-01-01 PROCEDURE — 99213 OFFICE O/P EST LOW 20 MIN: CPT

## 2022-01-01 PROCEDURE — 93010 ELECTROCARDIOGRAM REPORT: CPT

## 2022-01-01 PROCEDURE — G2012 BRIEF CHECK IN BY MD/QHP: CPT

## 2022-01-01 PROCEDURE — 78815 PET IMAGE W/CT SKULL-THIGH: CPT | Mod: 26,PS,MH

## 2022-01-01 PROCEDURE — 99214 OFFICE O/P EST MOD 30 MIN: CPT

## 2022-01-01 RX ORDER — PREGABALIN 225 MG/1
1000 CAPSULE ORAL ONCE
Refills: 0 | Status: COMPLETED | OUTPATIENT
Start: 2022-01-01 | End: 2022-01-01

## 2022-01-01 RX ORDER — SODIUM CHLORIDE 9 MG/ML
1000 INJECTION, SOLUTION INTRAVENOUS ONCE
Refills: 0 | Status: DISCONTINUED | OUTPATIENT
Start: 2022-01-01 | End: 2022-01-01

## 2022-01-01 RX ORDER — MEROPENEM 1 G/30ML
1000 INJECTION INTRAVENOUS ONCE
Refills: 0 | Status: DISCONTINUED | OUTPATIENT
Start: 2022-01-01 | End: 2022-01-01

## 2022-01-01 RX ORDER — MEROPENEM 1 G/30ML
1000 INJECTION INTRAVENOUS EVERY 8 HOURS
Refills: 0 | Status: DISCONTINUED | OUTPATIENT
Start: 2022-01-01 | End: 2022-01-01

## 2022-01-01 RX ORDER — PANTOPRAZOLE SODIUM 20 MG/1
40 TABLET, DELAYED RELEASE ORAL EVERY 12 HOURS
Refills: 0 | Status: DISCONTINUED | OUTPATIENT
Start: 2022-01-01 | End: 2022-01-01

## 2022-01-01 RX ORDER — PHENYLEPHRINE HYDROCHLORIDE 10 MG/ML
0.1 INJECTION INTRAVENOUS
Qty: 160 | Refills: 0 | Status: DISCONTINUED | OUTPATIENT
Start: 2022-01-01 | End: 2022-01-01

## 2022-01-01 RX ORDER — AMIODARONE HYDROCHLORIDE 400 MG/1
1 TABLET ORAL
Qty: 900 | Refills: 0 | Status: DISCONTINUED | OUTPATIENT
Start: 2022-01-01 | End: 2022-01-01

## 2022-01-01 RX ORDER — VANCOMYCIN HCL 1 G
750 VIAL (EA) INTRAVENOUS EVERY 12 HOURS
Refills: 0 | Status: DISCONTINUED | OUTPATIENT
Start: 2022-01-01 | End: 2022-01-01

## 2022-01-01 RX ORDER — LEVETIRACETAM 250 MG/1
2000 TABLET, FILM COATED ORAL ONCE
Refills: 0 | Status: DISCONTINUED | OUTPATIENT
Start: 2022-01-01 | End: 2022-01-01

## 2022-01-01 RX ORDER — NOREPINEPHRINE BITARTRATE/D5W 8 MG/250ML
0.05 PLASTIC BAG, INJECTION (ML) INTRAVENOUS
Qty: 16 | Refills: 0 | Status: DISCONTINUED | OUTPATIENT
Start: 2022-01-01 | End: 2022-01-01

## 2022-01-01 RX ORDER — AMIODARONE HYDROCHLORIDE 400 MG/1
150 TABLET ORAL ONCE
Refills: 0 | Status: COMPLETED | OUTPATIENT
Start: 2022-01-01 | End: 2022-01-01

## 2022-01-01 RX ORDER — SODIUM CHLORIDE 9 MG/ML
1000 INJECTION, SOLUTION INTRAVENOUS ONCE
Refills: 0 | Status: COMPLETED | OUTPATIENT
Start: 2022-01-01 | End: 2022-01-01

## 2022-01-01 RX ORDER — WARFARIN 4 MG/1
4 TABLET ORAL DAILY
Qty: 45 | Refills: 3 | Status: DISCONTINUED | COMMUNITY
End: 2022-01-01

## 2022-01-01 RX ORDER — AMIODARONE HYDROCHLORIDE 400 MG/1
0.5 TABLET ORAL
Qty: 900 | Refills: 0 | Status: DISCONTINUED | OUTPATIENT
Start: 2022-06-23 | End: 2022-01-01

## 2022-01-01 RX ORDER — SODIUM CHLORIDE 9 MG/ML
1000 INJECTION, SOLUTION INTRAVENOUS
Refills: 0 | Status: DISCONTINUED | OUTPATIENT
Start: 2022-01-01 | End: 2022-01-01

## 2022-01-01 RX ORDER — MORPHINE SULFATE 50 MG/1
4 CAPSULE, EXTENDED RELEASE ORAL ONCE
Refills: 0 | Status: DISCONTINUED | OUTPATIENT
Start: 2022-01-01 | End: 2022-01-01

## 2022-01-01 RX ORDER — SYRINGE W-NEEDLE,DISPOSAB,3 ML 25GX5/8"
22G X 1-1/2" SYRINGE, EMPTY DISPOSABLE MISCELLANEOUS
Qty: 3 | Refills: 3 | Status: ACTIVE | COMMUNITY
Start: 2022-01-01 | End: 1900-01-01

## 2022-01-01 RX ORDER — WARFARIN 4 MG/1
4 TABLET ORAL DAILY
Qty: 90 | Refills: 3 | Status: DISCONTINUED | COMMUNITY
Start: 2021-05-06 | End: 2022-01-01

## 2022-01-01 RX ORDER — CHLORHEXIDINE GLUCONATE 213 G/1000ML
1 SOLUTION TOPICAL
Refills: 0 | Status: DISCONTINUED | OUTPATIENT
Start: 2022-01-01 | End: 2022-01-01

## 2022-01-01 RX ORDER — GLIPIZIDE 5 MG/1
5 TABLET ORAL DAILY
Qty: 30 | Refills: 5 | Status: ACTIVE | COMMUNITY
Start: 2022-01-01 | End: 1900-01-01

## 2022-01-01 RX ORDER — CYANOCOBALAMIN
1000 KIT
Qty: 3 | Refills: 3 | Status: ACTIVE | COMMUNITY
Start: 2022-01-01 | End: 1900-01-01

## 2022-01-01 RX ORDER — VASOPRESSIN 20 [USP'U]/ML
0.02 INJECTION INTRAVENOUS
Qty: 50 | Refills: 0 | Status: DISCONTINUED | OUTPATIENT
Start: 2022-01-01 | End: 2022-01-01

## 2022-01-01 RX ORDER — MORPHINE SULFATE 50 MG/1
2 CAPSULE, EXTENDED RELEASE ORAL
Refills: 0 | Status: DISCONTINUED | OUTPATIENT
Start: 2022-01-01 | End: 2022-01-01

## 2022-01-01 RX ORDER — MIDODRINE HYDROCHLORIDE 2.5 MG/1
15 TABLET ORAL EVERY 8 HOURS
Refills: 0 | Status: DISCONTINUED | OUTPATIENT
Start: 2022-01-01 | End: 2022-01-01

## 2022-01-01 RX ORDER — LEVETIRACETAM 250 MG/1
1000 TABLET, FILM COATED ORAL EVERY 12 HOURS
Refills: 0 | Status: DISCONTINUED | OUTPATIENT
Start: 2022-01-01 | End: 2022-01-01

## 2022-01-01 RX ORDER — HYDROCORTISONE 20 MG
50 TABLET ORAL EVERY 8 HOURS
Refills: 0 | Status: DISCONTINUED | OUTPATIENT
Start: 2022-01-01 | End: 2022-01-01

## 2022-01-01 RX ADMIN — AMIODARONE HYDROCHLORIDE 33.3 MG/MIN: 400 TABLET ORAL at 17:52

## 2022-01-01 RX ADMIN — SODIUM CHLORIDE 1000 MILLILITER(S): 9 INJECTION, SOLUTION INTRAVENOUS at 17:05

## 2022-01-01 RX ADMIN — PREGABALIN 1000 MICROGRAM(S): 225 CAPSULE ORAL at 09:46

## 2022-01-01 RX ADMIN — PREGABALIN 1000 MICROGRAM(S): 225 CAPSULE ORAL at 13:44

## 2022-01-01 RX ADMIN — PREGABALIN 1000 MICROGRAM(S): 225 CAPSULE ORAL at 11:02

## 2022-01-01 RX ADMIN — MORPHINE SULFATE 4 MILLIGRAM(S): 50 CAPSULE, EXTENDED RELEASE ORAL at 20:13

## 2022-01-01 RX ADMIN — AMIODARONE HYDROCHLORIDE 600 MILLIGRAM(S): 400 TABLET ORAL at 17:15

## 2022-01-01 RX ADMIN — PREGABALIN 1000 MICROGRAM(S): 225 CAPSULE ORAL at 10:26

## 2022-01-06 PROBLEM — L51.1 STEVENS-JOHNSON SYNDROME: Chronic | Status: ACTIVE | Noted: 2021-01-01

## 2022-01-06 PROBLEM — I48.91 UNSPECIFIED ATRIAL FIBRILLATION: Chronic | Status: ACTIVE | Noted: 2019-01-16

## 2022-01-06 PROBLEM — N18.9 CHRONIC KIDNEY DISEASE, UNSPECIFIED: Chronic | Status: ACTIVE | Noted: 2019-01-16

## 2022-01-06 PROBLEM — D64.9 ANEMIA, UNSPECIFIED: Chronic | Status: ACTIVE | Noted: 2021-01-28

## 2022-01-10 NOTE — HISTORY OF PRESENT ILLNESS
[Home] : at home, [unfilled] , at the time of the visit. [Medical Office: ([facility])___] : at the medical office located in  [Verbal consent obtained from patient] : the patient, [unfilled] [Disease:__________________________] : Disease: [unfilled] [de-identified] : The patient asked for Telehealth visit instead of the regular one.\par He is presently on hemodialysis and is scheduled for another session later today. \par Since his last visit with us, he had radiation therapy to the bladder at [facility]. He has completed all treatments about 3 weeks ago. Following those treatments, he had no longer experienced hematuria and transfusion requirements have been reduced significantly. Overall, he has been feeling much better especially. Unfortunately, because of his ESRD, his Hgb has not been back to normal. He is not getting any B 12 injections any longer from his nephrologist. My impression is that he is getting erythrocyte stimulating agent (?Epogen?). \par Occasionally he is having a mild diarrhea. No fever or night sweats. No headache or dizziness. Some shortness of breath with exertion.\par The situation was discussed. He was told about my concern for his macrocytosis and the possibility of repeating the bone marrow studies especially that the previous ones were done more then 4 years ago to rule out refractory anemia and other pathologies. Despite the B 12 injections, the macrocytosis actually has gotten gradually worse. His latest B 12 level from 3 days ago had shown a value of 1385.\par His last Hgb was 6.9 with an MCV of 109, WBC 12.37. He had "immature granulocytes" for a while and stable around 4.4 to 6.0 % range. That will be another reason for proceeding with bone marrow studies.\par All his questions answered.\par \par He was told that, ideally, he should be seen and examined in the office every 4-6 months, provided repeat bone marrow studies do not show clear-cut MDS or worse,  and as needed. \par For the moment, he was told that he should be monitored closely. We will continue to hold the B 12.

## 2022-02-09 PROBLEM — E53.8 VITAMIN B12 DEFICIENCY: Status: ACTIVE | Noted: 2022-01-01

## 2022-04-04 PROBLEM — R91.8 PULMONARY NODULES: Status: ACTIVE | Noted: 2022-01-01

## 2022-04-25 NOTE — REASON FOR VISIT
[Follow-Up Visit] : a follow-up [Spouse] : spouse [FreeTextEntry2] : Metastatic bladder cancer, macrocytic anemia (?MDS?)

## 2022-04-25 NOTE — REVIEW OF SYSTEMS
[Fatigue] : fatigue [SOB on Exertion] : shortness of breath during exertion [Diarrhea] : diarrhea [Joint Pain] : joint pain [Insomnia] : insomnia [Easy Bleeding] : a tendency for easy bleeding [Negative] : Neurological [FreeTextEntry2] : He doesn't sleep at night [FreeTextEntry8] : See history above [de-identified] : From, the rectum

## 2022-04-25 NOTE — ASSESSMENT
[FreeTextEntry1] : 1. Bladder carcinoma, S/P RT to the bladder, now metastatic lung disease suspected. Scheduled for lung biopsy soon.\par 2. Macrocytic anemia, probably MDS. The work up was negative including bone marrow studies a few years ago (results were not definitive). His anemia is certainly aggravated also by his kidney disease.\par 3. 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 On antibiotherapy with Augmentin.\par \par The situation was discussed with the patient and the wife.\par At this time, I recommended obtaining a PET scan to see if the pulmonary nodules are possibly metastatic in addition to look for other abnormal areas including the evaluation of the bones.\par Once the pathology is confirmed, the pathology will require systemic therapy. Hopefully, there will be a therapeutic target and chemotherapy may be avoided.\par Will not order any blood work today since they were recently done.\par \par All questions answered.\par \par Further recommendations after the above completed.

## 2022-04-25 NOTE — PHYSICAL EXAM
[Restricted in physically strenuous activity but ambulatory and able to carry out work of a light or sedentary nature] : Status 1- Restricted in physically strenuous activity but ambulatory and able to carry out work of a light or sedentary nature, e.g., light house work, office work [Normal] : affect appropriate [de-identified] : Irregularly irregular rhythm  [de-identified] : 2 + edema bilaterally [de-identified] : Some arthritic changes

## 2022-04-25 NOTE — HISTORY OF PRESENT ILLNESS
[Disease: _____________________] : Disease: [unfilled] [M: ___] : M[unfilled] [AJCC Stage: ____] : AJCC Stage: [unfilled] [de-identified] : The patient is coming for his regularly scheduled follow up for his macrocytic anemia (?MDS) and recently diagnosed metastatic disease from the bladder cancer. \par The patient feels stable but he is frustrated by his daily diarrhea. His urine has turned brownish. Occasionally his bladder may hurt when he tries to urinate but no burning on urination; he has also noted air when he urinates.\par His appetite is stable. \par The main issue seems to be frustration  after all the procedures on the bladder including RT and now diagnosed with lung mets. Scheduled for lung biopsy soon for confirmation of the suspicion.\par He has also some bleeding from the rectum thought to be from hemorrhoids.\par  [de-identified] : Urothelial

## 2022-04-25 NOTE — HISTORY OF PRESENT ILLNESS
[de-identified] : The patient is coming to discuss the results of his recent PET scan.\par That showed progression of his pulmonary lesions. \par We went over the results and at this point the only option would be chemotherapy. Gemcitabine with or without carboplatin described.\par He is due to see his surgeons for his intraabdominal fistula which is giving him severe pain and discomfort in addition to the blood and necrotic material in the urine.\par We went over the risks and benefits of systemic therapy. If the chemotherapy fails, we will still have the option of immunotherapy with atezolizumab.\par All their questions answered.\par \par The patient will keep in touch with us and contact us after the surgery. We will then schedule the chemotherapy accordingly.

## 2022-04-25 NOTE — HISTORY OF PRESENT ILLNESS
[de-identified] : The patient is coming to discuss the results of his recent PET scan.\par That showed progression of his pulmonary lesions. \par We went over the results and at this point the only option would be chemotherapy. Gemcitabine with or without carboplatin described.\par He is due to see his surgeons for his intraabdominal fistula which is giving him severe pain and discomfort in addition to the blood and necrotic material in the urine.\par We went over the risks and benefits of systemic therapy. If the chemotherapy fails, we will still have the option of immunotherapy with atezolizumab.\par All their questions answered.\par \par The patient will keep in touch with us and contact us after the surgery. We will then schedule the chemotherapy accordingly.

## 2022-05-12 NOTE — VITALS
[80: Normal activity with effort; some signs or symptoms of disease.] : 80: Normal activity with effort; some signs or symptoms of disease. 
no

## 2022-06-22 NOTE — CHART NOTE - NSCHARTNOTEFT_GEN_A_CORE
Intravascular Access  - Patient has a Right IJ from 06/13  - He has a Right TDC from 04/30  - He also has a Left Axillary Arterial Line placed on 05/25  - No wiggins catheter as patient is anuric? on HD/CRRT

## 2022-06-22 NOTE — H&P ADULT - HISTORY OF PRESENT ILLNESS
History of Present Illness  Mr. Issa is a 71 year old male patient known to have:  - ESRD. Was on HD. Now on CRRT. Has a right TDC  - CAD s/p no stents  - Paroxysmal Atrial Fibrillation  - Hypertension  -       He presented to the ED on 27/05/2020 for ...  History goes back to ... when the patient started complaining of ...  On review of systems, patient denies any recent fever, chills, night sweats, URTI symptoms (cough, rhinorrhea, sore throat), urinary symptoms (urinary frequency, urgency, intermittence, dysuria, foul smelling urine, cloudy urine), change in bowel movements (diarrhea or constipation), abdominal pain, headache, nausea, or vomiting. No sick contacts. No recent travel or exposure to recent travelers.      Upon presentation to the ED, the patient was hemodynamically stable:  Vital Signs in ED on 03/08/2020  * POCT  * ECG  * Troponin     History of Present Illness  Mr. Issa is a 71 year old male patient (DNR DNI until arrest on arrival at [facility] when it was rescinded) known to have:  - LUPE  - ESRD. Was on HD. Now on CRRT. 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 Last TTE 06/19/22 EF 50-55%, Severe pulmonary       He presented initially to [facility] on 04/28 for partial cystectomy and rectosigmoidectomy due to a known colovesical fistula resulting in hematuria and hematochezia.  He coded on induction s/p intubation then extubation on 04/28 per sign out.  He was admitted to MICU at [facility] where he was transitioned from CPAP to NC.  He is s/p RLQ diverting colostomy on 05/19 s/p admission to SICU at [facility].  His stay was complicated by leukocytosis and sepsis likely secondary to pyelonephritis (negative blood cultures on 05/19 x2, 06/13 x1, and 06/19 x1; sputum cultures growing E coli and proteus on 06/05; MRSA 06/06 negative; procalcitonin 1.26 06/18 and 1/58 on 06/14). He was started on IV Zosyn from 05/09-05/25 then 06/14-06/15. He also received IV Vancomycin from 06/05-06/06. He was most recently getting IV Meropenem 1g Q8h since 06/15.  His stay was also complicated by persistent hematochezia and hematuria requiring a total of 41 units of pRBCs per sign out.  He is s/p cystoscopy and clot evacuation on 06/02.      At [facility], patient was found to be a Hospice candidate but family opted to make him DNR DNI and continue conservative management.  Since they live in [place], they requested to transfer patient to [facility] for as needed transfusions.    On arrival to his ICU room at [facility], patient coded before being settled.  His vitals prior to coding were: /60mmHg, HR 140bpm (not attached to tele).  He had a pool of blood near rectum suggestive of known history of hematochezia.  In the setting of the DNR DNI status of patient on sign out, the HCP Ms Alexander (spouse) was contacted and goals of care were re-addressed.   Decision was made to rescind the DNR DNI order and switch back to Full code so CPR was initiated promptly (Code Blue and Code Fusion called).      ACLS was initiated at around 16:30 PM and lasted for around 14 minutes before ROSC was achieved received a total of 5 epinephrines, 3 bicarbonates doses, and 2 shocks for v fib on monitor post first 2 cycles. He also received 1 unit of pRBC as part of code fusion (2nd unit cancelled after Hb came back 8.1 on ABGs). Patient was intubated abd mechanically ventilated: TV CXR confirmed ET tube placement and ECG was performed revealing ventricular rhythm. ABGs and STAT labs were sent.  Goals of care were rediscussed with wife at bedside and decision was made to keep him full code despite understanding the risk of coding again and his extremely poor prognosis.  Patient went into ventricular tachycardia at around 17:09 PM s/p a 3rd shock -> coded again after the shock at around 17:10 PM. This code lasted for around 5 minutes before ROSC was achieved: s/p 2 epinephrines, 1 calcium, and 1 magnesium doses.  He was loaded with Amiodarone 300mg x1 dose followed by initiation of the drip.  He was started on levophed at a rate of 1 as well.  Current vital signs: /60mmHg,  bpm      Investigations   Laboratory Workup  - CBC: sent pending    - Chemistry: sent pending    - Coagulation Studies:  PT/INR - ( 22 Jun 2022 17:21 )   PT: 15.00 sec;   INR: 1.31 ratio      PTT - ( 22 Jun 2022 17:21 )  PTT:34.5 sec  - ABG:  ABG - ( 22 Jun 2022 17:11 )  pH, Arterial: 7.30  pH, Blood: x     /  pCO2: 51    /  pO2: 407   / HCO3: 25    / Base Excess: -1.4  /  SaO2: 99.6        Patient will be monitored in MICU.  Daughter is on her way at this time. History of Present Illness  Mr. Issa is a 71 year old male patient (DNR DNI until arrest on arrival at [facility] when it was rescinded) known to have:  - LUPE  - ESRD. Was on HD. Now on CRRT. 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 Last TTE 06/19/22 EF 50-55%, Severe pulmonary       He presented initially to [facility] on 04/28 for partial cystectomy and rectosigmoidectomy due to a known colovesical fistula resulting in hematuria and hematochezia.  He coded on induction s/p intubation then extubation on 04/28 per sign out.  He was admitted to MICU at [facility] where he was transitioned from CPAP to NC.  He is s/p RLQ diverting colostomy on 05/19 s/p admission to SICU at [facility].  His stay was complicated by leukocytosis and sepsis likely secondary to pyelonephritis (negative blood cultures on 05/19 x2, 06/13 x1, and 06/19 x1; sputum cultures growing E coli and proteus on 06/05; MRSA 06/06 negative; procalcitonin 1.26 06/18 and 1/58 on 06/14). He was started on IV Zosyn from 05/09-05/25 then 06/14-06/15. He also received IV Vancomycin from 06/05-06/06. He was most recently getting IV Meropenem 1g Q8h since 06/15.  His stay was also complicated by persistent hematochezia and hematuria requiring a total of 41 units of pRBCs per sign out.  He is s/p cystoscopy and clot evacuation on 06/02.      At [facility], patient was found to be a Hospice candidate but family opted to make him DNR DNI and continue conservative management.  Since they live in [place], they requested to transfer patient to [facility] for as needed transfusions.    On arrival to his ICU room at [facility], patient coded before being settled.  His vitals prior to coding were: /60mmHg, HR 140bpm (not attached to tele).  He had a pool of blood near rectum suggestive of known history of hematochezia.  In the setting of the DNR DNI status of patient on sign out and absence of official documents, the HCP Ms Alexander (spouse) was immediately contacted and goals of care were re-addressed.   Decision was made to rescind the DNR DNI order and switch back to Full code so CPR was initiated promptly (Code Blue and Code Fusion called).      ACLS was initiated at around 16:30 PM and lasted for around 14 minutes before ROSC was achieved received a total of 5 epinephrines, 3 bicarbonates doses, and 2 shocks for v fib on monitor post first 2 cycles. He also received 1 unit of pRBC as part of code fusion (2nd unit cancelled after Hb came back 8.1 on ABGs). Patient was intubated abd mechanically ventilated: TV CXR confirmed ET tube placement and ECG was performed revealing ventricular rhythm. ABGs and STAT labs were sent.  Goals of care were rediscussed with wife at bedside and decision was made to keep him full code despite understanding the risk of coding again and his extremely poor prognosis.  Patient went into ventricular tachycardia at around 17:09 PM s/p a 3rd shock -> coded again after the shock at around 17:10 PM. This code lasted for around 5 minutes before ROSC was achieved: s/p 2 epinephrines, 1 calcium, and 1 magnesium doses.  He was loaded with Amiodarone 300mg x1 dose followed by initiation of the drip.  He was started on levophed at a rate of 1 as well.  Current vital signs: /60mmHg,  bpm      Investigations   Laboratory Workup  - CBC: sent pending    - Chemistry: sent pending    - Coagulation Studies:  PT/INR - ( 22 Jun 2022 17:21 )   PT: 15.00 sec;   INR: 1.31 ratio      PTT - ( 22 Jun 2022 17:21 )  PTT:34.5 sec  - ABG:  ABG - ( 22 Jun 2022 17:11 )  pH, Arterial: 7.30  pH, Blood: x     /  pCO2: 51    /  pO2: 407   / HCO3: 25    / Base Excess: -1.4  /  SaO2: 99.6        Patient will be monitored in MICU.  Daughter is on her way at this time.

## 2022-06-22 NOTE — DISCHARGE NOTE FOR THE EXPIRED PATIENT - HOSPITAL COURSE
On arrival to ICU, patient coded prior to settling into room. Patient was DNR/DNI, but following discussion with patient's HCP (spouse), DNR/DNI was rescinded, patient was switched to Full Code, and CPR was promptly initiated. ACLS was started at approximately 16:30, and was continued for 14 minutes until ROSC. Patient required a total of 5 epinephrines, 3 bicarbs, 2 shocks for v-fib on monitor, and 1-unit of packed red blood cells. Patient was subsequently intubated and mechanically ventilated. Goals of care were discussed with wife at this point, and decision was made to keep patient full code. Patient went into ventricular tachycardia at approximately 17:09 and was administered a 3rd shock prior to coding again at approximately 17:10. The code lasted for five minuted prior to ROSC.     A subsequent discussion was had with the family regarding patient's status, and which point it was decided to make the patient DNR. Comfort measures were put in place. Patient  at 20:38 On arrival to ICU, patient coded prior to settling into room. Patient was DNR/DNI, but following discussion with patient's HCP (spouse), DNR/DNI was rescinded, patient was switched to Full Code, and CPR was promptly initiated. ACLS was started at approximately 16:30, and was continued for 14 minutes until ROSC. Patient required a total of 5 epinephrines, 3 bicarbs, 2 shocks for v-fib on monitor, and 1-unit of packed red blood cells. Patient was subsequently intubated and mechanically ventilated. Goals of care were discussed with wife at this point, and decision was made to keep patient full code. Patient went into ventricular tachycardia at approximately 17:09 and was administered a 3rd shock prior to coding again at approximately 17:10. The code lasted for five minuted prior to ROSC.     A subsequent discussion was had with the family regarding patient's status, and which point it was decided to make the patient DNR. Comfort measures were put in place. Patient  at 20:38 with family at bedside.

## 2022-06-22 NOTE — H&P ADULT - NSHPPHYSICALEXAM_GEN_ALL_CORE
- Physical Exam in ED  * General Appearance: intubated, mechanically ventilated, sedated  * Eyes: dilated pupils, sluggish reactivity  * Lungs: intubated, Good bilateral air entry, audible crackles, no audible rhonchi or wheezes  * Heart: Irregular Rate and Rhythm  * Abdomen: Symmetric, non-distended, no scar, soft, non-tender, bowel sounds active all four quadrants, no masses, no organomegaly (no hepatosplenomegaly)  * Extremities: Extremities normal, atraumatic, no cyanosis, no lower extremity pitting edema bilaterally, adequate dorsalis pedis pulses  * Skin: stage II pressure ulcer  * Neurologic: intubated, mechanically ventilated, sedated

## 2022-06-22 NOTE — CHART NOTE - NSCHARTNOTEFT_GEN_A_CORE
Called by daughter at bedside with wife (HCP) present. They do not want the patient to suffer and want him to pass away in peace. They want him to be comfortable, per the daughter the patient never wanted to be intubated. Wife would like to withdraw all care and extubate the patient. Explained that pressors will be titrated off and patient would likely pass away very soon. Family is aware and at bedside.

## 2022-06-22 NOTE — GOALS OF CARE CONVERSATION - ADVANCED CARE PLANNING - CONVERSATION DETAILS
Ms Alexander (HCP wife) discussed the case with her daughter at bedside and decision was made to make patient DNR.  Medical team respects family's decision.  MOLST form was completed.
Goals of care were initially discussed with Ms. Alexander (Spouse/ HCP) over the phone right after the patient arrived to [facility] and codes. Prior DNR DNI status was rescinded and patient was intubated after ROSC was achieved.      Goals of care were readdressed after the second code and on arrival of wife and daughter at bedside.  They were made aware of patient's poor prognosis as he has coded twice today and another time on 04/28.  They were also made aware of the high likelihood of coding again at anytime during his stay.  Difference between being DNR/DNI and Full Code was addressed.  Decision was made to proceed with full code for now (patient to be resuscitated and intubated, if needed).  The daughter requested that a rEEG be performed to check brain wave activity.  Based on rEEG results, daughter and HCP wife would consider DNR versus comfort measures.  EEG technician was contacted STAT and he is on way to perform rEEG.

## 2022-06-22 NOTE — H&P ADULT - ASSESSMENT
IMPRESSION:  Cardiac Arrest Likely Secondary to Ventricular Fibrillation in Setting of Ischemic Cardiomyopathy  Hematochezia and Hematuria with Recent Acute on Chronic Anemia in Setting of Colovesical Fistula s/p 41 units of pRBCs  Ventricular Fibrillation in setting of History of Paroxysmal Atrial Fibrillation  Sepsis Suspected to be Secondary to Pyelonephritis (actively receiving pressors and Abs at [place])  History of LUPE  History of ESRD. Was on HD. Now on CRRT. Has a right TDC  History of CAD s/p no stents  History of DM Type II  History of Ischemic Cardiomyopathy. 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 TTE EF 50-55%, Severe pulmonary HTN      PLAN:    CNS: Currently off sedation     HEENT: Oral Care    PULMONARY: Continue mechanical ventilation at , RR 18, PEEP 8, FiO2 100%. Daily chest X ray; Daily ABGs; f/u d-dimer, procalcitonin, and ferritin; f/u DTA    CARDIOVASCULAR: Perform bedside echocardiography, Trend troponin; Start IVF with LR at 100mL/hour; Strict I/Os; Keep euvolemic    GI: GI prophylaxis; NPO    RENAL: Nephrology consult for CRRT resumption via right TDC; Trend BUN, Cr and lytes, replete as necessary; Start IVF LR at 100mL/hour    INFECTIOUS DISEASE: Resume IV Meropenem 1g Q8h and start Vancomycin; Daily chest X ray; f/u d-dimer, procalcitonin, ferritin; f/u blood cultures x2; Get DTA; Tylenol for fever    HEMATOLOGICAL: Trend Hb s/p 1 unit pRBC; Active Type and Screen; off DVT prophylaxis; [facility] duplex venous LE negative on 06/07 at [facility]; Check DD    ENDOCRINE: Check TSH, cortisol, and Hba1c, Follow up FS; Start Sliding scale if needed    MUSCULOSKELETAL: bedresr       Full code  Updated spouse (HCP) and daughter  Poor prognosis overall   IMPRESSION:  Cardiac Arrest Likely Secondary to Ventricular Fibrillation in Setting of Ischemic Cardiomyopathy  Hematochezia and Hematuria with Recent Acute on Chronic Anemia in Setting of Colovesical Fistula s/p 41 units of pRBCs  Ventricular Fibrillation in setting of History of Paroxysmal Atrial Fibrillation  Sepsis Suspected to be Secondary to Pyelonephritis (actively receiving pressors and Abs at [place])  History of LUPE  History of ESRD. Was on HD. Now on CRRT. Has a right TDC  History of CAD s/p no stents  History of DM Type II  History of Ischemic Cardiomyopathy. 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 TTE EF 50-55%, Severe pulmonary HTN      PLAN:    CNS: Currently off sedation; will perform rEEG    HEENT: Oral Care    PULMONARY: Continue mechanical ventilation at , RR 18, PEEP 8, FiO2 100%. Daily chest X ray; Daily ABGs; f/u d-dimer, procalcitonin, and ferritin; f/u DTA    CARDIOVASCULAR: Cardiology team consulted in setting of 2 cardiac arrests and ventricular fibrillation s/p a total of 3 shocks; Continue IV Amiodarone drip s/p 300mg x1 dose; Off AC in setting of active bleeding; Perform bedside echocardiography, Trend troponin; Start IVF with LR at 100mL/hour; Strict I/Os; Keep euvolemic    GI: GI team consulted;  Trend Hb (CBC pending but Hb 8 on VBG) s/p 1 unit pRBC; Active Type and Screen; IV protonix BID and not drip as source of bleed is likely the bladder mass and fistula rather than UGIB; Keep patient NPO for now; Holding AC and antiplatelets (have been held at [place] too)    RENAL: Nephrology consult for CRRT resumption via right TDC; Repeat electrolytes at 20:00 PM and Trend BUN, Cr and lytes, replete as necessary; Trend LA    INFECTIOUS DISEASE: Infectious Disease team consulted; Resume IV Meropenem 1g Q8h and start Vancomycin; Daily chest X ray; f/u d-dimer, procalcitonin, ferritin; f/u blood cultures x2; Trend LA; Get DTA; Tylenol for fever    HEMATOLOGICAL: Trend Hb (CBC pending but Hb 8 on VBG) s/p 1 unit pRBC; Active Type and Screen; off DVT prophylaxis; VA duplex venous LE negative on 06/07 at [facility]; Check DD    ENDOCRINE: Check TSH, cortisol, and Hba1c, Follow up FS; Start Sliding scale if needed    MUSCULOSKELETAL: bedresr       Full code  Updated spouse (HCP) and daughter  Poor prognosis overall   IMPRESSION:  Cardiac Arrest Likely Secondary to Ventricular Fibrillation in Setting of Ischemic Cardiomyopathy  Hematochezia and Hematuria with Recent Acute on Chronic Anemia in Setting of Colovesical Fistula s/p 41 units of pRBCs  Ventricular Fibrillation in setting of History of Paroxysmal Atrial Fibrillation and Ischemic CM  Sepsis Suspected to be Secondary to Pyelonephritis (actively receiving pressors and Abs at [place])  History of LUPE  History of ESRD. Was on HD. Now on CRRT. Has a right TDC  History of CAD s/p no stents  History of DM Type II  History of Ischemic Cardiomyopathy. 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 TTE EF 50-55%, Severe pulmonary HTN      PLAN:    CNS: Currently off sedation; will perform rEEG    HEENT: Oral Care    PULMONARY: Continue mechanical ventilation at , RR 18, PEEP 8, FiO2 100%. Daily chest X ray; Daily ABGs; f/u d-dimer, procalcitonin, and ferritin; f/u DTA    CARDIOVASCULAR: Cardiology team consulted in setting of 2 cardiac arrests and ventricular fibrillation s/p a total of 3 shocks; Continue IV Amiodarone drip s/p 300mg x1 dose; Off AC in setting of active bleeding; Perform bedside echocardiography, Trend troponin; Start IVF with LR at 100mL/hour; Strict I/Os; Keep euvolemic    GI: GI team consulted;  Trend Hb (CBC pending but Hb 8 on VBG) s/p 1 unit pRBC; Active Type and Screen; IV protonix BID and not drip as source of bleed is likely the bladder mass and fistula rather than UGIB; Keep patient NPO for now; Holding AC and antiplatelets (have been held at [place] too)    RENAL: Nephrology consult for CRRT resumption via right TDC; Repeat electrolytes at 20:00 PM and Trend BUN, Cr and lytes, replete as necessary; Trend LA    INFECTIOUS DISEASE: Infectious Disease team consulted; Resume IV Meropenem 1g Q8h and start Vancomycin; Daily chest X ray; f/u d-dimer, procalcitonin, ferritin; f/u blood cultures x2; Trend LA; Get DTA; Tylenol for fever    HEMATOLOGICAL: Trend Hb (CBC pending but Hb 8 on VBG) s/p 1 unit pRBC; Active Type and Screen; off DVT prophylaxis; [facility] duplex venous LE negative on 06/07 at [facility]; Check DD    ENDOCRINE: Check TSH, cortisol, and Hba1c, Follow up FS; Start Sliding scale if needed    MUSCULOSKELETAL: bedresr       Full code  Updated spouse (HCP) and daughter  Poor prognosis overall

## 2022-07-05 DIAGNOSIS — E11.22 TYPE 2 DIABETES MELLITUS WITH DIABETIC CHRONIC KIDNEY DISEASE: ICD-10-CM

## 2022-07-05 DIAGNOSIS — I25.10 ATHEROSCLEROTIC HEART DISEASE OF NATIVE CORONARY ARTERY WITHOUT ANGINA PECTORIS: ICD-10-CM

## 2022-07-05 DIAGNOSIS — N32.1 VESICOINTESTINAL FISTULA: ICD-10-CM

## 2022-07-05 DIAGNOSIS — I48.0 PAROXYSMAL ATRIAL FIBRILLATION: ICD-10-CM

## 2022-07-05 DIAGNOSIS — Z79.4 LONG TERM (CURRENT) USE OF INSULIN: ICD-10-CM

## 2022-07-05 DIAGNOSIS — Z95.5 PRESENCE OF CORONARY ANGIOPLASTY IMPLANT AND GRAFT: ICD-10-CM

## 2022-07-05 DIAGNOSIS — N12 TUBULO-INTERSTITIAL NEPHRITIS, NOT SPECIFIED AS ACUTE OR CHRONIC: ICD-10-CM

## 2022-07-05 DIAGNOSIS — I12.0 HYPERTENSIVE CHRONIC KIDNEY DISEASE WITH STAGE 5 CHRONIC KIDNEY DISEASE OR END STAGE RENAL DISEASE: ICD-10-CM

## 2022-07-05 DIAGNOSIS — A41.9 SEPSIS, UNSPECIFIED ORGANISM: ICD-10-CM

## 2022-07-05 DIAGNOSIS — Z99.2 DEPENDENCE ON RENAL DIALYSIS: ICD-10-CM

## 2022-07-05 DIAGNOSIS — C78.02 SECONDARY MALIGNANT NEOPLASM OF LEFT LUNG: ICD-10-CM

## 2022-07-05 DIAGNOSIS — Z66 DO NOT RESUSCITATE: ICD-10-CM

## 2022-07-05 DIAGNOSIS — N18.6 END STAGE RENAL DISEASE: ICD-10-CM

## 2022-07-05 DIAGNOSIS — K92.1 MELENA: ICD-10-CM

## 2022-07-05 DIAGNOSIS — Z51.5 ENCOUNTER FOR PALLIATIVE CARE: ICD-10-CM

## 2022-07-05 DIAGNOSIS — I46.8 CARDIAC ARREST DUE TO OTHER UNDERLYING CONDITION: ICD-10-CM

## 2022-07-05 DIAGNOSIS — R31.9 HEMATURIA, UNSPECIFIED: ICD-10-CM

## 2022-07-05 DIAGNOSIS — I27.20 PULMONARY HYPERTENSION, UNSPECIFIED: ICD-10-CM

## 2022-07-05 DIAGNOSIS — L89.152 PRESSURE ULCER OF SACRAL REGION, STAGE 2: ICD-10-CM

## 2022-07-05 DIAGNOSIS — D62 ACUTE POSTHEMORRHAGIC ANEMIA: ICD-10-CM

## 2022-07-05 DIAGNOSIS — C78.01 SECONDARY MALIGNANT NEOPLASM OF RIGHT LUNG: ICD-10-CM

## 2022-07-05 DIAGNOSIS — G47.33 OBSTRUCTIVE SLEEP APNEA (ADULT) (PEDIATRIC): ICD-10-CM

## 2022-07-05 DIAGNOSIS — Z85.51 PERSONAL HISTORY OF MALIGNANT NEOPLASM OF BLADDER: ICD-10-CM

## 2022-07-05 DIAGNOSIS — I25.5 ISCHEMIC CARDIOMYOPATHY: ICD-10-CM

## 2022-07-05 DIAGNOSIS — I49.01 VENTRICULAR FIBRILLATION: ICD-10-CM

## 2022-07-14 ENCOUNTER — APPOINTMENT (OUTPATIENT)
Dept: ENDOCRINOLOGY | Facility: CLINIC | Age: 72
End: 2022-07-14

## 2022-08-18 NOTE — ED ADULT NURSE NOTE - NURSING MUSC STRENGTH
H&P reviewed. The patient was examined and there are no changes to the H&P.         hand grasp, leg strength strong and equal bilaterally

## 2022-09-07 NOTE — AIRWAY PLACEMENT NOTE ADULT - INDICATIONS:
[facility] send a med refill request for Arielle DELGADILLO-12/6/22
Route for mechanical ventilation/CODE

## 2023-06-26 NOTE — ASU PATIENT PROFILE, ADULT - PRO INTERPRETER NEED 2
Bed in low position, brakes on/Call light is within reach, educate patient/family on its functionality/Assess for adequate lighting, leave nightlight on/Patient and family education available to parents and patient
English

## 2023-10-16 NOTE — CONSULT NOTE ADULT - SUBJECTIVE AND OBJECTIVE BOX
MESHA ZALDIVAR  71y, Male  Allergy: allopurinol (Hobbs-Russ)  Pravachol (Muscle Pain)      CHIEF COMPLAINT: Colitis (28 Dec 2021 17:09)      HPI:  Pt is a 70 yo M PMH ESRD on HD (Mon, Wed, Sat), Bladder Ca s/p chemo/radiation (Finished tx last week), Afib (no longer on AC), HLD, HTN, DM BIBEMS for hypoglycemia (41), gout, LUPE, detached retina. Pt's wife reports patient was confused therefore she called 911. Fingerstick was 41 and was given oral glucose. Pt reports he takes Lantus only if needed. Sugar was 240s last night and he took 8 units, however sometimes he doesn't take any lantus.     Pt also reports 2 week hx of abdominal pain and diarrhea. Was prescribed unknown medications by radiation oncologist with minimal relief. CT Abdomen done in the ER- significant for colitis.     Pt also reports he was due for dialysis yesterday however didn't go because he was too weak and because he had diarrhea     Denies fever/chills (although wife noticed diaphoresis), melena/hematochezia, CP, SOB, vomiting.      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    Former smoker    Hypercholesterolemia    S/P rotator cuff repair  left    Cataract extraction status of eye, right  left eye cataract    History of surgery  TURBT    H/O detached retina repair  b/l eyes    H/O cystoscopy    History of colonoscopy  2016        SOCIAL HISTORY  Social History:  Tobacco use: Quit >30 years ago  EtOH use: Denies  Illicit drug use: Denies  Marital Status: Lives with wife (28 Dec 2021 17:09)        ROS  General: Denies fevers, chills, nightsweats, weight loss  HEENT: Denies headache, rhinorrhea, sore throat, eye pain  CV: Denies CP, palpitations  PULM: Denies SOB, cough  : Denies dysuria, hematuria  MSK: Denies arthralgias  SKIN: Denies rash   NEURO: Denies paresthesias  PSYCH: Denies depression    VITALS:  T(F): 96.7, Max: 96.7 (12-28-21 @ 20:33)  HR: 81  BP: 131/57  RR: 16Vital Signs Last 24 Hrs  T(C): 35.9 (28 Dec 2021 20:33), Max: 35.9 (28 Dec 2021 12:13)  T(F): 96.7 (28 Dec 2021 20:33), Max: 96.7 (28 Dec 2021 20:33)  HR: 81 (28 Dec 2021 17:35) (74 - 83)  BP: 131/57 (28 Dec 2021 20:33) (120/58 - 150/68)  BP(mean): --  RR: 16 (28 Dec 2021 20:33) (16 - 20)  SpO2: 98% (28 Dec 2021 17:06) (98% - 98%)    PHYSICAL EXAM:  Gen: NAD, resting in bed  HEENT: Normocephalic, atraumatic  Neck: supple, no lymphadenopathy  CV: Regular rate & regular rhythm  Lungs: decreased BS at bases, no fremitus  Abdomen: Soft, BS present  Ext: Warm, well perfused  Neuro: non focal, awake  Skin: no rash, no erythema    TESTS & MEASUREMENTS:                        7.8    19.27 )-----------( 185      ( 28 Dec 2021 12:45 )             25.7     12-28    141  |  102  |  79<HH>  ----------------------------<  85  4.8   |  20  |  8.0<HH>    Ca    8.2<L>      28 Dec 2021 12:45    TPro  6.0  /  Alb  2.8<L>  /  TBili  0.6  /  DBili  x   /  AST  26  /  ALT  16  /  AlkPhos  192<H>  12-28    eGFR if Non African American: 6 mL/min/1.73M2 (12-28-21 @ 12:45)  eGFR if : 7 mL/min/1.73M2 (12-28-21 @ 12:45)    LIVER FUNCTIONS - ( 28 Dec 2021 12:45 )  Alb: 2.8 g/dL / Pro: 6.0 g/dL / ALK PHOS: 192 U/L / ALT: 16 U/L / AST: 26 U/L / GGT: x                 Lactate, Blood: 1.9 mmol/L (12-28-21 @ 12:45)  Blood Gas Venous - Lactate: 1.50 mmol/L (12-28-21 @ 12:34)      INFECTIOUS DISEASES TESTING      RADIOLOGY & ADDITIONAL TESTS:  I have personally reviewed the last Chest xray  CXR      CT  CT Abdomen and Pelvis No Cont:   ACC: [id number] EXAM:  CT ABDOMEN AND PELVIS                          PROCEDURE DATE:  12/28/2021          INTERPRETATION:  CLINICAL STATEMENT: Abdominal pain. History of bladder   cancer.    TECHNIQUE: Contiguous axial CT images were obtained from the lower chest   to the pubic symphysis without intravenous contrast.  Oral contrast was   not administered.  Reformatted images in the coronal and sagittal planes   were acquired.    COMPARISON CT: 1/5/2021    OTHER STUDIES USED FOR CORRELATION: PET/CT on 9/23/2021      FINDINGS: Limited evaluation due to the lack of intravenous contrast.    LOWER CHEST: Small right pleural effusion, decreased since 9/23/2021.   Bibasilar subsegmental atelectasis.    HEPATOBILIARY: Unremarkable.    SPLEEN: Multifocal splenic hypodensities are noted which were not FDG   avid on prior PET/CT.    PANCREAS: Unremarkable.    ADRENAL GLANDS: Unremarkable.    KIDNEYS: No stones or hydronephrosis. Left upper pole exophytic simple   renal cyst. Bilateral distal renal artery branch calcifications.    ABDOMINOPELVIC NODES: Unremarkable.    PELVIC ORGANS: Mild left posterior lateral bladder wall thickening at the   site of the prior polypoid bladder mass.    PERITONEUM/MESENTERY/BOWEL: Edema and wall thickening extending from the   splenic flexure down to the sigmoid colon suspicious for colitis.   Moderate to large stool burden in the right hemicolon. No free fluid or   free air. No intra-abdominal collection.    BONES/SOFT TISSUES: Osteopenia. No aggressive bone lesions. Multilevel   degenerative changes of the spine and hip joints.        IMPRESSION:    Edema and wall thickening extending from the splenic flexure down to the   sigmoid colon suspicious for colitis.    Mild left posterior lateral bladder wall thickening at the site of the   prior polypoid bladder mass.    Moderate to large stool burden in the right hemicolon.    Small right pleural effusion, decreased since 9/23/2021    --- End of Report ---            FISH DIXON MD; Attending Radiologist  This document has been electronically signed. Dec 28 2021  3:28PM (12-28-21 @ 15:10)      CARDIOLOGY TESTING  12 Lead ECG:   Ventricular Rate 73 BPM    Atrial Rate 73 BPM    P-R Interval 216 ms    QRS Duration 74 ms    Q-T Interval 364 ms    QTC Calculation(Bazett) 401 ms    P Axis 66 degrees    R Axis 97 degrees    T Axis 128 degrees    Diagnosis Line Sinus rhythm with 1st degree A-V block with occasional Premature ventricular  complexes and Premature atrial complexes  Rightward axis  Septal infarct , age undetermined  Abnormal ECG    Confirmed by TYSON EDWARD MD ([id number]) on 12/28/2021 6:15:15 PM (12-28-21 @ 12:38)      MEDICATIONS  buMETAnide 1  cefepime   IVPB 500  dextrose 40% Gel 15  dextrose 5%. 1000  dextrose 5%. 1000  dextrose 50% Injectable 25  dextrose 50% Injectable 12.5  dextrose 50% Injectable 25  glucagon  Injectable 1  heparin   Injectable 5000  insulin lispro (ADMELOG) corrective regimen sliding scale   metoprolol succinate ER 50  metroNIDAZOLE  IVPB 500  metroNIDAZOLE  IVPB       ANTIBIOTICS:  cefepime   IVPB 500 milliGRAM(s) IV Intermittent every 24 hours  metroNIDAZOLE  IVPB 500 milliGRAM(s) IV Intermittent every 8 hours  metroNIDAZOLE  IVPB          All available historical data has been reviewed     No risk alerts present

## 2024-01-04 NOTE — H&P PST ADULT - PRO TOBACCO TYPE
Abdomen , soft, nontender, nondistended , no guarding or rigidity , no masses palpable , normal bowel sounds , Liver and Spleen,  no hepatosplenomegaly , liver nontender quit 30 yrs ago, 1-2 ppd 20 yrs

## 2025-02-26 NOTE — PATIENT PROFILE ADULT - PUBLIC BENEFITS
When Should The Patient Follow-Up For Their Next Full-Body Skin Exam?: 6 Months Quality 137: Melanoma: Continuity Of Care - Recall System: Patient information entered into a recall system that includes: target date for the next exam specified AND a process to follow up with patients regarding missed or unscheduled appointments Detail Level: Detailed Detail Level: Zone no Detail Level: Generalized

## 2025-03-30 NOTE — ED ADULT TRIAGE NOTE - MEANS OF ARRIVAL
stretcher
Follow up with either sports medicine or Ortho foot and ankle  Continue applying ice daily  Take Ibuprofen 400-600 mg every 8 hours  Elevate foot  Weight bearing as tolerated    Sprain    A sprain is a stretch or tear in one of the tough, fiber-like tissues (ligaments) in your body. This is caused by an injury to the area such as a twisting mechanism. Symptoms include pain, swelling, or bruising. Rest that area over the next several days and slowly resume activity when tolerated. Ice can help with swelling and pain.     SEEK IMMEDIATE MEDICAL CARE IF YOU HAVE ANY OF THE FOLLOWING SYMPTOMS: worsening pain, inability to move that body part, numbness or tingling.